# Patient Record
Sex: FEMALE | Race: WHITE | NOT HISPANIC OR LATINO | Employment: OTHER | URBAN - METROPOLITAN AREA
[De-identification: names, ages, dates, MRNs, and addresses within clinical notes are randomized per-mention and may not be internally consistent; named-entity substitution may affect disease eponyms.]

---

## 2017-01-03 ENCOUNTER — APPOINTMENT (OUTPATIENT)
Dept: RADIOLOGY | Facility: HOSPITAL | Age: 82
End: 2017-01-03
Attending: UROLOGY
Payer: MEDICARE

## 2017-01-03 ENCOUNTER — ANESTHESIA (OUTPATIENT)
Dept: PERIOP | Facility: HOSPITAL | Age: 82
End: 2017-01-03
Payer: MEDICARE

## 2017-01-03 ENCOUNTER — ANESTHESIA EVENT (OUTPATIENT)
Dept: PERIOP | Facility: HOSPITAL | Age: 82
End: 2017-01-03
Payer: MEDICARE

## 2017-01-03 ENCOUNTER — HOSPITAL ENCOUNTER (OUTPATIENT)
Facility: HOSPITAL | Age: 82
Setting detail: OUTPATIENT SURGERY
Discharge: HOME | End: 2017-01-03
Attending: UROLOGY | Admitting: UROLOGY
Payer: MEDICARE

## 2017-01-03 VITALS
WEIGHT: 170 LBS | RESPIRATION RATE: 18 BRPM | HEART RATE: 89 BPM | HEIGHT: 60 IN | BODY MASS INDEX: 33.38 KG/M2 | OXYGEN SATURATION: 100 % | DIASTOLIC BLOOD PRESSURE: 67 MMHG | SYSTOLIC BLOOD PRESSURE: 151 MMHG | TEMPERATURE: 97.8 F

## 2017-01-03 PROCEDURE — 74000 HB X-RAY EXAM OF ABDOMEN (SINGLE ANTEROPOSTERIOR VIEW): CPT

## 2017-01-03 RX ORDER — LIDOCAINE HYDROCHLORIDE 10 MG/ML
INJECTION, SOLUTION INFILTRATION; PERINEURAL AS NEEDED
Status: DISCONTINUED | OUTPATIENT
Start: 2017-01-03 | End: 2017-01-03 | Stop reason: SURG

## 2017-01-03 RX ORDER — SODIUM CHLORIDE, SODIUM LACTATE, POTASSIUM CHLORIDE, CALCIUM CHLORIDE 600; 310; 30; 20 MG/100ML; MG/100ML; MG/100ML; MG/100ML
100 INJECTION, SOLUTION INTRAVENOUS CONTINUOUS
Status: DISCONTINUED | OUTPATIENT
Start: 2017-01-03 | End: 2017-01-03

## 2017-01-03 RX ORDER — OXYCODONE HYDROCHLORIDE AND ACETAMINOPHEN 5; 325 MG/1; MG/1
1 TABLET ORAL EVERY 4 HOURS PRN
Qty: 12 TABLET | Refills: 0 | Status: SHIPPED | OUTPATIENT
Start: 2017-01-03 | End: 2017-01-06

## 2017-01-03 RX ORDER — EPHEDRINE SULFATE 50 MG/ML
INJECTION, SOLUTION INTRAVENOUS AS NEEDED
Status: DISCONTINUED | OUTPATIENT
Start: 2017-01-03 | End: 2017-01-03 | Stop reason: SURG

## 2017-01-03 RX ORDER — FENTANYL CITRATE/PF 50 MCG/ML
25 SYRINGE (ML) INJECTION
Status: DISCONTINUED | OUTPATIENT
Start: 2017-01-03 | End: 2017-01-03 | Stop reason: HOSPADM

## 2017-01-03 RX ORDER — SODIUM CHLORIDE, SODIUM LACTATE, POTASSIUM CHLORIDE, CALCIUM CHLORIDE 600; 310; 30; 20 MG/100ML; MG/100ML; MG/100ML; MG/100ML
75 INJECTION, SOLUTION INTRAVENOUS CONTINUOUS
Status: DISCONTINUED | OUTPATIENT
Start: 2017-01-03 | End: 2017-01-03 | Stop reason: HOSPADM

## 2017-01-03 RX ORDER — CEFUROXIME AXETIL 500 MG/1
500 TABLET ORAL EVERY 12 HOURS SCHEDULED
Qty: 6 TABLET | Refills: 0 | Status: SHIPPED | OUTPATIENT
Start: 2017-01-03 | End: 2017-01-06

## 2017-01-03 RX ORDER — OXYCODONE HYDROCHLORIDE AND ACETAMINOPHEN 5; 325 MG/1; MG/1
1 TABLET ORAL EVERY 4 HOURS PRN
Status: DISCONTINUED | OUTPATIENT
Start: 2017-01-03 | End: 2017-01-03 | Stop reason: HOSPADM

## 2017-01-03 RX ORDER — PROPOFOL 10 MG/ML
INJECTION, EMULSION INTRAVENOUS AS NEEDED
Status: DISCONTINUED | OUTPATIENT
Start: 2017-01-03 | End: 2017-01-03 | Stop reason: SURG

## 2017-01-03 RX ORDER — PROPOFOL 10 MG/ML
INJECTION, EMULSION INTRAVENOUS AS NEEDED
Status: DISCONTINUED | OUTPATIENT
Start: 2017-01-03 | End: 2017-01-03

## 2017-01-03 RX ORDER — SODIUM CHLORIDE, SODIUM LACTATE, POTASSIUM CHLORIDE, CALCIUM CHLORIDE 600; 310; 30; 20 MG/100ML; MG/100ML; MG/100ML; MG/100ML
200 INJECTION, SOLUTION INTRAVENOUS CONTINUOUS
Status: DISCONTINUED | OUTPATIENT
Start: 2017-01-03 | End: 2017-01-03 | Stop reason: HOSPADM

## 2017-01-03 RX ORDER — FUROSEMIDE 10 MG/ML
INJECTION INTRAMUSCULAR; INTRAVENOUS AS NEEDED
Status: DISCONTINUED | OUTPATIENT
Start: 2017-01-03 | End: 2017-01-03 | Stop reason: SURG

## 2017-01-03 RX ORDER — ONDANSETRON 2 MG/ML
INJECTION INTRAMUSCULAR; INTRAVENOUS AS NEEDED
Status: DISCONTINUED | OUTPATIENT
Start: 2017-01-03 | End: 2017-01-03 | Stop reason: SURG

## 2017-01-03 RX ORDER — ONDANSETRON 2 MG/ML
4 INJECTION INTRAMUSCULAR; INTRAVENOUS ONCE AS NEEDED
Status: DISCONTINUED | OUTPATIENT
Start: 2017-01-03 | End: 2017-01-03 | Stop reason: HOSPADM

## 2017-01-03 RX ADMIN — PROPOFOL 130 MG: 10 INJECTION, EMULSION INTRAVENOUS at 09:18

## 2017-01-03 RX ADMIN — EPHEDRINE SULFATE 5 MG: 50 INJECTION, SOLUTION INTRAMUSCULAR; INTRAVENOUS; SUBCUTANEOUS at 09:19

## 2017-01-03 RX ADMIN — SODIUM CHLORIDE, SODIUM LACTATE, POTASSIUM CHLORIDE, AND CALCIUM CHLORIDE 100 ML/HR: .6; .31; .03; .02 INJECTION, SOLUTION INTRAVENOUS at 08:10

## 2017-01-03 RX ADMIN — CEFAZOLIN SODIUM 2000 MG: 2 SOLUTION INTRAVENOUS at 09:06

## 2017-01-03 RX ADMIN — ONDANSETRON 4 MG: 2 INJECTION INTRAMUSCULAR; INTRAVENOUS at 09:32

## 2017-01-03 RX ADMIN — DEXAMETHASONE SODIUM PHOSPHATE 5 MG: 10 INJECTION INTRAMUSCULAR; INTRAVENOUS at 09:32

## 2017-01-03 RX ADMIN — FUROSEMIDE 20 MG: 10 INJECTION, SOLUTION INTRAMUSCULAR; INTRAVENOUS at 09:54

## 2017-01-03 RX ADMIN — LIDOCAINE HYDROCHLORIDE 100 MG: 10 INJECTION, SOLUTION INFILTRATION; PERINEURAL at 09:18

## 2017-01-27 ENCOUNTER — HOSPITAL ENCOUNTER (OUTPATIENT)
Dept: RADIOLOGY | Age: 82
Discharge: HOME | End: 2017-01-27
Payer: MEDICARE

## 2017-01-27 ENCOUNTER — TRANSCRIBE ORDERS (OUTPATIENT)
Dept: ADMINISTRATIVE | Age: 82
End: 2017-01-27

## 2017-01-27 DIAGNOSIS — N20.0 URIC ACID NEPHROLITHIASIS: ICD-10-CM

## 2017-01-27 DIAGNOSIS — N20.0 URIC ACID NEPHROLITHIASIS: Primary | ICD-10-CM

## 2017-01-27 PROCEDURE — 74000 HB X-RAY EXAM OF ABDOMEN (SINGLE ANTEROPOSTERIOR VIEW): CPT

## 2017-02-28 ENCOUNTER — GENERIC CONVERSION - ENCOUNTER (OUTPATIENT)
Dept: OTHER | Facility: OTHER | Age: 82
End: 2017-02-28

## 2017-06-06 ENCOUNTER — ALLSCRIPTS OFFICE VISIT (OUTPATIENT)
Dept: OTHER | Facility: OTHER | Age: 82
End: 2017-06-06

## 2017-07-12 ENCOUNTER — TRANSCRIBE ORDERS (OUTPATIENT)
Dept: ADMINISTRATIVE | Age: 82
End: 2017-07-12

## 2017-07-12 ENCOUNTER — APPOINTMENT (OUTPATIENT)
Dept: RADIOLOGY | Age: 82
End: 2017-07-12
Payer: MEDICARE

## 2017-07-12 DIAGNOSIS — N20.0 URIC ACID NEPHROLITHIASIS: Primary | ICD-10-CM

## 2017-07-12 DIAGNOSIS — N20.0 URIC ACID NEPHROLITHIASIS: ICD-10-CM

## 2017-07-12 PROCEDURE — 74000 HB X-RAY EXAM OF ABDOMEN (SINGLE ANTEROPOSTERIOR VIEW): CPT

## 2017-09-25 ENCOUNTER — GENERIC CONVERSION - ENCOUNTER (OUTPATIENT)
Dept: OTHER | Facility: OTHER | Age: 82
End: 2017-09-25

## 2017-12-06 ENCOUNTER — ALLSCRIPTS OFFICE VISIT (OUTPATIENT)
Dept: OTHER | Facility: OTHER | Age: 82
End: 2017-12-06

## 2017-12-06 ENCOUNTER — APPOINTMENT (OUTPATIENT)
Dept: LAB | Facility: CLINIC | Age: 82
End: 2017-12-06
Payer: MEDICARE

## 2017-12-06 ENCOUNTER — GENERIC CONVERSION - ENCOUNTER (OUTPATIENT)
Dept: OTHER | Facility: OTHER | Age: 82
End: 2017-12-06

## 2017-12-06 DIAGNOSIS — I10 ESSENTIAL (PRIMARY) HYPERTENSION: ICD-10-CM

## 2017-12-06 DIAGNOSIS — N20.0 CALCULUS OF KIDNEY: ICD-10-CM

## 2017-12-06 LAB
ALBUMIN SERPL BCP-MCNC: 3.4 G/DL (ref 3.5–5)
ALP SERPL-CCNC: 97 U/L (ref 46–116)
ALT SERPL W P-5'-P-CCNC: 18 U/L (ref 12–78)
ANION GAP SERPL CALCULATED.3IONS-SCNC: 6 MMOL/L (ref 4–13)
AST SERPL W P-5'-P-CCNC: 16 U/L (ref 5–45)
BASOPHILS # BLD AUTO: 0.02 THOUSANDS/ΜL (ref 0–0.1)
BASOPHILS NFR BLD AUTO: 0 % (ref 0–1)
BILIRUB SERPL-MCNC: 0.83 MG/DL (ref 0.2–1)
BUN SERPL-MCNC: 20 MG/DL (ref 5–25)
CALCIUM SERPL-MCNC: 9.5 MG/DL (ref 8.3–10.1)
CHLORIDE SERPL-SCNC: 106 MMOL/L (ref 100–108)
CHOLEST SERPL-MCNC: 158 MG/DL (ref 50–200)
CO2 SERPL-SCNC: 29 MMOL/L (ref 21–32)
CREAT SERPL-MCNC: 1.02 MG/DL (ref 0.6–1.3)
EOSINOPHIL # BLD AUTO: 0.14 THOUSAND/ΜL (ref 0–0.61)
EOSINOPHIL NFR BLD AUTO: 3 % (ref 0–6)
ERYTHROCYTE [DISTWIDTH] IN BLOOD BY AUTOMATED COUNT: 14.1 % (ref 11.6–15.1)
GFR SERPL CREATININE-BSD FRML MDRD: 50 ML/MIN/1.73SQ M
GLUCOSE P FAST SERPL-MCNC: 88 MG/DL (ref 65–99)
HCT VFR BLD AUTO: 44.9 % (ref 34.8–46.1)
HDLC SERPL-MCNC: 62 MG/DL (ref 40–60)
HGB BLD-MCNC: 14.9 G/DL (ref 11.5–15.4)
LDLC SERPL CALC-MCNC: 81 MG/DL (ref 0–100)
LYMPHOCYTES # BLD AUTO: 1.24 THOUSANDS/ΜL (ref 0.6–4.47)
LYMPHOCYTES NFR BLD AUTO: 26 % (ref 14–44)
MCH RBC QN AUTO: 31.2 PG (ref 26.8–34.3)
MCHC RBC AUTO-ENTMCNC: 33.2 G/DL (ref 31.4–37.4)
MCV RBC AUTO: 94 FL (ref 82–98)
MONOCYTES # BLD AUTO: 0.41 THOUSAND/ΜL (ref 0.17–1.22)
MONOCYTES NFR BLD AUTO: 9 % (ref 4–12)
NEUTROPHILS # BLD AUTO: 2.89 THOUSANDS/ΜL (ref 1.85–7.62)
NEUTS SEG NFR BLD AUTO: 62 % (ref 43–75)
NRBC BLD AUTO-RTO: 0 /100 WBCS
PLATELET # BLD AUTO: 180 THOUSANDS/UL (ref 149–390)
PMV BLD AUTO: 10.3 FL (ref 8.9–12.7)
POTASSIUM SERPL-SCNC: 4.8 MMOL/L (ref 3.5–5.3)
PROT SERPL-MCNC: 6.7 G/DL (ref 6.4–8.2)
RBC # BLD AUTO: 4.78 MILLION/UL (ref 3.81–5.12)
SODIUM SERPL-SCNC: 141 MMOL/L (ref 136–145)
TRIGL SERPL-MCNC: 76 MG/DL
TSH SERPL DL<=0.05 MIU/L-ACNC: 1.73 UIU/ML (ref 0.36–3.74)
WBC # BLD AUTO: 4.71 THOUSAND/UL (ref 4.31–10.16)

## 2017-12-06 PROCEDURE — 80061 LIPID PANEL: CPT

## 2017-12-06 PROCEDURE — 36415 COLL VENOUS BLD VENIPUNCTURE: CPT

## 2017-12-06 PROCEDURE — 85025 COMPLETE CBC W/AUTO DIFF WBC: CPT

## 2017-12-06 PROCEDURE — 84443 ASSAY THYROID STIM HORMONE: CPT

## 2017-12-06 PROCEDURE — 80053 COMPREHEN METABOLIC PANEL: CPT

## 2017-12-07 NOTE — PROGRESS NOTES
Assessment    1  Hyperlipidemia (272 4) (E78 5)  2  Hypertension (401 9) (I10)    Plan  Hypertension, Nephrolithiasis    · (1) LIPID PANEL, FASTING; Status:Active; Requested for:46Tfh3001;    · (1) TSH; Status:Active; Requested for:03Gts8057;   Nephrolithiasis    · (1) CBC/PLT/DIFF; Status:Active; Requested for:65Sxw4908;    · (1) COMPREHENSIVE METABOLIC PANEL; Status:Active; Requested for:82Bvn6240;     Discussion/Summary    Hypertension  Patient blood pressure is stable at this time she will continue current regimen of medications  She will obtain blood work as ordered  Patient will have lipid panel blood work and continue with current dose of statin therapy  maintenance  Patient will have annual flu vaccine today     Chief Complaint  Patient is here for a followup  Patient would like a script for blood work  Patient would like to receive the influenza vaccination  Patient c/o a tender spot on the back of her head x's 1yr which is causing her some concern  All medications were reviewed and updated with the patient  History of Present Illness  I reviewed chief complaint with the patient and her son  They deny any recent illness  Review of Systems   Constitutional: No fever, no chills, feels well, no tiredness, no recent weight gain or weight loss  Eyes: No complaints of eye pain, no red eyes, no eyesight problems, no discharge, no dry eyes, no itching of eyes  ENT: no complaints of earache, no loss of hearing, no nose bleeds, no nasal discharge, no sore throat, no hoarseness  Cardiovascular: No complaints of slow heart rate, no fast heart rate, no chest pain, no palpitations, no leg claudication, no lower extremity edema  Respiratory: No complaints of shortness of breath, no wheezing, no cough, no SOB on exertion, no orthopnea, no PND  Gastrointestinal: No complaints of abdominal pain, no constipation, no nausea or vomiting, no diarrhea, no bloody stools    Genitourinary: No complaints of dysuria, no incontinence, no pelvic pain, no dysmenorrhea, no vaginal discharge or bleeding  Musculoskeletal: The patient complains of occasional neck and right arm discomfort due to arthritis  Integumentary: as noted in HPI  Active Problems  1  Abdominal pain (789 00) (R10 9)  2  Aftercare for healing traumatic fracture (V54 19)  3  Altered mental status, unspecified (780 97) (R41 82)  4  Amaurosis fugax (362 34) (G45 3)  5  Backache (724 5) (M54 9)  6  Bilateral shoulder pain (719 41) (M25 511,M25 512)  7  Fracture of proximal humerus (812 00) (S42 209A)  8  Hearing Loss (389 9)  9  Hyperlipidemia (272 4) (E78 5)  10  Hypertension (401 9) (I10)  11  Need for prophylactic vaccination and inoculation against influenza (V04 81) (Z23)  12  Need for vaccination with 13-polyvalent pneumococcal conjugate vaccine (V03 82) (Z23)  13  Nephrolithiasis (592 0) (N20 0)  14  Subarachnoid hemorrhage (430) (I60 9)  15  Tinea pedis (110 4) (B35 3)  16  Vitamin D deficiency (268 9) (E55 9)    Past Medical History  1  History of Nephrolithiasis (V13 01)    Family History  Brother   1  Family history of malignant neoplasm of prostate (C02 39) (Z80 45)    Social History     · Never A Smoker  The social history was reviewed and updated today  Current Meds  1  Calcium-Vitamin D 500-200 MG-UNIT Oral Tablet; Take 1 tablet daily as directed; Therapy: (Recorded:27Jun2016) to Recorded  2  Clotrimazole-Betamethasone 1-0 05 % External Cream; APPLY  AND RUB  IN A THIN FILM TO AFFECTED AREAS TWICE DAILY  (AM AND PM); Therapy: 14WDH5723 to (Last ZC:78GCA7588)  Requested for: 67JET1696 Ordered  3  Furosemide 20 MG Oral Tablet; take one tablet by mouth every day; Therapy: 19Mqk1132 to (Evaluate:33Jie2756)  Requested for: 35Lzf8049; Last Rx:32Nzm4755 Ordered  4  Multi For Her 50+ Oral Capsule; take one tablet by mouth daily; Therapy: (Recorded:06Jun2017) to Recorded  5   Potassium Chloride ER 10 MEQ Oral Tablet Extended Release; take one tablet by mouth every day; Therapy: 34ALM5827 to (Evaluate:11Cib9759)  Requested for: 26Bto5045; Last Rx:77Bek2597 Ordered  6  Simvastatin 40 MG Oral Tablet; take one tablet by mouth every day; Therapy: 67MMI9787 to ([phone])  Requested for: 54Hmx3082; Last Rx:82Jch0508 Ordered    The medication list was reviewed and updated today  Allergies  1  No Known Drug Allergies    Vitals  Vital Signs    Recorded: 69Ajv6334 07:44AM Recorded: 76PVP5868 07:34AM   Temperature  95 2 F   Heart Rate  68   Respiration  16   Systolic 681 505   Diastolic 80 86   Height  4 ft 11 5 in   Weight  167 lb 4 oz   BMI Calculated  33 22   BSA Calculated  1 72       Physical Exam   Constitutional  General appearance: No acute distress, well appearing and well nourished  Ears, Nose, Mouth, and Throat  External inspection of ears and nose: Normal    Otoscopic examination: Tympanic membranes translucent with normal light reflex  Canals patent without erythema  Oropharynx: Normal with no erythema, edema, exudate or lesions  Pulmonary  Respiratory effort: No increased work of breathing or signs of respiratory distress  Auscultation of lungs: Clear to auscultation  Cardiovascular  Auscultation of heart: Normal rate and rhythm, normal S1 and S2, without murmurs  Examination of extremities for edema and/or varicosities: Abnormal  -- Trace edema bilateral lower extremity  Carotid pulses: Normal    Lymphatic  Palpation of lymph nodes in neck: No lymphadenopathy  Musculoskeletal  Gait and station: Abnormal  -- Patient ambulates with a quad cane  Skin  Skin and subcutaneous tissue: Abnormal  -- Patient has some benign now joules corresponding to her skull structure  No redness or ulcerations noted  Nodules are not tender to palpation  Health Management  Health Maintenance   Medicare Annual Wellness Visit; every 1 year; Next Due: 76MZW5080;  Active    Signatures   Electronically signed by : PEMA Olmos ; Dec  6 5937  7:55AM EST                       (Author)    Electronically signed by : PEMA Smallwood ; Dec  6 0428  5:04PM EST                       (Author)

## 2018-01-12 NOTE — RESULT NOTES
Verified Results  (1) CBC/PLT/DIFF 30EFH2148 86:46ST Staci Ruiz    Order Number: [id number]  TW Order Number: [id number]     Test Name Result Flag Reference   WBC COUNT 5 50 Thousand/uL  4 31-10 16   RBC COUNT 4 69 Million/uL  3 81-5 12   HEMOGLOBIN 14 4 g/dL  11 5-15 4   HEMATOCRIT 44 3 %  34 8-46  1   MCV 95 fL  82-98   MCH 30 7 pg  26 8-34 3   MCHC 32 5 g/dL  31 4-37 4   RDW 14 8 %  11 6-15 1   MPV 11 1 fL  8 9-12 7   PLATELET COUNT 846 Thousands/uL  149-390   nRBC AUTOMATED 0 /100 WBCs     NEUTROPHILS RELATIVE PERCENT 61 %  43-75   LYMPHOCYTES RELATIVE PERCENT 26 %  14-44   MONOCYTES RELATIVE PERCENT 8 %  4-12   EOSINOPHILS RELATIVE PERCENT 4 %  0-6   BASOPHILS RELATIVE PERCENT 1 %  0-1   NEUTROPHILS ABSOLUTE COUNT 3 38 Thousands/?L  1 85-7 62   LYMPHOCYTES ABSOLUTE COUNT 1 41 Thousands/?L  0 60-4 47   MONOCYTES ABSOLUTE COUNT 0 43 Thousand/?L  0 17-1 22   EOSINOPHILS ABSOLUTE COUNT 0 24 Thousand/?L  0 00-0 61   BASOPHILS ABSOLUTE COUNT 0 03 Thousands/?L  0 00-0 10     (1) COMPREHENSIVE METABOLIC PANEL 54NAP0776 35:05OC Staci Ruiz    Order Number: [id number]  TW Order Number: [id number] Order Number: [id number] Order Number: [id number]     Test Name Result Flag Reference   GLUCOSE,RANDM 99 mg/dL     If the patient is fasting, the ADA then defines impaired fasting glucose as > 100 mg/dL and diabetes as > or equal to 123 mg/dL     SODIUM 143 mmol/L  136-145   POTASSIUM 4 4 mmol/L  3 5-5 3   CHLORIDE 106 mmol/L  100-108   CARBON DIOXIDE 31 mmol/L  21-32   ANION GAP (CALC) 6 mmol/L  4-13   BLOOD UREA NITROGEN 23 mg/dL  5-25   CREATININE 1 11 mg/dL  0 60-1 30   Standardized to IDMS reference method   CALCIUM 8 9 mg/dL  8 3-10 1   BILI, TOTAL 0 73 mg/dL  0 20-1 00   ALK PHOSPHATAS 86 U/L     ALT (SGPT) 26 U/L  12-78   AST(SGOT) 21 U/L  5-45   ALBUMIN 3 5 g/dL  3 5-5 0   TOTAL PROTEIN 6 4 g/dL  6 4-8 2   eGFR Non- 46 7 ml/min/1 73sq m     [facility] Disease Education Program recommendations are as follows:  GFR calculation is accurate only with a steady state creatinine  Chronic Kidney disease less than 60 ml/min/1 73 sq  meters  Kidney failure less than 15 ml/min/1 73 sq  meters  (1) VITAMIN D 25-HYDROXY 30XCP9363 01:17SZ Yanet Issa Order Number: [id number]  TW Order Number: [id number]     Test Name Result Flag Reference   VIT D 25-HYDROX 24 4 ng/mL L 30 0-100 0     (1) LIPID PANEL, FASTING 27WZQ8448 57:61CP Moniquexin Issa Order Number: [id number]  TW Order Number: [id number] Order Number: [id number] Order Number: [id number]     Test Name Result Flag Reference   CHOLESTEROL 152 mg/dL     HDL,DIRECT 57 mg/dL  40-60   Specimen collection should occur prior to Metamizole administration due to the potential for falsely depressed results  LDL CHOLESTEROL CALCULATED 77 mg/dL  0-100   Triglyceride:         Normal              <150 mg/dl       Borderline High    150-199 mg/dl       High               200-499 mg/dl       Very High          >499 mg/dl  Cholesterol:         Desirable        <200 mg/dl      Borderline High  200-239 mg/dl      High             >239 mg/dl  HDL Cholesterol:        High    >59 mg/dL      Low     <41 mg/dL  LDL CALCULATED:    This screening LDL is a calculated result  It does not have the accuracy of the Direct Measured LDL in the monitoring of patients with hyperlipidemia and/or statin therapy  Direct Measure LDL (JUH065) must be ordered separately in these patients  TRIGLYCERIDES 89 mg/dL  <=150   Specimen collection should occur prior to N-Acetylcysteine or Metamizole administration due to the potential for falsely depressed results       (1) TSH 78DXJ9229 58:28HD Yanet Issa Order Number: [id number]  TW Order Number: [id number] Order Number: [id number] Order Number: [id number]     Test Name Result Flag Reference   TSH 1 130 uIU/mL  0 358-3 740   The recommended reference ranges for TSH during pregnancy are as follows:  First trimester 0 1 to 2 5 uIU/mL  Second trimester  0 2 to 3 0 uIU/mL  Third trimester 0 3 to 3 0 uIU/m       Discussion/Summary   bw shows vit D still low   Increase otc vit D by 2000 units tab daily

## 2018-01-14 VITALS
DIASTOLIC BLOOD PRESSURE: 70 MMHG | HEART RATE: 80 BPM | WEIGHT: 166 LBS | RESPIRATION RATE: 16 BRPM | SYSTOLIC BLOOD PRESSURE: 120 MMHG | TEMPERATURE: 97.4 F | BODY MASS INDEX: 32.59 KG/M2 | HEIGHT: 60 IN

## 2018-01-15 NOTE — MISCELLANEOUS
Assessment    1  Tinea pedis (110 4) (B35 3)   2  Altered mental status, unspecified (780 97) (R41 82)    Plan  Amaurosis fugax, Mental status change    · * CT HEAD WO CONTRAST; Status:Hold For - Scheduling; Requested LYNDON:54OVS5488;    Perform:[facility]; ZLZ:81IWG9040;UYJRFKG; For:Amaurosis fugax, Mental status change; Ordered By:Saad Dill;  Tinea pedis    · Clotrimazole-Betamethasone 1-0 05 % External Cream; APPLY  AND RUB  IN A  THIN FILM TO AFFECTED AREAS TWICE DAILY  (AM AND PM)   Rx By: Roxane Malhotra; Dispense: 0 Days ; #:1 X 15 GM Tube; Refill: 1; For: Tinea pedis; SHASHANK = N; Verified Transmission to [employer]; Last Updated By: [employer]; 6/27/2016 4:35:43 PM    Discussion/Summary  Discussion Summary:   Change in mental status  Patient appears to be back to her baseline  She will follow up with neurologist for consultation  She was given a prescription to recheck CAT scan without contrast of her head  Tenia pedis  Patient given a prescription to try Lotrisone cream twice daily to the affected area per feet  Patient will call symptoms persist within 2 weeks  History of hypertension  Patient's blood pressure stable at this time she will continue to hold her beta blocker  Her son will check blood pressures on home device and call if it maintains above 150/90  He will continue with other regimen of medications  Chief Complaint  Chief Complaint Free Text Note Form: JACOB: PT was admitted to [facility] from 06/17/2016 through 06/23/2016  Dx: Non traumatic Intracial Hemorrhage, nephrolithiasis, essential HTN  Spoke with pt's son Armin Adam), who reports pt is doing ok  No complaints of abdominal pain, N/V  Pt is getting around well with her her roller and tolerating her food intake well   Follow up with pcp scheduled for 06/27/2016 @ 3:30pm       History of Present Illness  TCM Communication [facility]: The patient is being contacted for follow-up after hospitalization and 06/24/2016  She was hospitalized at [facility]  The date of admission: 06/17/2016, date of discharge: 06/23/2016  Diagnosis: see note  She was discharged to home  The patient is currently asymptomatic  Counseling was provided to patient's family  SonCon  Topics counseled included importance of compliance with treatment  Communication performed and completed by Art Jenkins       HPI: I reviewed JACOB note with the patient and her son  I also reviewed discharge summary from hospital in various testing that was performed in the hospital  It appears no etiology was discovered for the patient's symptoms where she became acutely disoriented having difficulties with memory and confusion  Currently patient is asymptomatic and denies any similar symptoms at this time  Review of Systems  Complete-Female:   Constitutional: No fever, no chills, feels well, no tiredness, no recent weight gain or weight loss  Eyes: No complaints of eye pain, no red eyes, no eyesight problems, no discharge, no dry eyes, no itching of eyes  ENT: no complaints of earache, no loss of hearing, no nose bleeds, no nasal discharge, no sore throat, no hoarseness  Cardiovascular: No complaints of slow heart rate, no fast heart rate, no chest pain, no palpitations, no leg claudication, no lower extremity edema  Respiratory: No complaints of shortness of breath, no wheezing, no cough, no SOB on exertion, no orthopnea, no PND  Gastrointestinal: No complaints of abdominal pain, no constipation, no nausea or vomiting, no diarrhea, no bloody stools  Genitourinary: No complaints of dysuria, no incontinence, no pelvic pain, no dysmenorrhea, no vaginal discharge or bleeding  Musculoskeletal: No complaints of arthralgias, no myalgias, no joint swelling or stiffness, no limb pain or swelling  Integumentary: Patient reports dryness and cracking of bilateral feet     Neurological: No complaints of headache, no confusion, no convulsions, no numbness, no dizziness or fainting, no tingling, no limb weakness, no difficulty walking  Psychiatric: Not suicidal, no sleep disturbance, no anxiety or depression, no change in personality, no emotional problems  Active Problems    1  Aftercare for healing traumatic fracture (V54 19)   2  Altered mental status, unspecified (780 97) (R41 82)   3  Amaurosis fugax (362 34) (G45 3)   4  Back Pain   5  Bilateral shoulder pain (719 41) (M25 511,M25 512)   6  Fracture of proximal humerus (812 00) (S42 209A)   7  Hearing Loss (389 9)   8  Hyperlipidemia (272 4) (E78 5)   9  Hypertension (401 9) (I10)   10  Need for prophylactic vaccination and inoculation against influenza (V04 81) (Z23)   11  Vitamin D deficiency (268 9) (E55 9)    Past Medical History    1  History of Nephrolithiasis (V13 01)    Social History    · Never A Smoker    Current Meds   1  Atenolol 50 MG Oral Tablet; TAKE ONE (1) TABLET(S) DAILY; Therapy: 79UVD7394 to (Evaluate:05Jun2017)  Requested for: 86BKN9301; Last   Rx:10Jun2016 Ordered   2  Calcium-Vitamin D 500-200 MG-UNIT Oral Tablet; Take 1 tablet daily as directed; Therapy: (Recorded:27Jun2016) to Recorded   3  Furosemide 20 MG Oral Tablet; TAKE ONE (1) TABLET(S) DAILY; Therapy: 70Tfy5059 to (01 72 64 30 83)  Requested for: 56Ser2737; Last   Rx:58Iwc9769 Ordered   4  Multi Vitamin Mens Oral Tablet; Therapy: (06-70679835) to Recorded   5  Potassium Chloride ER 10 MEQ Oral Tablet Extended Release; TAKE ONE (1)   TABLET(S) DAILY; Therapy: 21KFP0846 to (Evaluate:15Jxl7974)  Requested for: 26Slp5747; Last   Rx:51Swx5424 Ordered   6  Simvastatin 40 MG Oral Tablet; TAKE ONE (1) TABLET(S) DAILY; Therapy: 73NKC9384 to (Evaluate:30Tzu7775)  Requested for: 91Kvj3951; Last   Rx:02Iww0798 Ordered  Medication List Reviewed: The medication list was reviewed and updated today  Allergies    1   No Known Drug Allergies    Vitals  Signs [Data Includes: Current Encounter]   Recorded: 70RAV2448 00:67IW   Systolic: 326  Diastolic: 80  Recorded: 29RLX4867 03:51PM   Temperature: 97 F  Heart Rate: 84  Respiration: 16  Systolic: 311  Diastolic: 82  Height: 4 ft 11 5 in  Weight: 178 lb 8 0 oz  BMI Calculated: 35 45  BSA Calculated: 1 77    Physical Exam    Constitutional   General appearance: No acute distress, well appearing and well nourished  Ears, Nose, Mouth, and Throat   External inspection of ears and nose: Normal     Otoscopic examination: Tympanic membranes translucent with normal light reflex  Canals patent without erythema  Oropharynx: Normal with no erythema, edema, exudate or lesions  Pulmonary   Respiratory effort: No increased work of breathing or signs of respiratory distress  Auscultation of lungs: Clear to auscultation  Cardiovascular   Auscultation of heart: Normal rate and rhythm, normal S1 and S2, without murmurs  Carotid pulses: Normal     Lymphatic   Palpation of lymph nodes in neck: No lymphadenopathy  Musculoskeletal   Gait and station: Normal     Skin   Skin and subcutaneous tissue: Abnormal   Bilateral feet with dry cracked reddish skin scattered bilaterally  Neurologic   Cranial nerves: Cranial nerves 2-12 intact  Health Management  Health Maintenance   Medicare Annual Wellness Visit; every 1 year; Next Due: 34GWE7630; Active    Future Appointments    Date/Time Provider Specialty Site   27/48/9296 02:84 AM PEMA Awad   Family Medicine [facility]   07/07/2016 09:00 AM Esteban Martinez N Vicky Sexton     Signatures   Electronically signed by : PEMA Daugherty ; Jun 27 4656  7:34PM EST                       (Author)

## 2018-01-16 NOTE — RESULT NOTES
Verified Results  * CT HEAD WO CONTRAST 87SIC4772 30:21SI Argenis Cuello Order Number: [id number]     Test Name Result Flag Reference   CT HEAD WO CONTRAST (Report)     CT BRAIN - WITHOUT CONTRAST     INDICATION: Visual problems/dizziness     COMPARISON: June 20, 2016     TECHNIQUE: CT examination of the brain was performed  In addition to axial images, coronal reformatted images were created and submitted for interpretation  Examination was performed utilizing techniques to minimize radiation dose, including the use    of dose reduction software  IMAGE QUALITY: Diagnostic  FINDINGS:      PARENCHYMA: No mass effect or midline shift  No acute intracranial hemorrhage  No CT signs of acute infarction  A left falcine parasagittal calcified mass seen measuring about 1 6 cm suggest meningioma, stable since the previous study   Mild periventricular white matter hypodensity seen likely due to chronic small vessel ischemic changes hypodensity seen in the left subinsular region, stable  VENTRICLES AND EXTRA-AXIAL SPACES: Mild cerebral atrophy seen   There are prominent cerebellar folia suggest cerebellar atrophy, unchanged from the previous study     VISUALIZED ORBITS AND PARANASAL SINUSES: Unremarkable  CALVARIUM AND EXTRACRANIAL SOFT TISSUES:  Normal        IMPRESSION:     No acute intracranial abnormality  STABLE CT BRAIN SINCE THE PREVIOUS STUDY OF JUNE 20, 2016       Workstation performed: [id number]     Signed by:   Zenaida Schaeffer MD   7/21/16       Discussion/Summary   please call son Chapni Dyson   CT scan of head is stable since last CT scan 6/16

## 2018-01-16 NOTE — MISCELLANEOUS
Assessment    1  Nephrolithiasis (592 0) (N20 0)    Plan  Need for prophylactic vaccination and inoculation against influenza    · Fluzone High-Dose 0 5 ML Intramuscular Suspension Prefilled Syringe   For: Need for prophylactic vaccination and inoculation against influenza; Ordered By:Saad Dill; Effective Date:22Nov2016; Administered by: Jen Hernandez: 11/22/2016 7:09:00 PM    Discussion/Summary  Discussion Summary:   Nephrolithiasis  Patient appears to be doing well after receiving treatment in hospital stay  She will follow up with her urologist as ordered  Chief Complaint  Chief Complaint Free Text Note Form: JACOB NOTE: Admitted to [facility] 11/7/16 and D/C 11/11/16 with Dx: Nephrolithiasis/moderate left hydronephrosis due to left ureteral calculus  Denies c/o pain at present  Spoke with pt and   History of Present Illness  TCM Communication [facility]: The patient is being contacted for 11/11/16  Hospital records were reviewed  She was hospitalized [facility]  The date of admission: 11/7/16, date of discharge: 11/11/16  Diagnosis: Nephrolithiasis/moderate left hydronephrosis due to left ureteral calculus  She was discharged to home  Medications were not reviewed today  She scheduled a follow up appointment  Follow-up appointments with other specialists: Pt to schedule appt with Dr Darryle Ishihara  The patient is currently asymptomatic  Counseling was provided to the patient and patient's family    Topics counseled included instructions for management and importance of compliance with treatment  Communication performed and completed by Annalee Martinez LPN       HPI: I reviewed the patient's JACOB note  Patient apparently had bilateral ureteral stents placed as well as lithotripsy of right sided kidney stone  Patient sees her urologist Ant Avila in the next few weeks after he returns from vacation  She will require further lithotripsy treatments   She is passing large amount of urine without any discomfort  Review of Systems  Complete-Female:   Constitutional: No fever, no chills, feels well, no tiredness, no recent weight gain or weight loss  ENT: no complaints of earache, no loss of hearing, no nose bleeds, no nasal discharge, no sore throat, no hoarseness  Cardiovascular: No complaints of slow heart rate, no fast heart rate, no chest pain, no palpitations, no leg claudication, no lower extremity edema  Respiratory: No complaints of shortness of breath, no wheezing, no cough, no SOB on exertion, no orthopnea, no PND  Gastrointestinal: No complaints of abdominal pain, no constipation, no nausea or vomiting, no diarrhea, no bloody stools  Genitourinary: as noted in HPI  Musculoskeletal: No complaints of arthralgias, no myalgias, no joint swelling or stiffness, no limb pain or swelling  Integumentary: No complaints of skin rash or lesions, no itching, no skin wounds, no breast pain or lump  Active Problems    1  Abdominal pain (789 00) (R10 9)   2  Aftercare for healing traumatic fracture (V54 19)   3  Altered mental status, unspecified (780 97) (R41 82)   4  Amaurosis fugax (362 34) (G45 3)   5  Back Pain   6  Bilateral shoulder pain (719 41) (M25 511,M25 512)   7  Fracture of proximal humerus (812 00) (S42 209A)   8  Hearing Loss (389 9)   9  Hyperlipidemia (272 4) (E78 5)   10  Hypertension (401 9) (I10)   11  Need for prophylactic vaccination and inoculation against influenza (V04 81) (Z23)   12  Subarachnoid hemorrhage (430) (I60 9)   13  Tinea pedis (110 4) (B35 3)   14  Vitamin D deficiency (268 9) (E55 9)    Past Medical History    1  History of Nephrolithiasis (V13 01)    Family History  Brother    1  Family history of malignant neoplasm of prostate (Y70 71) (Z80 45)    Social History    · Never A Smoker  Social History Reviewed: The social history was reviewed and updated today  Current Meds   1  Calcium-Vitamin D 500-200 MG-UNIT Oral Tablet;  Take 1 tablet daily as directed; Therapy: (Recorded:00Tyl3457) to Recorded   2  Clotrimazole-Betamethasone 1-0 05 % External Cream; APPLY  AND RUB  IN A THIN   FILM TO AFFECTED AREAS TWICE DAILY  (AM AND PM); Therapy: 34EMA5503 to (Last LQ:04DEY6304)  Requested for: 29CHJ0663 Ordered   3  Furosemide 20 MG Oral Tablet; TAKE ONE (1) TABLET(S) DAILY; Therapy: 83Gnn8875 to ([phone])  Requested for: 27Apr2016; Last   Rx:[id number] Ordered   4  Multi Vitamin Mens Oral Tablet; Therapy: (06-98193660) to Recorded   5  Potassium Chloride ER 10 MEQ Oral Tablet Extended Release; TAKE ONE (1)   TABLET(S) DAILY; Therapy: 23WTS8088 to (Evaluate:24Oct2016)  Requested for: 27Apr2016; Last   Rx:[id number] Ordered   6  Simvastatin 40 MG Oral Tablet; TAKE ONE (1) TABLET(S) DAILY; Therapy: 23DLW9277 to (Evaluate:43Xpm6409)  Requested for: 86Eji7293; Last   Rx:[id number] Ordered  Medication List Reviewed: The medication list was reviewed and updated today  Allergies    1  No Known Drug Allergies    Vitals  Signs   Recorded: 22Nov2016 06:22PM   Temperature: 97 2 F  Heart Rate: 80  Respiration: 14  Systolic: 162  Diastolic: 72  Height: 4 ft 11 5 in  Weight: 174 lb   BMI Calculated: 34 56  BSA Calculated: 1 75    Physical Exam    Constitutional   General appearance: No acute distress, well appearing and well nourished  Pulmonary   Respiratory effort: No increased work of breathing or signs of respiratory distress  Auscultation of lungs: Clear to auscultation  Cardiovascular   Auscultation of heart: Normal rate and rhythm, normal S1 and S2, without murmurs  Examination of extremities for edema and/or varicosities: Normal     Abdomen   Abdomen: Non-tender, no masses  Liver and spleen: No hepatomegaly or splenomegaly  Musculoskeletal   Gait and station: Abnormal   Patient uses a quad cane for ambulation  Health Management  Health Maintenance   Medicare Annual Wellness Visit; every 1 year;  Next Due: 08WNZ2633; Overdue    Future Appointments    Date/Time Provider Specialty Site   53/72/3423 73:64 PM [facility] [place] [facility]   11/29/2016 08:00 AM Juanita Gallagher [facility]     Signatures   Electronically signed by : PEMA Rosenbaum ; Nov 23 4157  7:03AM EST                       (Author)

## 2018-01-23 VITALS
DIASTOLIC BLOOD PRESSURE: 80 MMHG | BODY MASS INDEX: 32.83 KG/M2 | HEART RATE: 68 BPM | WEIGHT: 167.25 LBS | HEIGHT: 60 IN | SYSTOLIC BLOOD PRESSURE: 138 MMHG | TEMPERATURE: 95.2 F | RESPIRATION RATE: 16 BRPM

## 2018-01-23 NOTE — RESULT NOTES
Discussion/Summary   all bw stable for pt  Verified Results  (1) CBC/PLT/DIFF [id number] 81:35BI Simeon Gutierrez Order Number: [id number]     Test Name Result Flag Reference   WBC COUNT 4 71 Thousand/uL  4 31-10 16   RBC COUNT 4 78 Million/uL  3 81-5 12   HEMOGLOBIN 14 9 g/dL  11 5-15 4   HEMATOCRIT 44 9 %  34 8-46  1   MCV 94 fL  82-98   MCH 31 2 pg  26 8-34 3   MCHC 33 2 g/dL  31 4-37 4   RDW 14 1 %  11 6-15 1   MPV 10 3 fL  8 9-12 7   PLATELET COUNT 495 Thousands/uL  149-390   nRBC AUTOMATED 0 /100 WBCs     NEUTROPHILS RELATIVE PERCENT 62 %  43-75   LYMPHOCYTES RELATIVE PERCENT 26 %  14-44   MONOCYTES RELATIVE PERCENT 9 %  4-12   EOSINOPHILS RELATIVE PERCENT 3 %  0-6   BASOPHILS RELATIVE PERCENT 0 %  0-1   NEUTROPHILS ABSOLUTE COUNT 2 89 Thousands/? ??L  1 85-7 62   LYMPHOCYTES ABSOLUTE COUNT 1 24 Thousands/? ??L  0 60-4 47   MONOCYTES ABSOLUTE COUNT 0 41 Thousand/? ??L  0 17-1 22   EOSINOPHILS ABSOLUTE COUNT 0 14 Thousand/? ??L  0 00-0 61   BASOPHILS ABSOLUTE COUNT 0 02 Thousands/? ??L  0 00-0 10     (1) COMPREHENSIVE METABOLIC PANEL [id number] 44:79VD Simeon Gutierrez Order Number: [id number]     Test Name Result Flag Reference   SODIUM 141 mmol/L  136-145   POTASSIUM 4 8 mmol/L  3 5-5 3   CHLORIDE 106 mmol/L  100-108   CARBON DIOXIDE 29 mmol/L  21-32   ANION GAP (CALC) 6 mmol/L  4-13   BLOOD UREA NITROGEN 20 mg/dL  5-25   CREATININE 1 02 mg/dL  0 60-1 30   Standardized to IDMS reference method   CALCIUM 9 5 mg/dL  8 3-10 1   BILI, TOTAL 0 83 mg/dL  0 20-1 00   ALK PHOSPHATAS 97 U/L     ALT (SGPT) 18 U/L  12-78   Specimen collection should occur prior to Sulfasalazine and/or Sulfapyridine administration due to the potential for falsely depressed results  AST(SGOT) 16 U/L  5-45   Specimen collection should occur prior to Sulfasalazine administration due to the potential for falsely depressed results     ALBUMIN 3 4 g/dL L 3 5-5 0   TOTAL PROTEIN 6 7 g/dL  6 4-8 2   eGFR 50 ml/min/1 73sq [facility] Disease Education Program recommendations are as follows:  GFR calculation is accurate only with a steady state creatinine  Chronic Kidney disease less than 60 ml/min/1 73 sq  meters  Kidney failure less than 15 ml/min/1 73 sq  meters  GLUCOSE FASTING 88 mg/dL  65-99   Specimen collection should occur prior to Sulfasalazine administration due to the potential for falsely depressed results  Specimen collection should occur prior to Sulfapyridine administration due to the potential for falsely elevated results  (1) LIPID PANEL, FASTING [id number] 39:49EV Eloise Monroyxon Order Number: [id number]     Test Name Result Flag Reference   CHOLESTEROL 158 mg/dL     HDL,DIRECT 62 mg/dL H 40-60   Specimen collection should occur prior to Metamizole administration due to the potential for falsley depressed results  LDL CHOLESTEROL CALCULATED 81 mg/dL  0-100   Triglyceride:        Normal <150 mg/dl   Borderline High 150-199 mg/dl   High 200-499 mg/dl   Very High >499 mg/dl      Cholesterol:       Desirable <200 mg/dl    Borderline High 200-239 mg/dl    High >239 mg/dl      HDL Cholesterol:       High>59 mg/dL    Low <41 mg/dL      This screening LDL is a calculated result  It does not have the accuracy of the Direct Measured LDL in the monitoring of patients with hyperlipidemia and/or statin therapy  Direct Measure LDL (TDF591) must be ordered separately in these patients  TRIGLYCERIDES 76 mg/dL  <=150   Specimen collection should occur prior to N-Acetylcysteine or Metamizole administration due to the potential for falsely depressed results  (1) TSH [id number] 60:76EG Eloise Monroyxon Order Number: [id number]     Test Name Result Flag Reference   TSH 1 730 uIU/mL  0 358-3 740   Patients undergoing fluorescein dye angiography may retain small amounts of fluorescein in the body for 48-72 hours post procedure   Samples containing fluorescein can produce falsely depressed TSH values  If the patient had this procedure,a specimen should be resubmitted post fluorescein clearance            The recommended reference ranges for TSH during pregnancy are as follows:  First trimester 0 1 to 2 5 uIU/mL  Second trimester  0 2 to 3 0 uIU/mL  Third trimester 0 3 to 3 0 uIU/m

## 2018-02-09 ENCOUNTER — HOSPITAL ENCOUNTER (EMERGENCY)
Facility: HOSPITAL | Age: 83
End: 2018-02-09
Attending: EMERGENCY MEDICINE | Admitting: EMERGENCY MEDICINE
Payer: MEDICARE

## 2018-02-09 ENCOUNTER — APPOINTMENT (EMERGENCY)
Dept: CT IMAGING | Facility: HOSPITAL | Age: 83
End: 2018-02-09
Payer: MEDICARE

## 2018-02-09 ENCOUNTER — HOSPITAL ENCOUNTER (INPATIENT)
Facility: HOSPITAL | Age: 83
LOS: 3 days | Discharge: NURSING, REHAB OR LONG-TERM CARE | DRG: 087 | End: 2018-02-12
Attending: SURGERY | Admitting: SURGERY
Payer: MEDICARE

## 2018-02-09 ENCOUNTER — APPOINTMENT (EMERGENCY)
Dept: RADIOLOGY | Facility: HOSPITAL | Age: 83
DRG: 087 | End: 2018-02-09
Payer: MEDICARE

## 2018-02-09 ENCOUNTER — APPOINTMENT (EMERGENCY)
Dept: RADIOLOGY | Facility: HOSPITAL | Age: 83
End: 2018-02-09
Payer: MEDICARE

## 2018-02-09 VITALS
RESPIRATION RATE: 18 BRPM | HEART RATE: 87 BPM | DIASTOLIC BLOOD PRESSURE: 67 MMHG | SYSTOLIC BLOOD PRESSURE: 156 MMHG | OXYGEN SATURATION: 99 % | TEMPERATURE: 97.3 F

## 2018-02-09 DIAGNOSIS — I62.00 SUBDURAL HEMORRHAGE (HCC): Primary | ICD-10-CM

## 2018-02-09 DIAGNOSIS — S06.2X0A CONTUSION OF BRAIN WITHOUT LOSS OF CONSCIOUSNESS, INITIAL ENCOUNTER (HCC): ICD-10-CM

## 2018-02-09 DIAGNOSIS — I60.9 SUBARACHNOID HEMORRHAGE (HCC): ICD-10-CM
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
## 2018-02-09 DIAGNOSIS — S06.5X9A SUBDURAL HEMATOMA (HCC): Primary | ICD-10-CM

## 2018-02-09 LAB
ALBUMIN SERPL BCP-MCNC: 3.7 G/DL (ref 3.5–5)
ALP SERPL-CCNC: 110 U/L (ref 46–116)
ALT SERPL W P-5'-P-CCNC: 26 U/L (ref 12–78)
ANION GAP SERPL CALCULATED.3IONS-SCNC: 11 MMOL/L (ref 4–13)
APTT PPP: 27 SECONDS (ref 23–35)
AST SERPL W P-5'-P-CCNC: 22 U/L (ref 5–45)
BASOPHILS # BLD AUTO: 0.01 THOUSANDS/ΜL (ref 0–0.1)
BASOPHILS NFR BLD AUTO: 0 % (ref 0–1)
BILIRUB SERPL-MCNC: 0.7 MG/DL (ref 0.2–1)
BUN SERPL-MCNC: 25 MG/DL (ref 5–25)
CALCIUM SERPL-MCNC: 9.5 MG/DL (ref 8.3–10.1)
CHLORIDE SERPL-SCNC: 103 MMOL/L (ref 100–108)
CLARITY, POC: CLEAR
CO2 SERPL-SCNC: 26 MMOL/L (ref 21–32)
COLOR, POC: YELLOW
CREAT SERPL-MCNC: 1.02 MG/DL (ref 0.6–1.3)
EOSINOPHIL # BLD AUTO: 0.04 THOUSAND/ΜL (ref 0–0.61)
EOSINOPHIL NFR BLD AUTO: 1 % (ref 0–6)
ERYTHROCYTE [DISTWIDTH] IN BLOOD BY AUTOMATED COUNT: 14.3 % (ref 11.6–15.1)
EXT BILIRUBIN, UA: NORMAL
EXT BLOOD URINE: NORMAL
EXT GLUCOSE, UA: NORMAL
EXT KETONES: 40
EXT NITRITE, UA: NORMAL
EXT PH, UA: 8.5
EXT PROTEIN, UA: NORMAL
EXT SPECIFIC GRAVITY, UA: 1.01
EXT UROBILINOGEN: 0.2
GFR SERPL CREATININE-BSD FRML MDRD: 50 ML/MIN/1.73SQ M
GLUCOSE SERPL-MCNC: 164 MG/DL (ref 65–140)
HCT VFR BLD AUTO: 44.1 % (ref 34.8–46.1)
HGB BLD-MCNC: 14.5 G/DL (ref 11.5–15.4)
INR PPP: 0.91 (ref 0.86–1.16)
LYMPHOCYTES # BLD AUTO: 0.86 THOUSANDS/ΜL (ref 0.6–4.47)
LYMPHOCYTES NFR BLD AUTO: 10 % (ref 14–44)
MCH RBC QN AUTO: 29.4 PG (ref 26.8–34.3)
MCHC RBC AUTO-ENTMCNC: 32.9 G/DL (ref 31.4–37.4)
MCV RBC AUTO: 89 FL (ref 82–98)
MONOCYTES # BLD AUTO: 0.53 THOUSAND/ΜL (ref 0.17–1.22)
MONOCYTES NFR BLD AUTO: 6 % (ref 4–12)
NEUTROPHILS # BLD AUTO: 7.44 THOUSANDS/ΜL (ref 1.85–7.62)
NEUTS SEG NFR BLD AUTO: 83 % (ref 43–75)
PLATELET # BLD AUTO: 184 THOUSANDS/UL (ref 149–390)
PMV BLD AUTO: 10 FL (ref 8.9–12.7)
POTASSIUM SERPL-SCNC: 3.6 MMOL/L (ref 3.5–5.3)
PROT SERPL-MCNC: 6.9 G/DL (ref 6.4–8.2)
PROTHROMBIN TIME: 12.5 SECONDS (ref 12.1–14.4)
RBC # BLD AUTO: 4.94 MILLION/UL (ref 3.81–5.12)
SODIUM SERPL-SCNC: 140 MMOL/L (ref 136–145)
TROPONIN I SERPL-MCNC: 0.04 NG/ML
WBC # BLD AUTO: 8.88 THOUSAND/UL (ref 4.31–10.16)
WBC # BLD EST: NORMAL 10*3/UL

## 2018-02-09 PROCEDURE — 71046 X-RAY EXAM CHEST 2 VIEWS: CPT

## 2018-02-09 PROCEDURE — 73521 X-RAY EXAM HIPS BI 2 VIEWS: CPT

## 2018-02-09 PROCEDURE — 85025 COMPLETE CBC W/AUTO DIFF WBC: CPT | Performed by: EMERGENCY MEDICINE

## 2018-02-09 PROCEDURE — 72100 X-RAY EXAM L-S SPINE 2/3 VWS: CPT

## 2018-02-09 PROCEDURE — 72125 CT NECK SPINE W/O DYE: CPT

## 2018-02-09 PROCEDURE — 81002 URINALYSIS NONAUTO W/O SCOPE: CPT | Performed by: EMERGENCY MEDICINE

## 2018-02-09 PROCEDURE — 70450 CT HEAD/BRAIN W/O DYE: CPT

## 2018-02-09 PROCEDURE — 85730 THROMBOPLASTIN TIME PARTIAL: CPT | Performed by: EMERGENCY MEDICINE

## 2018-02-09 PROCEDURE — 96374 THER/PROPH/DIAG INJ IV PUSH: CPT

## 2018-02-09 PROCEDURE — 36415 COLL VENOUS BLD VENIPUNCTURE: CPT | Performed by: EMERGENCY MEDICINE

## 2018-02-09 PROCEDURE — 73564 X-RAY EXAM KNEE 4 OR MORE: CPT

## 2018-02-09 PROCEDURE — 96361 HYDRATE IV INFUSION ADD-ON: CPT

## 2018-02-09 PROCEDURE — 93005 ELECTROCARDIOGRAM TRACING: CPT

## 2018-02-09 PROCEDURE — 99291 CRITICAL CARE FIRST HOUR: CPT | Performed by: SURGERY

## 2018-02-09 PROCEDURE — 85610 PROTHROMBIN TIME: CPT | Performed by: EMERGENCY MEDICINE

## 2018-02-09 PROCEDURE — 99285 EMERGENCY DEPT VISIT HI MDM: CPT

## 2018-02-09 PROCEDURE — 80053 COMPREHEN METABOLIC PANEL: CPT | Performed by: EMERGENCY MEDICINE

## 2018-02-09 PROCEDURE — 84484 ASSAY OF TROPONIN QUANT: CPT | Performed by: EMERGENCY MEDICINE

## 2018-02-09 PROCEDURE — 93010 ELECTROCARDIOGRAM REPORT: CPT | Performed by: INTERNAL MEDICINE

## 2018-02-09 RX ORDER — ONDANSETRON 2 MG/ML
4 INJECTION INTRAMUSCULAR; INTRAVENOUS EVERY 6 HOURS PRN
Status: DISCONTINUED | OUTPATIENT
Start: 2018-02-09 | End: 2018-02-12 | Stop reason: HOSPADM

## 2018-02-09 RX ORDER — PRAVASTATIN SODIUM 40 MG
40 TABLET ORAL
Status: DISCONTINUED | OUTPATIENT
Start: 2018-02-10 | End: 2018-02-12 | Stop reason: HOSPADM

## 2018-02-09 RX ORDER — POTASSIUM CHLORIDE 750 MG/1
10 TABLET, EXTENDED RELEASE ORAL DAILY
Status: DISCONTINUED | OUTPATIENT
Start: 2018-02-10 | End: 2018-02-12 | Stop reason: HOSPADM

## 2018-02-09 RX ORDER — ACETAMINOPHEN 325 MG/1
650 TABLET ORAL EVERY 6 HOURS PRN
Status: DISCONTINUED | OUTPATIENT
Start: 2018-02-09 | End: 2018-02-12

## 2018-02-09 RX ORDER — SENNOSIDES 8.6 MG
1 TABLET ORAL 2 TIMES DAILY
Status: DISCONTINUED | OUTPATIENT
Start: 2018-02-09 | End: 2018-02-12 | Stop reason: HOSPADM

## 2018-02-09 RX ORDER — B-COMPLEX WITH VITAMIN C
1 TABLET ORAL DAILY
Status: DISCONTINUED | OUTPATIENT
Start: 2018-02-10 | End: 2018-02-12 | Stop reason: HOSPADM

## 2018-02-09 RX ORDER — ONDANSETRON 2 MG/ML
4 INJECTION INTRAMUSCULAR; INTRAVENOUS ONCE
Status: COMPLETED | OUTPATIENT
Start: 2018-02-09 | End: 2018-02-09

## 2018-02-09 RX ORDER — ACETAMINOPHEN 325 MG/1
975 TABLET ORAL ONCE
Status: COMPLETED | OUTPATIENT
Start: 2018-02-09 | End: 2018-02-09

## 2018-02-09 RX ORDER — FUROSEMIDE 20 MG/1
20 TABLET ORAL DAILY
Status: DISCONTINUED | OUTPATIENT
Start: 2018-02-10 | End: 2018-02-12 | Stop reason: HOSPADM

## 2018-02-09 RX ADMIN — ACETAMINOPHEN 975 MG: 325 TABLET, FILM COATED ORAL at 18:37

## 2018-02-09 RX ADMIN — SODIUM CHLORIDE 1000 ML: 0.9 INJECTION, SOLUTION INTRAVENOUS at 18:49

## 2018-02-09 RX ADMIN — ONDANSETRON 4 MG: 2 INJECTION INTRAMUSCULAR; INTRAVENOUS at 19:20

## 2018-02-09 NOTE — ED PROVIDER NOTES
History  Chief Complaint   Patient presents with    Fall     Pt arrvied via EMS after falling while shoveling snow  Denies head injury  Pt reports feeling "lightheaded, but not"  59-year-old female presents to the emergency department for evaluation following fall  She relates that she was not supposed to be outside or shoveling snow but that she had been  earlier today  Son estimates this was around 3 something  Patient describes knowing that she wanted to step from 1 place to another  She relates the next thing she knew she had fallen onto her bottom  She denies having lost consciousness or having hit her head  She denies having any headache though feels dizzy with slight head movement  Son shares that her nephew found her outside after she had been in the snow for several minutes  He and a neighbor were unable to assist patient up from the ground  They note that she was not moving the right leg well at all  She does have significant stiffness and ambulatory difficulty at baseline  Patient denies having any chest pain or shortness of breath  No palpitations  Intermittent nausea with the lightheadedness  She reports having felt well prior to shoveling snow today  History of hypertension, bradycardia and subarachnoid hemorrhage a few years ago  Etiology of the hemorrhage was unknown to patient and son  There was no known trauma around that time  Patient shares that she does have a history of compression fractures in the lower back  This was a few years ago as well  Pt  Is not on any antiplatelet or anticoagulant medications  Prior to Admission Medications   Prescriptions Last Dose Informant Patient Reported? Taking? Multiple Vitamin (MULTIVITAMIN) tablet   Yes Yes   Sig: Take 1 tablet by mouth daily     acetaminophen (TYLENOL) 325 mg tablet   No No   Sig: Take 2 tablets by mouth every 6 (six) hours as needed for mild pain   calcium-vitamin D (OSCAL 500 + D) 500 mg-200 units per tablet   Yes Yes   Sig: Take 1 tablet by mouth daily  1000 iu   furosemide (LASIX) 20 mg tablet   Yes Yes   Sig: Take 20 mg by mouth daily  polyethylene glycol (MIRALAX) 17 g packet   No Yes   Sig: Take 17 g by mouth daily for 7 days   potassium chloride (K-DUR) 10 mEq tablet   Yes Yes   Sig: Take 10 mEq by mouth daily  senna (SENOKOT) 8 6 mg   No No   Sig: Take 1 tablet by mouth 2 (two) times a day for 7 days   Patient taking differently: Take 1 tablet by mouth daily     simvastatin (ZOCOR) 40 mg tablet   Yes Yes   Sig: Take 40 mg by mouth daily at bedtime  Facility-Administered Medications: None       Past Medical History:   Diagnosis Date    Amaurosis fugax 11/18/2016    Arthritis     History of subarachnoid hemorrhage     Hyperlipidemia     Hypertension     Nephrolithiasis        Past Surgical History:   Procedure Laterality Date    LITHOTRIPSY      MA FRAGMENT KIDNEY STONE/ ESWL Left 1/3/2017    Procedure: LITHROTRIPSY EXTRACORPORAL SHOCKWAVE (ESWL); Surgeon: Vanessa Rizvi MD;  [facility];  Service: Urology    MA FRAGMENT KIDNEY STONE/ ESWL Right 11/18/2016    Procedure: Cathereronald Jeffries SHOCKWAVE (ESWL); Surgeon: Vanessa Rizvi MD;  [facility];  Service: Urology    URETERAL STENT PLACEMENT Bilateral 11/9/2016    Procedure: Susa Ovens; STENT INSERTION ;  Surgeon: Vanessa Rizvi MD;  [facility];  Service:        Family History   Problem Relation Age of Onset    Pneumonia Mother     Cancer Sister      I have reviewed and agree with the history as documented  Social History   Substance Use Topics    Smoking status: Never Smoker    Smokeless tobacco: Never Used    Alcohol use No        Review of Systems   Constitutional: Negative for activity change and fever  All other systems reviewed and are negative        Physical Exam  ED Triage Vitals [02/09/18 1650]   Temperature Pulse Respirations Blood Pressure SpO2   (!) 97 3 °F (36 3 °C) 77 22 (!) 213/84 99 %      Temp Source Heart Rate Source Patient Position - Orthostatic VS BP Location FiO2 (%)   Axillary Monitor Lying Right arm --      Pain Score       --           Orthostatic Vital Signs  Vitals:    02/09/18 1650 02/09/18 1854 02/09/18 2012   BP: (!) 213/84 (!) 192/79 156/67   Pulse: 77 82 87   Patient Position - Orthostatic VS: Lying Sitting Lying       Physical Exam   Constitutional: She is oriented to person, place, and time  She appears well-developed and well-nourished  Appears comfortable at rest though is uncomfortable appearing upon sitting forward  Requires much assistance to do so  HENT:   Head: Normocephalic and atraumatic  Eyes: Conjunctivae and EOM are normal    Neck: Normal range of motion  Neck supple  Cardiovascular: Normal rate and regular rhythm  Pulmonary/Chest: Effort normal and breath sounds normal    Abdominal: Soft  Musculoskeletal:   No cervical or thoracic midline tenderness  Mild low lumbar / sacral tenderness  Tenderness additionally over the right upper thoracic region  Patient with tenderness over the bilateral hips and right knee  She is able to fully range the upper extremities  Limited flexion of the bilateral hips  Good strength with dorsi plantar flexion bilaterally  Neurological: She is alert and oriented to person, place, and time  Clear speech  No facial asymmetry/ deficit appreciated  EOMI  No nystagmus  Strong eye closure & symmetric brow raise  Ability to insufflate cheeks, protrude tongue midline & range this laterally  Symmetric/ intact sensation in the upper, mid & lower portions of the face  5/5 strength on head turn, shoulder shrug, bicep &tricep movement against resistance b/l   5/5 strength on b/l hand , pincer grasp, finger abduction, dorsi & volar flexion  4/5 strength on bilateral hip flexion 5/5 strength b/l dorsi & plantar flexion  Symmetric grossly intact sensation in the UE & LE     Skin: Skin is warm and dry  Psychiatric: She has a normal mood and affect  Her behavior is normal    Nursing note and vitals reviewed  ED Medications  Medications   acetaminophen (TYLENOL) tablet 975 mg (975 mg Oral Given 2/9/18 1837)   sodium chloride 0 9 % bolus 1,000 mL (0 mL Intravenous Stopped 2/9/18 2012)   ondansetron (ZOFRAN) injection 4 mg (4 mg Intravenous Given 2/9/18 1920)       Diagnostic Studies  Results Reviewed     Procedure Component Value Units Date/Time    POCT urinalysis dipstick [[id number]]  (Normal) Resulted:  02/09/18 2039    Lab Status:  Final result Specimen:  Urine Updated:  02/09/18 2041     Color, UA yellow     Clarity, UA clear     EXT Glucose, UA neg     EXT Bilirubin, UA (Ref: Negative) neg     EXT Ketones, UA (Ref: Negative) 40     EXT Spec Grav, UA 1 015     EXT Blood, UA (Ref: Negative) trace     EXT pH, UA 8 5     EXT Protein, UA (Ref: Negative) neg     EXT Urobilinogen, UA (Ref: 0 2- 1 0) 0 2     EXT Leukocytes, UA (Ref: Negative) neg     EXT Nitrite, UA (Ref: Negative) neg    Comprehensive metabolic panel [[id number]]  (Abnormal) Collected:  02/09/18 1848    Lab Status:  Final result Specimen:  Blood from Arm, Right Updated:  02/09/18 1931     Sodium 140 mmol/L      Potassium 3 6 mmol/L      Chloride 103 mmol/L      CO2 26 mmol/L      Anion Gap 11 mmol/L      BUN 25 mg/dL      Creatinine 1 02 mg/dL      Glucose 164 (H) mg/dL      Calcium 9 5 mg/dL      AST 22 U/L      ALT 26 U/L      Alkaline Phosphatase 110 U/L      Total Protein 6 9 g/dL      Albumin 3 7 g/dL      Total Bilirubin 0 70 mg/dL      eGFR 50 ml/min/1 73sq m     Narrative:         National Kidney Disease Education Program recommendations are as follows:  GFR calculation is accurate only with a steady state creatinine  Chronic Kidney disease less than 60 ml/min/1 73 sq  meters  Kidney failure less than 15 ml/min/1 73 sq  meters      Troponin I [[id number]]  (Normal) Collected:  02/09/18 1848    Lab Status:  Final result Specimen:  Blood from Arm, Right Updated:  02/09/18 1926     Troponin I 0 04 ng/mL     Narrative:         [employer] Chemistry analyzer 99% cutoff is > 0 04 ng/mL in network labs    o cTnI 99% cutoff is useful only when applied to patients in the clinical setting of myocardial ischemia  o cTnI 99% cutoff should be interpreted in the context of clinical history, ECG findings and possibly cardiac imaging to establish correct diagnosis  o cTnI 99% cutoff may be suggestive but clearly not indicative of a coronary event without the clinical setting of myocardial ischemia  CBC and differential [[id number]]  (Abnormal) Collected:  02/09/18 1848    Lab Status:  Final result Specimen:  Blood from Arm, Right Updated:  02/09/18 1924     WBC 8 88 Thousand/uL      RBC 4 94 Million/uL      Hemoglobin 14 5 g/dL      Hematocrit 44 1 %      MCV 89 fL      MCH 29 4 pg      MCHC 32 9 g/dL      RDW 14 3 %      MPV 10 0 fL      Platelets 537 Thousands/uL      Neutrophils Relative 83 (H) %      Lymphocytes Relative 10 (L) %      Monocytes Relative 6 %      Eosinophils Relative 1 %      Basophils Relative 0 %      Neutrophils Absolute 7 44 Thousands/µL      Lymphocytes Absolute 0 86 Thousands/µL      Monocytes Absolute 0 53 Thousand/µL      Eosinophils Absolute 0 04 Thousand/µL      Basophils Absolute 0 01 Thousands/µL     Protime-INR [[id number]]  (Normal) Collected:  02/09/18 1848    Lab Status:  Final result Specimen:  Blood from Arm, Right Updated:  02/09/18 1921     Protime 12 5 seconds      INR 0 91    APTT [[id number]]  (Normal) Collected:  02/09/18 1848    Lab Status:  Final result Specimen:  Blood from Arm, Right Updated:  02/09/18 1921     PTT 27 seconds     Narrative: Therapeutic Heparin Range = 60-90 seconds                 XR chest 2 views   ED Interpretation by Valentín Mcgregor MD (02/09 1840)   Unremarkable cardiac silhouette  Clear lungs        XR lumbar spine 2 or 3 views   ED Interpretation by Valentín Mcgregor MD (02/09 1839)   Extensive DJD  No fracture appreciated  XR knee 4+ views Right injury   ED Interpretation by Elgin Vicente MD (02/09 1838)   No fracture      XR hips bilateral with ap pelvis 2 vw   ED Interpretation by Elgin Vicente MD (02/09 1837)   No fx  CT head without contrast   Final Result by Heather Olivas MD (02/09 1827)      Extra-axial hemorrhage along the left side of the falx cerebri in keeping with a combination of subdural and subarachnoid hemorrhage  The largest focus measures 2 5 x 1 4 x 3 1 cm  There is also left frontal subdural hemorrhage measuring 4 mm in thickness and 3 3 cm in length  I personally discussed this study with Dr Gin Womack on 2/9/2018 at 6:26 PM                Workstation performed: [id number]                    Procedures  ECG 12 Lead Documentation  Date/Time: 2/9/2018 7:01 PM  Performed by: Kyle Aguilera  Authorized by: Kyle Aguilera     ECG reviewed by me, the ED Provider: yes    Patient location:  ED and bedside  Rate:     ECG rate:  80    ECG rate assessment: normal    Rhythm:     Rhythm: sinus rhythm    Ectopy:     Ectopy: none    QRS:     QRS axis:  Left  Conduction:     Conduction: normal    ST segments:     ST segments:  Normal  T waves:     T waves: normal             Phone Contacts  ED Phone Contact    ED Course  ED Course as of Feb 10 1240   Fri Feb 09, 2018   1902  Patient and son were updated on study results  Transfer Center was contacted a few minutes ago regarding desire to transfer patient to [facility]  Patient denies having any head pain while at rest though feels dizzy and nauseous upon any head movement  Ondansetron as ordered  1946   Nausea improved at this time  Blood pressure has lowered to 164/70                                  [facility]  CritCare Time    Disposition  Final diagnoses:   Subdural hemorrhage ([address] )   Subarachnoid hemorrhage ([facility] [address] )   Fall, initial encounter Time reflects when diagnosis was documented in both MDM as applicable and the Disposition within this note     Time User Action Codes Description Comment    2/9/2018  7:50 PM Arlyce Simmonds A Add [I62 00] Subdural hemorrhage ([facility] [address] )     2/9/2018  7:50 PM Arlyce Simmonds A Add [I60 9] Subarachnoid hemorrhage ([facility] [address] )     2/9/2018  7:50 PM Arlyce Simmonds A Add [L22  XXXA] Fall, initial encounter       ED Disposition     ED Disposition Condition Comment    Transfer to [facility] Inches should be transferred out to B  MD Documentation    Blanca Gage Most Recent Value   Patient Condition  The patient has been stabilized such that within reasonable medical probability, no material deterioration of the patient condition or the condition of the unborn child(mann) is likely to result from the transfer   Reason for Transfer  Level of Care needed not available at this facility   Benefits of Transfer  Specialized equipment and/or services available at the receiving facility (Include comment)________________________ Kayleeparminder Maurice   Accepting Physician  Dr Juana Rae Name, [address] [place] [address]   Sending MD Dr Lake Tucker   Provider Certification  General risk, such as traffic hazards, adverse weather conditions, rough terrain or turbulence, possible failure of equipment (including vehicle or aircraft), or consequences of actions of persons outside the control of the transport personnel      RN Documentation    72 Kayy Rob Name, [facility]      Follow-up Information    None       Discharge Medication List as of 2/9/2018  8:44 PM      CONTINUE these medications which have NOT CHANGED    Details   calcium-vitamin D (OSCAL 500 + D) 500 mg-200 units per tablet Take 1 tablet by mouth daily   1000 iu, Until Discontinued, Historical Med      furosemide (LASIX) 20 mg tablet Take 20 mg by mouth daily  , Until Discontinued, Historical Med      Multiple Vitamin (MULTIVITAMIN) tablet Take 1 tablet by mouth daily  , Until Discontinued, Historical Med      polyethylene glycol (MIRALAX) 17 g packet Take 17 g by mouth daily for 7 days, Starting Fri 11/11/2016, Until Fri 2/9/2018, Print      potassium chloride (K-DUR) 10 mEq tablet Take 10 mEq by mouth daily  , Until Discontinued, Historical Med      simvastatin (ZOCOR) 40 mg tablet Take 40 mg by mouth daily at bedtime  , Until Discontinued, Historical Med      acetaminophen (TYLENOL) 325 mg tablet Take 2 tablets by mouth every 6 (six) hours as needed for mild pain, Starting 11/11/2016, Until Discontinued, Print      senna (SENOKOT) 8 6 mg Take 1 tablet by mouth 2 (two) times a day for 7 days, Starting 11/11/2016, Until Fri 11/18/16, Print           No discharge procedures on file      ED Provider  Electronically Signed by           Zully Contreras MD  02/10/18 7252

## 2018-02-10 ENCOUNTER — APPOINTMENT (INPATIENT)
Dept: RADIOLOGY | Facility: HOSPITAL | Age: 83
DRG: 087 | End: 2018-02-10
Payer: MEDICARE

## 2018-02-10 PROBLEM — M51.9 LUMBAR DISC DISEASE: Status: ACTIVE | Noted: 2018-02-10
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
## 2018-02-10 LAB — TROPONIN I SERPL-MCNC: 0.02 NG/ML

## 2018-02-10 PROCEDURE — 97162 PT EVAL MOD COMPLEX 30 MIN: CPT

## 2018-02-10 PROCEDURE — 99285 EMERGENCY DEPT VISIT HI MDM: CPT

## 2018-02-10 PROCEDURE — 84484 ASSAY OF TROPONIN QUANT: CPT | Performed by: NURSE PRACTITIONER

## 2018-02-10 PROCEDURE — G8978 MOBILITY CURRENT STATUS: HCPCS

## 2018-02-10 PROCEDURE — G8979 MOBILITY GOAL STATUS: HCPCS

## 2018-02-10 PROCEDURE — 99223 1ST HOSP IP/OBS HIGH 75: CPT | Performed by: NEUROLOGICAL SURGERY

## 2018-02-10 PROCEDURE — 99232 SBSQ HOSP IP/OBS MODERATE 35: CPT | Performed by: SURGERY

## 2018-02-10 PROCEDURE — 70450 CT HEAD/BRAIN W/O DYE: CPT

## 2018-02-10 RX ADMIN — CALCIUM CARBONATE 500 MG (1,250 MG)-VITAMIN D3 200 UNIT TABLET 1 TABLET: at 08:57

## 2018-02-10 RX ADMIN — FUROSEMIDE 20 MG: 20 TABLET ORAL at 08:57

## 2018-02-10 RX ADMIN — PRAVASTATIN SODIUM 40 MG: 40 TABLET ORAL at 16:59

## 2018-02-10 RX ADMIN — POTASSIUM CHLORIDE 10 MEQ: 750 TABLET, EXTENDED RELEASE ORAL at 08:57

## 2018-02-10 NOTE — TERTIARY TRAUMA SURVEY
Progress Note - Tertiary Trauma Survery   Jakc Bueno 80 y o  female MRN: [id number]  Unit/Bed#: [facility] 910-01 Encounter: [id number]    Summary of Diagnosed Injuries:   SDH  Bruise left lumbar/flank  Fall - possible presyncope/syncope    Clinical Plan:     Fall   Assessment & Plan    - pt describes sudden weakness in her legs and presyncope  She describes lightheadedness intermittently at baseline but reports this felt different  She denies any associated symptoms such as CP, SOB, seeing stars/spots/or a black curtain  She is unable to describe the feeling further and reports she has never had this sensation previously  - check ECG, trop x 1  - PT/OT today         Lumbar disc disease   Assessment & Plan    - history of 2 "completely ruptured discs" in the low back/lumbar region according to patient  - no paresthesias or numbness but does use a cane related to this and her arthritis  She has chronic weakness in the right leg compared to the left which she believes is related to her disc disease/arthritis and has been ongoing for years   - bruise over left lumbar region with some tenderness (midline and paraspinal) on exam  XRay completed, but not read  Chronic changes appreciated  Will follow-up read for any acute process        * Subdural hematoma (HCC)   Assessment & Plan    - HOT protocol, neuro checks  - NSG consult - follow-up recs   - repeat head CT this AM stable  - GCS 15  - denies blood thinners            Mechanism of Injury: Fall    Transfer from: [facility]  Outside Films Received: yes  Tertiary Exam Due on: today    Vitals: Blood pressure 127/60, pulse 79, temperature 98 1 °F (36 7 °C), temperature source Oral, resp  rate 18, height 5' 2" (1 575 m), weight 81 2 kg (179 lb), SpO2 98 %  ,Body mass index is 32 74 kg/m²      CT / RADIOGRAPHS: ALL RESULTS MUST BE CONFIRMED BY FACULTY OR PRINTED REPORT    CT HEAD: Extra-axial hemorrhage along the left side of the falx cerebri in keeping with a combination of subdural and subarachnoid hemorrhage  The largest focus measures 2 5 x 1 4 x 3 1 cm      There is also left frontal subdural hemorrhage measuring 4 mm in thickness and 3 3 cm in length  Repeat:    No significant interval change of left parafalcine frontal intraparenchymal, left parafalcine in vertex subdural hemorrhage and parafalcine left greater than right subarachnoid hemorrhage      Left posterior parietal-occipital subdural hemorrhage attributed to dependent redistribution from prior subdural hemorrhage  CT CHEST:    CT FACE:  CT ABDOMEN / PELVIS:   CT CERVICAL SPINE: No cervical spine fracture or traumatic malalignment      2 3 cm left thyroid nodule  Incidental discovery of one or more thyroid nodule(s) measuring more than 1 5 cm and without suspicious features is noted in this patient who is above 28years old; according to guidelines published in the February 2015 white   paper on incidental thyroid nodules in the Journal of the [employer] of Radiology Marichuy Clark), further characterization with thyroid ultrasound is recommended   XR PELVIS: pending - no apparent fracture   CT THORACIC / LUMBAR SPINE:  CXR CHEST: no acute abnormality    OTHER: lumbar XRay pending  OTHER: XRay knee pending    OTHER:  OTHER:    OTHER: OTHER:    OTHER:  OTHER:    OTHER:  OTHER:      Consultants - List Service/ Faculty and Date:   Neurosurgery  PT/OT  CM    Active medications:           Current Facility-Administered Medications:     acetaminophen (TYLENOL) tablet 650 mg, 650 mg, Oral, Q6H PRN    calcium carbonate-vitamin D (OSCAL-D) 500 mg-200 units per tablet 1 tablet, 1 tablet, Oral, Daily, 1 tablet at 02/10/18 0857    furosemide (LASIX) tablet 20 mg, 20 mg, Oral, Daily, 20 mg at 02/10/18 0857    ondansetron (ZOFRAN) injection 4 mg, 4 mg, Intravenous, Q6H PRN    potassium chloride (K-DUR,KLOR-CON) CR tablet 10 mEq, 10 mEq, Oral, Daily, 10 mEq at 02/10/18 0857    pravastatin (PRAVACHOL) tablet 40 mg, 40 mg, Oral, Daily With Dinner    senna (SENOKOT) tablet 8 6 mg, 1 tablet, Oral, BID      Intake/Output Summary (Last 24 hours) at 02/10/18 1043  Last data filed at 02/10/18 0900   Gross per 24 hour   Intake              380 ml   Output              725 ml   Net             -345 ml       Invasive Devices     Peripheral Intravenous Line            Peripheral IV 02/09/18 Left Hand less than 1 day    Peripheral IV 02/09/18 Right Antecubital less than 1 day                CAGE-AID Questionnaire:    Was the patient able to participate in the CAGE-AID screening questions on admission? No:   1  Does the patient consume alcohol? a  NO-Patient does not consume alcohol     2  Does the patient use street drugs or abuse prescription drugs? a  NO-Patient does not use street drugs or abuse prescription drugs    Did the patient answer YES in one of the questions above? No      ROS: Pt admits to chronic lumbar back pain, denies anything that seems new to her  Describes the fall os almost presyncopal, denies brook blackout or syncope though  States she has never had a sensation like that before and denies any associated symptoms such as SOB or CP prior to event  She denies headache, change in vision, nausea, vomiting or constipation  Denies any numbness, paresthesias or new weakness, admits to chronic RLE weakness  She denies any areas of bruising, swelling, pain or limited ROM       Is the patient 65 years or older: YES:    1  Before the illness or injury that brought you to the Emergency, did you need someone to help you on a regular basis? 1=Yes   2  Since the illness or injury that brought you to the Emergency, have you needed more help than usual to take care of yourself? 0=No   3  Have you been hospitalized for one or more nights during the past 6 months (excluding a stay in the Emergency Department)? 0=No   4  In general, do you see well? 0=Yes   5  In general, do you have serious problems with your memory? 0=No   6   Do you take more than three different medications everyday? 1=Yes   TOTAL   2     Did you order a geriatric consult if the score was 2 or greater?: yes    Constitution: patient lying in bed, appears comfortable  HEENT: normocephalic, atraumatic, 3 mm MICHAEL, clear speech  CV: regular rate and rhythm, L > R BLE edema +1, +1 DP pulses  Pulm: CTA, no wheezes, rhonchi or crackles, unlabored, equal bilaterally  Abd: soft, nontender, mildly distended, active bowel sounds  Musc: moves all extremities, RLE weaker than left, BUE equal, no numbness or loss of sensation  Neuro: A&O, no focal deficits  Skin: no rash or breakdown, warm      Do NOT use the following abbreviations: DTO, gr, Jordin, MS, MSO4, MgSO4, Nitro, QD, QID, QOD, u, , ?, ?g or trailing zeros   Always use a zero before a decimal     Labs:   CBC:   Lab Results   Component Value Date    WBC 8 88 02/09/2018    HGB 14 5 02/09/2018    HCT 44 1 02/09/2018    MCV 89 02/09/2018     02/09/2018    MCH 29 4 02/09/2018    MCHC 32 9 02/09/2018    RDW 14 3 02/09/2018    MPV 10 0 02/09/2018     CMP:   Lab Results   Component Value Date     02/09/2018     02/09/2018    CO2 26 02/09/2018    ANIONGAP 11 02/09/2018    BUN 25 02/09/2018    CREATININE 1 02 02/09/2018    GLUCOSE 164 (H) 02/09/2018    CALCIUM 9 5 02/09/2018    AST 22 02/09/2018    ALT 26 02/09/2018    ALKPHOS 110 02/09/2018    PROT 6 9 02/09/2018    BILITOT 0 70 02/09/2018    EGFR 50 02/09/2018     Phosphorus: No results found for: PHOS  Magnesium: No results found for: MG  Urinalysis:   Lab Results   Component Value Date    COLORU yellow 02/09/2018    CLARITYU clear 02/09/2018

## 2018-02-10 NOTE — EMTALA/ACUTE CARE TRANSFER
[address] [address] [address]  Dept: [phone]      EMTALA TRANSFER CONSENT    NAME Jack Bueno                                         1930                              MRN [id number]    I have been informed of my rights regarding examination, treatment, and transfer   by Dr Teresa Gaffney: Specialized equipment and/or services available at the receiving facility (Include comment)________________________ (Trauma service)    Risks:        Transfer Request   I acknowledge that my medical condition has been evaluated and explained to me by the emergency department physician or other qualified medical person and/or my attending physician who has recommended and offered to me further medical examination and treatment  I understand the Hospital's obligation with respect to the treatment and stabilization of my emergency medical condition  I nevertheless request to be transferred  I release the Hospital, the doctor, and any other persons caring for me from all responsibility or liability for any injury or ill effects that may result from my transfer and agree to accept all responsibility for the consequences of my choice to transfer, rather than receive stabilizing treatment at the Hospital  I understand that because the transfer is my request, my insurance may not provide reimbursement for the services  The Hospital will assist and direct me and my family in how to make arrangements for transfer, but the hospital is not liable for any fees charged by the transport service  In spite of this understanding, I refuse to consent to further medical examination and treatment which has been offered to me, and request transfer to  Delicia Rothman Name, [address] : [facility]   I authorize the performance of emergency medical procedures and treatments upon me in both transit and upon arrival at the receiving facility  Additionally, I authorize the release of any and all medical records to the receiving facility and request they be transported with me, if possible  I authorize the performance of emergency medical procedures and treatments upon me in both transit and upon arrival at the receiving facility  Additionally, I authorize the release of any and all medical records to the receiving facility and request they be transported with me, if possible  I understand that the safest mode of transportation during a medical emergency is an ambulance and that the Hospital advocates the use of this mode of transport  Risks of traveling to the receiving facility by car, including absence of medical control, life sustaining equipment, such as oxygen, and medical personnel has been explained to me and I fully understand them  (GETACHEW CORRECT BOX BELOW)  [  ]  I consent to the stated transfer and to be transported by ambulance/helicopter  [  ]  I consent to the stated transfer, but refuse transportation by ambulance and accept full responsibility for my transportation by car  I understand the risks of non-ambulance transfers and I exonerate the Hospital and its staff from any deterioration in my condition that results from this refusal     X___________________________________________    DATE  18  TIME________  Signature of patient or legally responsible individual signing on patient behalf           RELATIONSHIP TO PATIENT_________________________          Provider Certification    NAME John Emerson                                        [facility] 1930                              MRN [id number]    A medical screening exam was performed on the above named patient  Based on the examination:    Condition Necessitating Transfer The primary encounter diagnosis was Subdural hemorrhage ([address] )  Diagnoses of Subarachnoid hemorrhage ([address] ) and Fall, initial encounter were also pertinent to this visit      Patient Condition: The patient has been stabilized such that within reasonable medical probability, no material deterioration of the patient condition or the condition of the unborn child(mann) is likely to result from the transfer    Reason for Transfer: Level of Care needed not available at this facility    Transfer Requirements: [address]   · Space available and qualified personnel available for treatment as acknowledged by    · Agreed to accept transfer and to provide appropriate medical treatment as acknowledged by       Dr Rachael Smith  · Appropriate medical records of the examination and treatment of the patient are provided at the time of transfer   [facility], [address] _______  · Transfer will be performed by qualified personnel from    and appropriate transfer equipment as required, including the use of necessary and appropriate life support measures  Provider Certification: I have examined the patient and explained the following risks and benefits of being transferred/refusing transfer to the patient/family:  General risk, such as traffic hazards, adverse weather conditions, rough terrain or turbulence, possible failure of equipment (including vehicle or aircraft), or consequences of actions of persons outside the control of the transport personnel      Based on these reasonable risks and benefits to the patient and/or the unborn child(mann), and based upon the information available at the time of the patients examination, I certify that the medical benefits reasonably to be expected from the provision of appropriate medical treatments at another medical facility outweigh the increasing risks, if any, to the individuals medical condition, and in the case of labor to the unborn child, from effecting the transfer      X____________________________________________ DATE 02/09/18        TIME_______      ORIGINAL - SEND TO MEDICAL RECORDS   COPY - SEND WITH PATIENT DURING TRANSFER

## 2018-02-10 NOTE — ASSESSMENT & PLAN NOTE
- ECG without ischemic changes  - trop 0 02  - no arrhythmias on telemetry  - will do orthostatic BP's today    - pt admits to chronic lightheadedness/dizziness which is intermittent and has been on going for at least a year  - PT/OT - recommended for rehab, pt working with CM to choose a facility

## 2018-02-10 NOTE — ED NOTES
Pt transferred to B via EMS  No s/s of acute distress off unit   Report given to Nicole MAO at [facility]       Cyndi Rondon RN  02/09/18 2045

## 2018-02-10 NOTE — PHYSICAL THERAPY NOTE
Physical Therapy Cancellation Note    PT order received  Pt currently has active order for strict bed rest, placed 2/9/18 at 22:17  Pt will need updated activity orders when cleared for mobility  PT will f/u as able and as appropriate    Candy Garcia, PT

## 2018-02-10 NOTE — ASSESSMENT & PLAN NOTE
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

## 2018-02-10 NOTE — CONSULTS
Consultation - Neurosurgery   Elli Partida 80 y o  female MRN: [id number]  Unit/Bed#: [facility] 910-01 Encounter: [id number]      Assessment/Plan     Assessment:  1  Mechanical fall  2  Left parafalcine frontal intraparenchymal and left parafalcine SDH  3  History of prior SDH    Plan:  - seen with Dr Susan Ramos  - recommend repeat CT in the morning to ensure stability  - outpatient follow up with neurosurgery in 2 weeks with cat scan prior to visit    History of Present Illness   Consults    HPI: Elli Partida is a 80y o  year old female who presents to the trauma service following a slip and fall  She was shoveling snow when slipped and fell hitting her head,  She was unable to get up and her grandson summoned the ambulance  Upon arrival to the emergency room a cat scan showed a left frontal parafalcine SDH  Repeat scan appeared slightly worse  She does not take ASA or other blood thinners  She has chronic low back pain and chronic left lower extremity weakness not worse since the fall  Neurologically stable without focal findings  Consults    Review of Systems   Musculoskeletal: Positive for back pain (low back pain which is chronic)  Neurological: Positive for weakness (right lower extremity which is chronic)  All other systems reviewed and are negative  Historical Information   Past Medical History:   Diagnosis Date    Amaurosis fugax 11/18/2016    Arthritis     History of subarachnoid hemorrhage     Hyperlipidemia     Hypertension     Nephrolithiasis      Past Surgical History:   Procedure Laterality Date    LITHOTRIPSY      LA FRAGMENT KIDNEY STONE/ ESWL Left 1/3/2017    Procedure: LITHROTRIPSY EXTRACORPORAL SHOCKWAVE (ESWL); Surgeon: Kavita Lopez MD;  [facility];  Service: Urology    LA FRAGMENT KIDNEY STONE/ ESWL Right 11/18/2016    Procedure: Jacob Protestant SHOCKWAVE (ESWL);   Surgeon: Kavita Lopez MD;  [facility];  Service: Urology    URETERAL STENT PLACEMENT Bilateral 11/9/2016    Procedure: Theresa Estes; STENT INSERTION ;  Surgeon: Radha Ron MD;  [facility];  Service:      History   Alcohol Use No     History   Drug Use No     History   Smoking Status    Never Smoker   Smokeless Tobacco    Never Used     Family History   Problem Relation Age of Onset    Pneumonia Mother    Amalia Wells Cancer Sister        Meds/Allergies   all current active meds have been reviewed  No Known Allergies    Objective     Intake/Output Summary (Last 24 hours) at 02/10/18 1158  Last data filed at 02/10/18 0900   Gross per 24 hour   Intake              380 ml   Output              725 ml   Net             -345 ml       Physical Exam   Constitutional: She is oriented to person, place, and time  Elderly female   HENT:   Head: Normocephalic and atraumatic  Eyes: EOM are normal  Pupils are equal, round, and reactive to light  Neck: Normal range of motion  Neck supple  Cardiovascular: Normal rate  Pulmonary/Chest: Effort normal    Abdominal: Soft  Neurological: She is alert and oriented to person, place, and time  Skin: Skin is warm and dry  Psychiatric: She has a normal mood and affect  Her speech is normal      Neurologic Exam     Mental Status   Oriented to person, place, and time  Attention: normal    Speech: speech is normal   Level of consciousness: alert  Knowledge: good  Cranial Nerves   Cranial nerves II through XII intact  CN III, IV, VI   Pupils are equal, round, and reactive to light  Extraocular motions are normal      Motor Exam   Muscle bulk: normal  Overall muscle tone: normal    Strength   Strength 5/5 except as noted  Chronic weakness 4/5 in the right lower extremitiy, not worse since fall     Sensory Exam   Light touch normal      Gait, Coordination, and Reflexes     Gait  Gait: (has not ambulated yet)       Vitals:Blood pressure 127/60, pulse 79, temperature 98 1 °F (36 7 °C), temperature source Oral, resp   rate 18, height 5' 2" (1 575 m), weight 81 2 kg (179 lb), SpO2 98 %  ,Body mass index is 32 74 kg/m²  Lab Results: I have personally reviewed pertinent results  Imaging Studies: I have personally reviewed pertinent films in PACS and with Dr Graeme Jeans:     No significant interval change of left parafalcine frontal intraparenchymal, left parafalcine in vertex subdural hemorrhage and parafalcine left greater than right subarachnoid hemorrhage  Left posterior parietal-occipital subdural hemorrhage attributed to dependent redistribution from prior subdural hemorrhage         VTE Prophylaxis: Sequential compression device (Venodyne)

## 2018-02-10 NOTE — SOCIAL WORK
CM met with Pt and was also in contact with Pt's son/POA Jayson Hall per Pt's request with an introduction and explanation of role  Pt reported reside with her grandson Curry De La Rosa in a two story home with a cane, roller walker, shower chair and 2 steps to enter  Pt reported being independent with ADLs with no hx of VNA, SNF, mental health or drug/alcohol placement  Pt reported having a [facility] [address] [facility]  Pt uses [facility] in [place]  CM discussed rehab which both the Pt and son reported being agreeable to if recommended, and requested referral to first choice [facility], [facility] and then [facility]  CM made the requested referrals and will continue to follow  Jayson Hall requested his spouse Gabrielle Nicole be contacted while he is [facility] on business [phone]  CM reviewed d/c planning process including the following: identifying help at home, patient preference for d/c planning needs, Discharge Lounge, [facility] Meds to Bed program, availability of treatment team to discuss questions or concerns patient and/or family may have regarding understanding medications and recognizing signs and symptoms once discharged  CM also encouraged patient to follow up with all recommended appointments after discharge  Patient advised of importance for patient and family to participate in managing patients medical well being

## 2018-02-10 NOTE — CASE MANAGEMENT
Initial Clinical Review    Admission: Date/Time/Statement: 2/9/18 @ 2216     Orders Placed This Encounter   Procedures    Inpatient Admission     Standing Status:   Standing     Number of Occurrences:   1     Order Specific Question:   Admitting Physician     Answer:   Kyle Castle [[id number]]     Order Specific Question:   Level of Care     Answer:   Level 2 Stepdown / HOT [14]     Order Specific Question:   Estimated length of stay     Answer:   More than 2 Midnights     Order Specific Question:   Certification     Answer:   I certify that inpatient services are medically necessary for this patient for a duration of greater than two midnights  See H&P and MD Progress Notes for additional information about the patient's course of treatment  ED: Date/Time/Mode of Arrival:   ED Arrival Information     Expected Arrival Acuity Means of Arrival Escorted By Service Admission Type    - 2/9/2018 20:58 [facility] Tra[facility] Emergency    Arrival Complaint    fall          Chief Complaint:  Tx from [address] [place] [address]  Chief Complaint   Patient presents with    Head Injury     pt is trauma transfer from [facility]       History of Illness:  80 y o  female who presents s/p fall  Patient was shoveling snow when she slipped and fell  Pt denies hitting her head, but does not know how she landed  No LOC  No blood thinners  Unable to get up on her own  Lives at home with nephew who helped her get up  Pt was seen at [facility] ED and found to have SDH/SAH       ED Vital Signs:   ED Triage Vitals   Temperature Pulse Respirations Blood Pressure SpO2   02/09/18 2103 02/09/18 2103 02/09/18 2103 02/09/18 2103 02/09/18 2103   (!) 97 2 °F (36 2 °C) 83 18 (!) 171/75 92 %      Temp Source Heart Rate Source Patient Position - Orthostatic VS BP Location FiO2 (%)   02/09/18 2103 02/10/18 0500 02/10/18 0000 02/10/18 0000 --   Oral Monitor Lying Left arm       Pain Score       02/10/18 0200       No Pain        Wt Readings from Last 1 Encounters:   02/10/18 81 2 kg (179 lb)       Vital Signs:  02/09 0701  02/10 0700 02/10 0701  02/10 1857  Most Recent     Temperature (°F) 97 2-97 4 97 5-98 1  97 5 (36 4)    Pulse 77-96 77-88  77    Respirations 14-22 18  18    Blood Pressure 115//84 124//62  144/62    SpO2 (%) 92-99 98  98        Abnormal Labs/Diagnostic Test Results: Glucose 164    Imaging/EKG Studies:   CTH: Extra-axial hemorrhage along the left side of the falx cerebri in keeping with a combination of subdural and subarachnoid hemorrhage   The largest focus measures 2 5 x 1 4 x 3 1 cm    There is also left frontal subdural hemorrhage measuring 4 mm in thickness and 3 3 cm in length      CT-Cspine: P  CXR: P  L-spine x-ray: P  B/l hip x-ray: P  R knee x-ray: P    Past Medical/Surgical History: Active Ambulatory Problems     Diagnosis Date Noted    Renal calculus, left 06/17/2016    Hypertension 06/17/2016    Hyperbilirubinemia 06/21/2016    Hyperlipidemia     Amaurosis fugax 11/18/2016    Hearing loss 11/18/2016    Vitamin D deficiency 11/18/2016     Resolved Ambulatory Problems     Diagnosis Date Noted    Vomiting 06/17/2016    Non-traumatic intracranial hemorrhage (HCC) 06/17/2016    Bradycardia with 31-40 beats per minute 06/20/2016    Renal insufficiency 11/07/2016    Fracture of proximal end of humerus 11/18/2016    Hemorrhage into subarachnoid space of neuraxis ([address] ) 11/18/2016    Tinea pedis 11/18/2016     Past Medical History:   Diagnosis Date    Amaurosis fugax 11/18/2016    Arthritis     History of subarachnoid hemorrhage     Hyperlipidemia     Hypertension     Nephrolithiasis        Admitting Diagnosis: Subdural hematoma ([address] ) [I62 00]  Unspecified multiple injuries, initial encounter [T07  XXXA]    Age/Sex: 80 y o  female    Assessment/Plan:   Trauma Active Problems:   Subdural hematoma  Subarachnoid hematoma  Fall from standing     Trauma Plan:   Admit to trauma service under HOT protocol  Frequent neuro checks  Neurosurgery consult  PT/OT/CM    Admission Orders:  Admit to M/S/Tele unit  Tele  Neuro checks q 1h  Up with assistance  SCD's  Repeat CT head  Clear liquid diet  Consult gerontology, neurosurgery  PT/OT eval & tx      Scheduled Meds:   Current Facility-Administered Medications:  acetaminophen 650 mg Oral Q6H PRN Radha Mac, DO   calcium carbonate-vitamin D 1 tablet Oral Daily Masha Bautista, DO   furosemide 20 mg Oral Daily Masha Bautista, DO   ondansetron 4 mg Intravenous Q6H PRN Masha Bautista, DO   potassium chloride 10 mEq Oral Daily Masha Bautista, DO   pravastatin 40 mg Oral Daily With Harinder Bautista, DO   senna 1 tablet Oral BID Masha Bautista, DO     Continuous Infusions:    PRN Meds:   acetaminophen    ondansetron

## 2018-02-10 NOTE — PLAN OF CARE
Problem: PHYSICAL THERAPY ADULT  Goal: Performs mobility at highest level of function for planned discharge setting  See evaluation for individualized goals  See flowsheet documentation for full assessment, interventions and recommendations  Mia Gan, PT    Prognosis: Good  Problem List: Decreased strength, Decreased endurance, Impaired balance, Decreased mobility, Decreased cognition, Impaired judgement, Decreased safety awareness, Pain  Assessment: Pt is 80year old female admitted on 2/9/18 s/p fall while shoveling, sustained L SDH  Pt has Pmhx including but not limited to chronic LBP, chronic LE weakness, SAH, arthritis  Pt lives in a 2 Story house with first floor set-up, [address], with her grandson who works nights and sleeps during the day  Pt reports independence using her quad cane at baseline, mostly independent with ADL's and iADL's except daughter in law assists with sponge baths  Pt does admit to "about 3" falls in the past 6 months, but reports it is ok because her grandson can pick her up  Pt currently presenting well below baseline with decreased strength, decreased balance, decreased cardiopulmonary stamina, increased pain, and decreased safety awareness/insight affecting her mobility  Pt required max A for supine to sit transfer, max A x 2 for partial sit to stand from EOB, and max A x 2 to return to supine position during this session  Pt will benefit from continued skilled PT services while in the hospital to address the above impairments and maximize safety and independence with functional mobility    Recommend STR at d/c   Barriers to Discharge: Inaccessible home environment, Decreased caregiver support (grandson sleeps during the day)  Barriers to Discharge Comments: 2 [address], pt managing independently during the day while grandson slept prior to admission  Recommendation: Post acute IP rehab     PT - OK to Discharge: No (not safe for d/c to home, OK to d/c to rehab)    See flowsheet documentation for full assessment       Lilliana Vigil, PT

## 2018-02-10 NOTE — H&P
H&P Exam - Trauma   Timur George 80 y o  female MRN: [id number]  Unit/Bed#: ED 08 Encounter: [id number]    Assessment/Plan   Trauma Alert: Evaluation  Model of Arrival: Transfer [address] [place] [address]  Trauma Team: Attending Greg Carmona and Residents Nico Tierney  Consultants: NSG    Trauma Active Problems:   Subdural hematoma  Subarachnoid hematoma  Fall from standing    Trauma Plan:   Admit to trauma service under HOT protocol  Frequent neuro checks  Neurosurgery consult  PT/OT/CM    Chief Complaint: N/A    History of Present Illness   HPI:  Timur George is a 80 y o  female who presents s/p fall  Patient was shoveling snow when she slipped and fell  Pt denies hitting her head, but does not know how she landed  No LOC  No blood thinners  Unable to get up on her own  Lives at home with nephew who helped her get up  Pt was seen at [address] [place] [address] ED and found to have SDH/SAH  On arrival to [facility] ED, pt has no complaints  She denies any other recent falls  Ambulates with a cane at baseline  Mechanism:Fall    Review of Systems   Constitutional: Negative for diaphoresis and fatigue  HENT: Negative for rhinorrhea and sore throat  Eyes: Negative for pain and visual disturbance  Respiratory: Negative for cough and shortness of breath  Cardiovascular: Negative for chest pain and leg swelling  Gastrointestinal: Negative for abdominal pain, nausea and vomiting  Genitourinary: Negative for dysuria and urgency  Musculoskeletal: Negative for back pain and neck pain  Skin: Negative for color change and rash  Neurological: Positive for dizziness and light-headedness  Negative for tremors, syncope, facial asymmetry, speech difficulty, weakness, numbness and headaches  Hematological: Negative for adenopathy  Does not bruise/bleed easily  Psychiatric/Behavioral: Negative for agitation and confusion  All other systems reviewed and are negative        Historical Information     Past Medical History: Diagnosis Date    Amaurosis fugax 11/18/2016    Arthritis     History of subarachnoid hemorrhage     Hyperlipidemia     Hypertension     Nephrolithiasis      Past Surgical History:   Procedure Laterality Date    LITHOTRIPSY      MN FRAGMENT KIDNEY STONE/ ESWL Left 1/3/2017    Procedure: LITHROTRIPSY EXTRACORPORAL SHOCKWAVE (ESWL); Surgeon: Mariaa Ivan MD;  Location: [facility];  Service: Urology    MN FRAGMENT KIDNEY STONE/ ESWL Right 11/18/2016    Procedure: Monse Musty SHOCKWAVE (ESWL);   Surgeon: Mariaa Ivan MD;  [facility];  Service: Urology    URETERAL STENT PLACEMENT Bilateral 11/9/2016    Procedure: Juluis Door; STENT INSERTION ;  Surgeon: Mariaa Ivan MD;  Location[facility];  Service:      Social History   History   Alcohol Use No     History   Drug Use No     History   Smoking Status    Never Smoker   Smokeless Tobacco    Never Used     Immunization History   Administered Date(s) Administered    Influenza Split High Dose Preservative Free IM 10/27/2014, 10/30/2015, 11/22/2016, 12/06/2017    Influenza TIV (IM) 10/21/2011, 10/21/2013    Pneumococcal Conjugate 13-Valent 06/06/2017     Last Tetanus: N/A  Family History: Non-contributory    Meds/Allergies   all current active meds have been reviewed    No Known Allergies      PHYSICAL EXAM    Objective   Vitals:   First set: Temperature: (!) 97 2 °F (36 2 °C) (02/09/18 2103)  Pulse: 83 (02/09/18 2103)  Respirations: 18 (02/09/18 2103)  Blood Pressure: (!) 171/75 (02/09/18 2103)    Primary Survey:   (A) Airway: Intact  (B) Breathing: B/l breath sounds  (C) Circulation: Pulses:   carotid  2/4, pedal  1/4, radial  2/4 and femoral  2/4  (D) Disabliity:  GCS Total:  15, Eye Opening:   Spontaneous = 4, Motor Response: Obeys commands = 6 and Verbal Response:  Oriented = 5  (E) Expose:  Completed    Secondary Survey: (Click on Physical Exam tab above)  Physical Exam   Constitutional: She is oriented to person, place, and time  She appears well-developed and well-nourished  No distress  HENT:   Head: Normocephalic and atraumatic  Right Ear: External ear normal    Left Ear: External ear normal    Mouth/Throat: Oropharynx is clear and moist    Eyes: Conjunctivae and EOM are normal  Pupils are equal, round, and reactive to light  Neck: Normal range of motion  Neck supple  Mild posterior neck ttp  No midline T/L spine ttp   Cardiovascular: Normal rate, regular rhythm, normal heart sounds and intact distal pulses  Pulmonary/Chest: Effort normal and breath sounds normal  No respiratory distress  Abdominal: She exhibits no distension  There is no tenderness  Pelvis stable   Musculoskeletal: Normal range of motion  She exhibits no tenderness or deformity  Neurological: She is alert and oriented to person, place, and time  No cranial nerve deficit  She exhibits normal muscle tone  Coordination normal    Strength 5/5 throughout  Sensation grossly intact  Skin: Skin is warm and dry  She is not diaphoretic  Psychiatric: She has a normal mood and affect   Her behavior is normal        Invasive Devices     Peripheral Intravenous Line            Peripheral IV 02/09/18 Left Hand less than 1 day    Peripheral IV 02/09/18 Right Antecubital less than 1 day                Lab Results:     Results from last 7 days  Lab Units 02/09/18  1848   WBC Thousand/uL 8 88   HEMOGLOBIN g/dL 14 5   HEMATOCRIT % 44 1   PLATELETS Thousands/uL 184   NEUTROS PCT % 83*   MONOS PCT % 6       Results from last 7 days  Lab Units 02/09/18  1848   SODIUM mmol/L 140   POTASSIUM mmol/L 3 6   CHLORIDE mmol/L 103   CO2 mmol/L 26   BUN mg/dL 25   CREATININE mg/dL 1 02   CALCIUM mg/dL 9 5   TOTAL PROTEIN g/dL 6 9   BILIRUBIN TOTAL mg/dL 0 70   ALK PHOS U/L 110   ALT U/L 26   AST U/L 22   GLUCOSE RANDOM mg/dL 164*         Lab Results   Component Value Date    PHOS 3 7 06/23/2016    PHOS 4 0 06/21/2016    PHOS 1 9 (L) 06/17/2016        Results from last 7 days  Lab Units 02/09/18  1848   INR  0 91   PTT seconds 27     Imaging/EKG Studies:   CTH: Extra-axial hemorrhage along the left side of the falx cerebri in keeping with a combination of subdural and subarachnoid hemorrhage  The largest focus measures 2 5 x 1 4 x 3 1 cm      There is also left frontal subdural hemorrhage measuring 4 mm in thickness and 3 3 cm in length      CT-Cspine: P  CXR: P  L-spine x-ray: P  B/l hip x-ray: P  R knee x-ray: P    Code Status: Prior  Advance Directive and Living Will:      Power of :    POLST:

## 2018-02-10 NOTE — PHYSICAL THERAPY NOTE
Physical Therapy Evaluation     02/10/18 4124   Note Type   Note type Eval only   Pain Assessment   Pain Assessment 0-10   Pain Score (0/10 at rest, 10/10 with movement)   Pain Type Chronic pain   Pain Location Back   Pain Orientation Lower   Hospital Pain Intervention(s) (repositioned for comfort)   Home Living   Type of Mikayla Jones to live on main level with bedroom/bathroom;Stairs to enter with rails  (1/2 bath on first floor, 2 CHARITO )   Bathroom Shower/Tub (only sponge bathes on 1st floor)   Bathroom Toilet Standard   Home Equipment Walker;Quad cane  (rollator)   Prior Function   Level of [place] Independent with ADLs and functional mobility; Other (Comment)  (independent except for sponge bathing)   Lives With Family  (grandson, works nights, sleeps during the day)   Receives Help From Family   ADL Assistance Needs assistance  (daughter in law assists with sponge bath)   IADLs Independent   Falls in the last 6 months 1 to 4  ("about 3" "my grandson can pick me up")   Comments pt reports she amb with QC at baseline, independent with iADL's and dressing, DIL assists with sponge bathing, does not drive   Restrictions/Precautions   Weight Bearing Precautions Per Order No   Braces or Orthoses (none)   Other Precautions Cognitive; Fall Risk;Pain   General   Family/Caregiver Present No   Cognition   Overall Cognitive Status Impaired   Orientation Level Oriented X4  (but intermittently confused during session)   Memory Decreased short term memory   Following Commands Follows one step commands with increased time or repetition   Comments pt oriented but intermittently confused during session   RUE Assessment   RUE Assessment X  (chronic AROM deficit ~1/2 range at shoulder)   LUE Assessment   LUE Assessment X  (chronic AROM deficit ~1/2 range at shoulder)   RLE Assessment   RLE Assessment X   Strength RLE   RLE Overall Strength 3+/5   LLE Assessment   LLE Assessment X   Strength LLE   LLE Overall Strength 3+/5   Coordination   Movements are Fluid and Coordinated 0   Coordination and Movement Description pt guarded, rigid at times, flinching at times    Bed Mobility   Rolling R 2  Maximal assistance   Additional items Assist x 1;Bedrails   Supine to Sit 2  Maximal assistance   Additional items Assist x 1;Verbal cues; Bedrails   Sit to Supine 2  Maximal assistance   Additional items Assist x 2;Verbal cues   Transfers   Sit to Stand 2  Maximal assistance  (attempted from EOB x 2, able to clear buttocks ~1 inch)   Additional items Assist x 2;Verbal cues   Stand to Sit 2  Maximal assistance   Additional items Assist x 2;Verbal cues   Additional Comments pt able to complete partial sit to stand <10% of complete transfer, cleared buttocks off bed ~1 inch with max A x 2, reports lightheadedness and appears to have increased anxiety during mobility   Ambulation/Elevation   Gait Assistance Not tested   Balance   Static Sitting Fair   Dynamic Sitting Fair -   Static Standing (unable to assess this session)   Endurance Deficit   Endurance Deficit Yes   Endurance Deficit Description pt fatigued with minimal activity   Activity Tolerance   Activity Tolerance Patient limited by fatigue;Patient limited by pain   Nurse Made Aware DAYLIN hoffman, cleared pt for PT session and updated on pt status at end of session   Assessment   Prognosis Good   Problem List Decreased strength;Decreased endurance; Impaired balance;Decreased mobility; Decreased cognition; Impaired judgement;Decreased safety awareness;Pain   Assessment Pt is 80year old female admitted on 2/9/18 s/p fall while shoveling, sustained L SDH  Pt has Pmhx including but not limited to chronic LBP, chronic LE weakness, SAH, arthritis  Pt lives in a 2 Story house with first floor set-up, 2 CHARITO, with her grandson who works nights and sleeps during the day    Pt reports independence using her quad cane at baseline, mostly independent with ADL's and iADL's except daughter in  assists with sponge baths  Pt does admit to "about 3" falls in the past 6 months, but reports it is ok because her grandson can pick her up  Pt currently presenting well below baseline with decreased strength, decreased balance, decreased cardiopulmonary stamina, increased pain, and decreased safety awareness/insight affecting her mobility  Pt required max A for supine to sit transfer, max A x 2 for partial sit to stand from EOB, and max A x 2 to return to supine position during this session  Pt will benefit from continued skilled PT services while in the hospital to address the above impairments and maximize safety and independence with functional mobility  Recommend STR at d/c    Barriers to Discharge Inaccessible home environment;Decreased caregiver support  (brad sleeps during the day)   Barriers to Discharge Comments 2 CHARITO, pt managing independently during the day while brad slept prior to admission   Goals   Patient Goals none stated   STG Expiration Date 02/20/18   Short Term Goal #1 1  Pt will perform all bed mobility activities with min A 2  Pt will improve B LE strength by 1/2 grade 3    Pt will complete all functional transfers with min A using RW    Treatment Day 0   Plan   PT Frequency 5x/wk   Recommendation   Recommendation Post acute IP rehab   PT - OK to Discharge No  (not safe for d/c to home, OK to d/c to rehab)   Barthel Index   Feeding 10   Bathing 0   Grooming Score 0   Dressing Score 5   Bladder Score 0   Bowels Score 0   Toilet Use Score 0   Transfers (Bed/Chair) Score 5   Mobility (Level Surface) Score 0   Stairs Score 0   Barthel Index Score 20     Mia Gan, PT

## 2018-02-10 NOTE — INCIDENTAL FINDINGS
The following findings require follow up:  Radiographic finding   Findin 3 cm left thyroid nodule     Follow up required: with your PCP within 2-4 weeks   Follow up should be done within 2-4 week(s)    Please notify the following clinician to assist with the follow up:   Dr Yvrose Valles

## 2018-02-10 NOTE — ASSESSMENT & PLAN NOTE
- NSG consulted: repeat head CT this AM  - GCS 15  - denies blood thinners   - recommended for inpatient rehab

## 2018-02-11 ENCOUNTER — APPOINTMENT (INPATIENT)
Dept: RADIOLOGY | Facility: HOSPITAL | Age: 83
DRG: 087 | End: 2018-02-11
Payer: MEDICARE

## 2018-02-11 PROCEDURE — 70450 CT HEAD/BRAIN W/O DYE: CPT

## 2018-02-11 PROCEDURE — 97167 OT EVAL HIGH COMPLEX 60 MIN: CPT

## 2018-02-11 PROCEDURE — 99232 SBSQ HOSP IP/OBS MODERATE 35: CPT | Performed by: NURSE PRACTITIONER

## 2018-02-11 PROCEDURE — G8988 SELF CARE GOAL STATUS: HCPCS

## 2018-02-11 PROCEDURE — G8987 SELF CARE CURRENT STATUS: HCPCS

## 2018-02-11 RX ORDER — POLYETHYLENE GLYCOL 3350 17 G/17G
17 POWDER, FOR SOLUTION ORAL DAILY PRN
Status: DISCONTINUED | OUTPATIENT
Start: 2018-02-11 | End: 2018-02-12 | Stop reason: HOSPADM

## 2018-02-11 RX ORDER — LIDOCAINE 50 MG/G
1 PATCH TOPICAL DAILY
Status: DISCONTINUED | OUTPATIENT
Start: 2018-02-11 | End: 2018-02-12 | Stop reason: HOSPADM

## 2018-02-11 RX ORDER — DOCUSATE SODIUM 100 MG/1
100 CAPSULE, LIQUID FILLED ORAL 2 TIMES DAILY
Status: DISCONTINUED | OUTPATIENT
Start: 2018-02-11 | End: 2018-02-12 | Stop reason: HOSPADM

## 2018-02-11 RX ADMIN — PRAVASTATIN SODIUM 40 MG: 40 TABLET ORAL at 16:30

## 2018-02-11 RX ADMIN — FUROSEMIDE 20 MG: 20 TABLET ORAL at 09:19

## 2018-02-11 RX ADMIN — LIDOCAINE 1 PATCH: 50 PATCH TOPICAL at 11:42

## 2018-02-11 RX ADMIN — CALCIUM CARBONATE 500 MG (1,250 MG)-VITAMIN D3 200 UNIT TABLET 1 TABLET: at 09:19

## 2018-02-11 RX ADMIN — DOCUSATE SODIUM 100 MG: 100 CAPSULE, LIQUID FILLED ORAL at 16:30

## 2018-02-11 RX ADMIN — DOCUSATE SODIUM 100 MG: 100 CAPSULE, LIQUID FILLED ORAL at 11:42

## 2018-02-11 RX ADMIN — POTASSIUM CHLORIDE 10 MEQ: 750 TABLET, EXTENDED RELEASE ORAL at 09:19

## 2018-02-11 RX ADMIN — ACETAMINOPHEN 650 MG: 325 TABLET, FILM COATED ORAL at 09:19

## 2018-02-11 RX ADMIN — SENNOSIDES 8.6 MG: 8.6 TABLET, FILM COATED ORAL at 16:30

## 2018-02-11 NOTE — PLAN OF CARE
Problem: OCCUPATIONAL THERAPY ADULT  Goal: Performs self-care activities at highest level of function for planned discharge setting  See evaluation for individualized goals  Treatment Interventions: ADL retraining, Functional transfer training, UE strengthening/ROM, Endurance training, Cognitive reorientation, Patient/family training, Energy conservation, Activityengagement, Continued evaluation, Equipment evaluation/education  Equipment Recommended: Bedside commode       See flowsheet documentation for full assessment, interventions and recommendations  Limitation: Decreased ADL status, Decreased UE ROM, Decreased UE strength, Decreased cognition, Decreased Safe judgement during ADL, Decreased endurance, Decreased self-care trans, Decreased high-level ADLs  Prognosis: Good  Assessment: Pt is a 80 y o  female who was admitted [facility] on 2/9/18 s/p a fall  Pt reports she was shoveling snow when she slipped and fell  And was on the ground for ~30 min  Pt's dx include: subdural hematoma, lumbar disc disease, left parafalcine frontal intraparenchymal and left parafalcine SDH  Pt PMH includes: chronic LE weakness, amaurosis fugax, arthritis, hx of subarachnoid hemorrhage, HLD, HTN, nephrolithiasis, and B/L ureteral stent placement on 11/16  At baseline pt was completing toileting, dressing, and bed mobility independently, performed functional mobility with quad cane, and required Min A for sponge bathing  Pt reports she is independent in IADLs but is no longer driving  Pt lives with grandson in 2 story home with 2 CHARITO, 1st floor set up  Pt reports brad, Jese Hodge, sleeps during the day and works at night  Pt reports her daughter in law occasionally assists with sponge bathing  Pt currently presents with decreased ADL status, fall risk, c/o of pain, and HOT protocol  Currently pt performs supine to sit with Max A , rolling L/R and returning to supine required Max Ax2, and ADLs within room with Max A  Pt currently demonstrates impairments in the following areas: decreased activity tolerance/endurance, decreased strength, impaired sitting balance, decreased safety awareness/judgement, limited AROM of B/L UE (R>L), impaired ROM of RLE, decreased ADL status  These impairments, as well as pt's pain,fall risk, and limited home support limit pt's ability to safely engage in all baseline areas of occupation, including grooming, bathing, dressing, toileting, functional mobility/transfers,  Caring for others, community mobility, driving, house maintenance, medication management,  Rest/sleep,  leisure, social participation  Anticipate pt will continue to progress with continued participation in ADLs, maintaining safety, managing pain, and as she medically progresses  Recommend short term rehab, when medically stable, to maximize independence, performance and safety in ADLs in order to return to previous living environment  From OT perspective, pt will continue to benefit from continued OT services to address the following goals 3-5x/wk to  w/in 7-10 days        OT Discharge Recommendation: Short Term Rehab  OT - OK to Discharge: Yes (To short term rehab when medically stable )   Dyan Pablo MS , OTR/L

## 2018-02-11 NOTE — OCCUPATIONAL THERAPY NOTE
[address]  Evaluation     Patient Name: Jessica Tay  [id number] Date: 2/11/2018  Problem List  Patient Active Problem List   Diagnosis    Renal calculus, left    Hypertension    Hyperbilirubinemia    Hyperlipidemia    Amaurosis fugax    Hearing loss    Vitamin D deficiency    Subdural hematoma (HCC)    Lumbar disc disease    Fall     Past Medical History  Past Medical History:   Diagnosis Date    Amaurosis fugax 11/18/2016    Arthritis     History of subarachnoid hemorrhage     Hyperlipidemia     Hypertension     Nephrolithiasis      Past Surgical History  Past Surgical History:   Procedure Laterality Date    LITHOTRIPSY      RI FRAGMENT KIDNEY STONE/ ESWL Left 1/3/2017    Procedure: LITHROTRIPSY EXTRACORPORAL SHOCKWAVE (ESWL); Surgeon: Francis Jordan MD;  [facility];  Service: Urology    RI FRAGMENT KIDNEY STONE/ ESWL Right 11/18/2016    Procedure: Doris Pilling SHOCKWAVE (ESWL); Surgeon: Francis Jordan MD;  [facility];  Service: Urology    URETERAL STENT PLACEMENT Bilateral 11/9/2016    Procedure: [place]; STENT INSERTION ;  Surgeon: Francis Jordan MD;  [facility];  Service:          02/11/18 1540   Note Type   Note type Eval only   Restrictions/Precautions   Weight Bearing Precautions Per Order No   Other Precautions Cognitive; Bed Alarm; Fall Risk;Pain   Pain Assessment   Pain Assessment 0-10   Pain Score 5   Pain Type Chronic pain   Pain Location Back   Pain Orientation Lower   Pain Frequency Constant/continuous   Pain Onset Gradual   Patient's Stated Pain Goal No pain   Hospital Pain Intervention(s) Ambulation/increased activity;Repositioned;Relaxation technique; Rest;Emotional support   Home Living   Type of [address] [place] [address] Two level; Able to live on main level with bedroom/bathroom;Stairs to enter with rails;1/2 bath on main level  (2 CHARITO )   Bathroom Shower/Tub Tub/shower unit  (Pt reports she does not use tub, sponge bathes on 1st floor )   [address];Quad cane   Additional Comments Pt lives in a 2 story home with grandson 2 [address], pt reports 1st floor setup  Prior Function   Level of [place] Independent with ADLs and functional mobility  (Pt reports she requires assistance for sponge bathing )   Lives With Family  (Grandson whom works nights and sleeps during the day )   Receives Help From Family   ADL Assistance Needs assistance  (Pt reports daughter assists with sponge bathing only )   IADLs Independent   Falls in the last 6 months 1 to 4  (~3, pt requires assistance from grandson to stand from floor)   Vocational Retired   Comments Pt reports she independent in all areas occupations except for sponge bathing  Pt reports she is not longer driving  Lifestyle   Autonomy At baseline, pt independent in dressing, toileting, bed mobility and grooming and required assistance for sponge bathing  Pt reports she was independent in IADLs and is no longer driving  Reciprocal Relationships Pt lives with grandson and receives assistance from daughter in law    Intrinsic Gratification Pt enjoys completing puzzels    Psychosocial   Patient Behaviors/Mood Crying; Cooperative   ADL   Where Assessed Edge of bed   Eating Assistance 5  Supervision/Setup   Grooming Assistance 3  Moderate Assistance   UB Bathing Assistance 2  Maximal Assistance   LB Bathing Assistance 2  Maximal Assistance   UB Dressing Assistance 2  Maximal Assistance   LB Dressing Assistance 2  Maximal Assistance   Toileting Assistance  1  Total Assistance   Functional Assistance 2  Maximal Assistance   Additional Comments Pt requires Max A for ADLs due to decreased activity tolerance/endruance, impaired sitting balance, decreased  strength, limited B/L UE ROM, and safety  Bed Mobility   Rolling R 2  Maximal assistance  (x2)   Additional items Assist x 1; Increased time required;Verbal cues; Bedrails   Rolling L 2  Maximal assistance   Additional items Assist x 1; Increased time required;Verbal cues   Supine to Sit 2  Maximal assistance   Additional items Assist x 1;Bedrails; Increased time required;Verbal cues;LE management   Sit to Supine 2  Maximal assistance   Additional items Assist x 1; Increased time required; Bedrails;Verbal cues;LE management   Additional Comments Pt requires Max A for bed mobility for LE management, decreased trunk control/postural stability, impaired sitting balance, decreased activity tolerance/endurance, and deconditioning  Transfers   Sit to Stand Unable to assess   Additional Comments Unable to assess due to need for 2nd assist and decreased LE strength, impaired trunk control, and decreased activity tolerance/endurance  OTR to assess functional transfers when able  Functional Mobility   Additional Comments Unable to assess functional mobility at this time, will continue to assess    Balance   Static Sitting Fair -   Dynamic Sitting Poor +   Static Standing (Unable to assess )   Dynamic Standing (Unable to assess )   Activity Tolerance   Activity Tolerance Patient limited by fatigue;Patient limited by pain   Nurse Made Aware Pt appropriate to see per RN Danial Dominguez, DAYLIN Roldan updated on pt status at end of session    RUE Assessment   RUE Assessment X  (Impaired AROM )   LUE Assessment   LUE Assessment X  (Impaired AROM )   Hand Function   Gross Motor Coordination Functional   Fine Motor Coordination Functional   Vision-Basic Assessment   Current Vision Wears glasses all the time   Vision - Complex Assessment   Acuity Able to read employee name badge without difficulty   Cognition   Overall Cognitive Status Impaired   Arousal/Participation Cooperative;Arousable;Lethargic   Attention Attends with cues to redirect   Orientation Level Oriented X4   Memory Decreased short term memory   Following Commands Follows one step commands with increased time or repetition   Comments Pt s/p fall on ice with + LOC   Pt lethargic but cooperative and Ox4  Pt able to follow one step commands with increased time/repetition  Pt presented with intermitent confusion during therapy  Pt able to recall events leading to hospital     Assessment   Limitation Decreased ADL status; Decreased UE ROM; Decreased UE strength;Decreased cognition;Decreased Safe judgement during ADL;Decreased endurance;Decreased self-care trans;Decreased high-level ADLs   Prognosis Good   Assessment Pt is a 80 y o  female who was [facility] on [facility] s/p a fall  Pt reports she was shoveling snow when she slipped and fell  And was on the ground for ~30 min  Pt's dx include: subdural hematoma, lumbar disc disease, left parafalcine frontal intraparenchymal and left parafalcine SDH  Pt PMH includes: chronic LE weakness, amaurosis fugax, arthritis, hx of subarachnoid hemorrhage, HLD, HTN, nephrolithiasis, and B/L ureteral stent placement on 11/16  At baseline pt was completing toileting, dressing, and bed mobility independently, performed functional mobility with quad cane, and required Min A for sponge bathing  Pt reports she is independent in IADLs but is no longer driving  Pt lives with brad in 2 story home with 2 CHARITO, 1st floor set up  Pt reports brad, Miriam Rod, sleeps during the day and works at night  Pt reports her daughter in law occasionally assists with sponge bathing  Pt currently presents with decreased ADL status, fall risk, c/o of pain, and HOT protocol  Currently pt performs supine to sit with Max A , rolling L/R and returning to supine required Max Ax2, and ADLs within room with Max A  Pt currently demonstrates impairments in the following areas: decreased activity tolerance/endurance, decreased strength, impaired sitting balance, decreased safety awareness/judgement, limited AROM of B/L UE (R>L), impaired ROM of RLE, decreased ADL status   These impairments, as well as pt's pain,fall risk, and limited home support limit pt's ability to safely engage in all baseline areas of occupation, including grooming, bathing, dressing, toileting, functional mobility/transfers,  Caring for others, community mobility, driving, house maintenance, medication management,  Rest/sleep,  leisure, social participation  Anticipate pt will continue to progress with continued participation in ADLs, maintaining safety, managing pain, and as she medically progresses  Recommend short term rehab, when medically stable, to maximize independence, performance and safety in ADLs in order to return to previous living environment  From OT perspective, pt will continue to benefit from continued OT services to address the following goals 3-5x/wk to  w/in 7-10 days  Goals   Patient Goals To feel better    Plan   Treatment Interventions ADL retraining;Functional transfer training;UE strengthening/ROM; Endurance training;Cognitive reorientation;Patient/family training;Energy conservation; Activityengagement;Continued evaluation;Equipment evaluation/education   Goal Expiration Date 18   OT Frequency 3-5x/wk   Recommendation   OT Discharge Recommendation Short Term Rehab   Equipment Recommended Bedside commode   OT - OK to Discharge Yes  (To short term rehab when medically stable )   Barthel Index   Feeding 10   Bathing 0   Grooming Score 0   Dressing Score 0   Bladder Score 0   Bowels Score 0   Toilet Use Score 0   Transfers (Bed/Chair) Score 5   Mobility (Level Surface) Score 0   Stairs Score 0   Barthel Index Score 15   Modified McLean Scale   Modified Evelin Scale 4     Goals: 1  Pt will complete UB/LB ADLS with Mod I  2  Pt will complete toileting, including thorough hygiene,with Mod I and use of DME PRN  3  Pt will complete bed mobility and transfer to EOB with Mod I to increase bed mobility in preparation for EOB activities  4  Pt will complete functional transfers on/off all surfaces, including toilet/commode, with Mod I to increase participation in ADLS/IADLS  5  Pt will complete light grooming task while standing at sink for 3-5 minutes with Mod I and good balance, DME PRN  6  Pt will increase activity tolerance to 30 minutes during OT tx session to increase endurance during all functional tasks  7  Pt will increase standing balance to good for 15 min while engaging in ADLS  8  Pt will complete simulated IADLs with supervision and less than 2 cues for safety  9  Pt will complete ADLs/IADLs while following 100% of 2-3 step commands  10   Pt will be attentive 100% of the time during depression screening/leisure checklist to promote psychosocial wellbeing      Tawnya Grover MS , OTR/L

## 2018-02-11 NOTE — PROGRESS NOTES
Progress Note - Neurosurgery   Ana Cristina Parham 80 y o  female MRN: [id number]  Unit/Bed#: [facility] 910-01 Encounter: [id number]    Assessment:  1  Mechanical fall  2  Left parafalcine frontal intraparenchymal and left parafalcine SDH  3  History of prior SDH      Plan:  - follow up CT stable form prior  - outpatient neurosurgery follow up in 2 weeks with Ct prior  - No AC/AP for 2 weeks minimum    Subjective/Objective     Complains of not being able to walk and being dizzy when she gets up ( was present prior to fall)      Intake/Output       02/11/18 0701 - 02/12/18 0700      [id number] Total       Intake    P O   360  -- 360    Total Intake 360 -- 360       Output    Urine  580  -- 580    Urine 150 -- 150    Weighted Urine Output (mL) 430 -- 430    Total Output 580 -- 580       Net I/O     -220 -- -220          Invasive Devices     Peripheral Intravenous Line            Peripheral IV 02/09/18 Left Hand 1 day    Peripheral IV 02/09/18 Right Antecubital 1 day                Physical Exam:     Vitals: Blood pressure 129/77, pulse 86, temperature 97 7 °F (36 5 °C), temperature source Oral, resp  rate 18, height 5' 2" (1 575 m), weight 81 2 kg (179 lb), SpO2 96 %  ,Body mass index is 32 74 kg/m²  Awake and alert  Moves extremities  Chronically weak RLE  Facial features symmetrical  Tongue midline  PERRLA  Lungs clear   Heart regular  Abdomen soft      Lab Results: I have personally reviewed pertinent results  Imaging Studies: I have personally reviewed pertinent films in PACS and with Dr Alexandru Camp:     Evolving hematoma and small volume of subarachnoid hemorrhage, not significantly changed in size from prior examination  Slight decrease in size of subdural hematoma  No new areas of intracranial hemorrhage  No hydrocephalus           VTE Pharmacologic Prophylaxis: Reason for no pharmacologic prophylaxis SAH    VTE Mechanical Prophylaxis: sequential compression device

## 2018-02-12 VITALS
TEMPERATURE: 97.6 F | BODY MASS INDEX: 32.94 KG/M2 | RESPIRATION RATE: 18 BRPM | DIASTOLIC BLOOD PRESSURE: 65 MMHG | WEIGHT: 179 LBS | OXYGEN SATURATION: 96 % | HEART RATE: 74 BPM | SYSTOLIC BLOOD PRESSURE: 132 MMHG | HEIGHT: 62 IN

## 2018-02-12 PROBLEM — S06.5X9A SUBDURAL HEMATOMA (HCC): Status: RESOLVED | Noted: 2018-02-09 | Resolved: 2018-02-12

## 2018-02-12 LAB
ATRIAL RATE: 84 BPM
P AXIS: 54 DEGREES
PR INTERVAL: 166 MS
QRS AXIS: -34 DEGREES
QRSD INTERVAL: 72 MS
QT INTERVAL: 366 MS
QTC INTERVAL: 423 MS
T WAVE AXIS: 34 DEGREES
VENTRICULAR RATE: 80 BPM

## 2018-02-12 PROCEDURE — 99238 HOSP IP/OBS DSCHRG MGMT 30/<: CPT | Performed by: SURGERY

## 2018-02-12 RX ORDER — ACETAMINOPHEN 325 MG/1
650 TABLET ORAL EVERY 8 HOURS SCHEDULED
Status: DISCONTINUED | OUTPATIENT
Start: 2018-02-12 | End: 2018-02-12 | Stop reason: HOSPADM

## 2018-02-12 RX ADMIN — LIDOCAINE 1 PATCH: 50 PATCH TOPICAL at 10:55

## 2018-02-12 RX ADMIN — POTASSIUM CHLORIDE 10 MEQ: 750 TABLET, EXTENDED RELEASE ORAL at 10:58

## 2018-02-12 RX ADMIN — CALCIUM CARBONATE 500 MG (1,250 MG)-VITAMIN D3 200 UNIT TABLET 1 TABLET: at 10:58

## 2018-02-12 RX ADMIN — SENNOSIDES 8.6 MG: 8.6 TABLET, FILM COATED ORAL at 10:58

## 2018-02-12 RX ADMIN — FUROSEMIDE 20 MG: 20 TABLET ORAL at 10:57

## 2018-02-12 RX ADMIN — DOCUSATE SODIUM 100 MG: 100 CAPSULE, LIQUID FILLED ORAL at 10:58

## 2018-02-12 NOTE — DISCHARGE INSTRUCTIONS
Subdural Hematoma   WHAT YOU NEED TO KNOW:   A subdural hematoma is a condition that develops when blood collects under the dura (protective covering of the brain)  As the blood collects between the dura and the brain, the brain compresses  The compression can lead to serious medical problems including seizure, coma, and death  WHILE YOU ARE HERE:   Informed consent  is a legal document that explains the tests, treatments, or procedures that you may need  Informed consent means you understand what will be done and can make decisions about what you want  You give your permission when you sign the consent form  You can have someone sign this form for you if you are not able to sign it  You have the right to understand your medical care in words you know  Before you sign the consent form, understand the risks and benefits of what will be done  Make sure all your questions are answered  Activity:  You may need to walk around the same day of surgery, or the day after  Movement will help prevent blood clots  You may also be given exercises to do in bed  Do not get out of bed on your own until your caregiver says you can  Talk to caregivers before you get up the first time  They may need to help you stand up safely  When you are able to get up on your own, sit or lie down right away if you feel weak or dizzy  Then press the call light button to let caregivers know you need help  Monitoring:   · ICP monitor  keeps an ongoing measurement of the pressure inside your skull (the bones of your head)  ICP stands for intracranial pressure  An ICP monitor is a small tube that is put through the skull and into the head  The tubing is connected to a TV-type screen  · Neurological exams  show healthcare providers how well your brain works after an injury or illness  This is also called neuro signs, neuro checks, or neuro status   Healthcare providers will check how your pupils (black dots in the center of each eye) react to light  They may check your memory and how easily you wake up  Your hand grasp and balance may also be tested  You may be given a neurological exam every hour  Medicines:   · Diuretics  are given to help decrease swelling in your brain  This may help your brain get better blood flow  · Anticonvulsants  may be given to prevent and control seizures  Tests:  CT or MRI pictures of your brain may show blood in the dura  Do not enter the MRI room with anything metal  Metal can cause serious injury  Tell the healthcare provider if you have any metal in or on your body  Surgery  is the only treatment that can remove the blood from under the dura  RISKS:   You may have seizures if your brain continues to swell  Even with treatment, a subdural hematoma may return  If left untreated, a subdural hematoma may cause brain damage, numbness, permanent body weakness, coma, and even death  CARE AGREEMENT:   You have the right to help plan your care  Learn about your health condition and how it may be treated  Discuss treatment options with your caregivers to decide what care you want to receive  You always have the right to refuse treatment  © 2017 [facility] Information is for End User's use only and may not be sold, redistributed or otherwise used for commercial purposes  All illustrations and images included in CareNotes® are the copyrighted property of A D A M [employer] Inc  or Jose Varma  The above information is an  only  It is not intended as medical advice for individual conditions or treatments  Talk to your doctor, nurse or pharmacist before following any medical regimen to see if it is safe and effective for you  Neurosurgery Discharge Recommendations  Do not take any blood thinning medications (ie  No Advil  No motrin  No ibuprofen  No Aleve  No Aspirin  No Plavix  No fishoil  No heparin   No antiplatelet / no anticoagulation medication such as Plavix, Xarelto, Eliquis, etc)  Refrain from activity that increases chance of trauma to head or falls  Recommend you take fall precaution  No strenuous activity or sports  Return to hospital Emergency Room if you experience worsening / new headache, nausea/vomiting, speech/vision change, seizure, confusion / mental status change, weakness, or other neurological changes

## 2018-02-12 NOTE — PLAN OF CARE

## 2018-02-12 NOTE — ASSESSMENT & PLAN NOTE
- history of 2 "ruptured discs" in lumbar spine per patient, chronic  Denies new pain  XR without new traumatic injury   Lidoderm and tylenol for mild pain  - PT/OT - recommended for short term rehab

## 2018-02-12 NOTE — ASSESSMENT & PLAN NOTE
Mechanical fall  - ECG without ischemic changes  - trop 0 02  - no arrhythmias on telemetry  - pt admits to chronic lightheadedness/dizziness which is intermittent and has been on going for at least a year, check orthostatics  - PT/OT - recommended for rehab, pt working with CM to choose a facility

## 2018-02-12 NOTE — PROGRESS NOTES
Progress Note - Nany Ibarra 6/5/1930, 80 y o  female MRN: [id number]    Unit/Bed#: [facility] 910-01 Encounter: [id number]    Primary Care Provider: Lisa iDnh MD   Date and time admitted to hospital: 2/9/2018  8:58 PM        Fall   Assessment & Plan    Mechanical fall  - ECG without ischemic changes  - trop 0 02  - no arrhythmias on telemetry  - pt admits to chronic lightheadedness/dizziness which is intermittent and has been on going for at least a year, check orthostatics  - PT/OT - recommended for rehab, pt working with CM to choose a facility         Lumbar disc disease   Assessment & Plan    - history of 2 "ruptured discs" in lumbar spine per patient, chronic  Denies new pain  XR without new traumatic injury  Lidoderm and tylenol for mild pain  - PT/OT - recommended for short term rehab         * Subdural hematoma ([facility] [address] )   Assessment & Plan    - NSG consulted  - GCS 15  - denies blood thinners   - recommended for inpatient rehab               Subjective/Objective   Chief Complaint: Low back pain    Subjective: Pt c/o chronic low back pain, unchanged from fall  Denies any HA, weakness, numbness  Tolerating diet  PT/OT recommend rehab and pt is agreeable to this    Objective:    Meds/Allergies   Prescriptions Prior to Admission   Medication Sig Dispense Refill Last Dose    acetaminophen (TYLENOL) 325 mg tablet Take 2 tablets by mouth every 6 (six) hours as needed for mild pain 30 tablet 0 Past Month at Unknown time    calcium-vitamin D (OSCAL 500 + D) 500 mg-200 units per tablet Take 1 tablet by mouth daily  1000 iu   Past Week at Unknown time    furosemide (LASIX) 20 mg tablet Take 20 mg by mouth daily  1/2/2017 at Unknown time    Multiple Vitamin (MULTIVITAMIN) tablet Take 1 tablet by mouth daily  Past Week at Unknown time    polyethylene glycol (MIRALAX) 17 g packet Take 17 g by mouth daily for 7 days 119 g 0 11/11/2016    potassium chloride (K-DUR) 10 mEq tablet Take 10 mEq by mouth daily  Past Week at Unknown time    senna (SENOKOT) 8 6 mg Take 1 tablet by mouth 2 (two) times a day for 7 days (Patient taking differently: Take 1 tablet by mouth daily  ) 14 tablet 0 11/11/2016    simvastatin (ZOCOR) 40 mg tablet Take 40 mg by mouth daily at bedtime  1/2/2017 at Unknown time       Vitals: Blood pressure 119/64, pulse 74, temperature 98 4 °F (36 9 °C), temperature source Oral, resp  rate 16, height 5' 2" (1 575 m), weight 81 2 kg (179 lb), SpO2 95 %  Body mass index is 32 74 kg/m²  Intake/Output Summary (Last 24 hours) at 02/12/18 0741  Last data filed at 02/12/18 7410   Gross per 24 hour   Intake              960 ml   Output             1155 ml   Net             -195 ml       Invasive Devices     Peripheral Intravenous Line            Peripheral IV 02/09/18 Left Hand 2 days    Peripheral IV 02/09/18 Right Antecubital 2 days                Nutrition/GI Proph/Bowel Reg: Regular diet    Physical Exam:   GEN: Vitals stable, no acute distress, appears comfortable  HEENT: Head is normocephalic/atraumatic, EOMI, PERRL  CV: heart regular rate and rhythm, no murmurs/rubs/gallops, no chest wall ttp  PULM: Lungs CTA b/l, no wheezes/rales/rhonchi, no cough present  ABD: soft, nontender, nondistended  NEURO: GCS 15, no focal neuro deficits, strength 5/5 all extremities, sensation grossly intact  EXT: skin warm/dry, no peripheral edema  PSYCH: normal affect/personal interaction    Lab Results: No new labs    Imaging/EKG Studies:   2/11 CTH: Evolving hematoma and small volume of subarachnoid hemorrhage, not significantly changed in size from prior examination  Slight decrease in size of subdural hematoma  No new areas of intracranial hemorrhage  No hydrocephalus      VTE Prophylaxis: SCDs

## 2018-02-12 NOTE — DISCHARGE SUMMARY
Discharge Summary - Daryl Gutierrez 80 y o  female MRN: [id number]    Unit/Bed#: [facility] 910-01 Encounter: [id number]    Admission Date: 2/9/2018     Admitting Diagnosis: Subdural hematoma ([address] ) [I62 00]  Unspecified multiple injuries, initial encounter [T07  XXXA]    HPI: Daryl Gutierrez is a 80 y o  female who presents s/p fall  Patient was shoveling snow when she slipped and fell  Pt denies hitting her head, but does not know how she landed  No LOC  No blood thinners  Unable to get up on her own  Lives at home with nephew who helped her get up  Pt was seen at [address] [place] [address] ED and found to have SDH/SAH  On arrival to [facility] ED, pt has no complaints  She denies any other recent falls  Ambulates with a cane at baseline       Mechanism:Fall     Procedures Performed: No orders of the defined types were placed in this encounter  Hospital Course:  Patient is an 80-year-old female presents status post fall  She was initially evaluated by trauma team who noted a subdural hematoma  Patient was consulted Neurosurgery  Recommended repeat head CT which was stable  Patient did well on the trauma floor  She was unable to pass PT and OT was recommended for rehab facility  On discharge she was given follow-up with the Neurosurgery team in 2 weeks  She was also recommended follow-up with her PCP in 2 weeks  Significant Findings, Care, Treatment and Services Provided:   CXr Chest 2 Views    Result Date: 2/10/2018  Impression: No acute cardiopulmonary disease, stable  Findings are consistent with emergency room physician's preliminary reading   Workstation performed: [id number]     Xr Lumbar Spine 2 Or 3 Views    Result Date: 2/10/2018  Impression: No acute injury confirmed Persistent mild levoscoliosis Progressive multilevel degenerative spondylosis Findings are consistent with emergency room physician's preliminary reading  Workstation performed: [id number]     Xr Hips Bilateral With Ap Pelvis 2 Vw    Result Date: 2/10/2018  Impression: Development of mild degenerative arthritis both hips No acute osseous injury confirmed Findings are consistent with emergency room physician's preliminary reading Workstation performed: [id number]     Xr Knee 4+ Views Right Injury    Result Date: 2/10/2018  Impression: Degenerative arthritis, osteopenia No acute osseous abnormality  Findings are consistent with emergency room physician's preliminary reading Workstation performed: [id number]     Ct Head Wo Contrast    Result Date: 2/11/2018  Impression: Evolving hematoma and small volume of subarachnoid hemorrhage, not significantly changed in size from prior examination  Slight decrease in size of subdural hematoma  No new areas of intracranial hemorrhage  No hydrocephalus  Workstation performed: [id number]     Ct Head Without Contrast    Result Date: 2/10/2018  Impression: No significant interval change of left parafalcine frontal intraparenchymal, left parafalcine in vertex subdural hemorrhage and parafalcine left greater than right subarachnoid hemorrhage  Left posterior parietal-occipital subdural hemorrhage attributed to dependent redistribution from prior subdural hemorrhage  Workstation performed: [id number]     Ct Head Without Contrast    Result Date: 2/9/2018  Impression: Extra-axial hemorrhage along the left side of the falx cerebri in keeping with a combination of subdural and subarachnoid hemorrhage  The largest focus measures 2 5 x 1 4 x 3 1 cm  There is also left frontal subdural hemorrhage measuring 4 mm in thickness and 3 3 cm in length  I personally discussed this study with Dr Carmen Chester on 2/9/2018 at 6:26 PM  Workstation performed: [id number]     Ct Spine Cervical Wo Contrast    Result Date: 2/9/2018  Impression: No cervical spine fracture or traumatic malalignment  2 3 cm left thyroid nodule   Incidental discovery of one or more thyroid nodule(s) measuring more than 1 5 cm and without suspicious features is noted in this patient who is above 28years old; according to guidelines published in the February 2015 white paper on incidental thyroid nodules in the Journal of the [employer] [place]), further characterization with thyroid ultrasound is recommended  The study was marked in EPIC for significant notification  Workstation performed: [id number]       Complications: no complications    Discharge Diagnosis:   Patient Active Problem List   Diagnosis    Renal calculus, left    Hypertension    Hyperbilirubinemia    Hyperlipidemia    Amaurosis fugax    Hearing loss    Vitamin D deficiency    Subdural hematoma (HCC)    Lumbar disc disease    Fall       Condition at Discharge: good     Discharge instructions/Information to patient and family:   See after visit summary for information provided to patient and family  Provisions for Follow-Up Care:  See after visit summary for information related to follow-up care and any pertinent home health orders  Disposition: [facility]    Planned Readmission: No    Discharge Statement   I spent 29 minutes discharging the patient  This time was spent on the day of discharge  I had direct contact with the patient on the day of discharge  Additional documentation is required if more than 30 minutes were spent on discharge  Discharge Medications:  See after visit summary for reconciled discharge medications provided to patient and family

## 2018-02-12 NOTE — MEDICAL STUDENT
Progress Note - Trauma   Frankey Maul 80 y o  female MRN: [id number]  Unit/Bed#: [facility] 910-01 Encounter: [id number]    Assessment: Patient is an 80 yoF s/p fall resulting in a SAH/SDH  She was on no thinnners  She stated that she was experiencing dizziness and lightheadedness before falling  Plan:   1  Subdural Hematoma   -neurosurgery following- non-operative management   -repeat CT head done 2/11-showing decreased size of hematoma and no new areas of intracranial hemorrhage   -f/u with neurosurgery in 2 weeks to repeat CT head    2  Fall   -continue with PT/OT, will be discharged to [facility] today where she will continue    -attempted to obtain orthostatic blood pressures yesterday and today, patient continues to refuse   -f/u with PCP of lightheadedness and dizziness persists      3  Chronic lower back pain   -Hx of lower back fracture per patient, continues to deny new pain   -Lidoderm patches    -continue with PT/OT    4  Constipation   -scheduled colace and senna as well as prn miralax   -Encouraged patient to get OOB as movement will assist with bowel movement as well   -Encourage adequate PO fluid intake    Chief Complaint: "Everyone is moving slow around here"      Subjective: Patient is sitting in bed at this time, she denies any new pain but states that she continues to have pain in her lower back, which is chronic  She continues to report dizziness and feelings of lightheadedness when she moves her head  She continues to refuse to get OOB stating that it hurts too much  Objective:     Meds/Allergies   Prescriptions Prior to Admission   Medication Sig Dispense Refill Last Dose    acetaminophen (TYLENOL) 325 mg tablet Take 2 tablets by mouth every 6 (six) hours as needed for mild pain 30 tablet 0 Past Month at Unknown time    calcium-vitamin D (OSCAL 500 + D) 500 mg-200 units per tablet Take 1 tablet by mouth daily   1000 iu   Past Week at Unknown time    furosemide (LASIX) 20 mg tablet Take 20 mg by mouth daily  1/2/2017 at Unknown time    Multiple Vitamin (MULTIVITAMIN) tablet Take 1 tablet by mouth daily  Past Week at Unknown time    polyethylene glycol (MIRALAX) 17 g packet Take 17 g by mouth daily for 7 days 119 g 0 11/11/2016    potassium chloride (K-DUR) 10 mEq tablet Take 10 mEq by mouth daily  Past Week at Unknown time    senna (SENOKOT) 8 6 mg Take 1 tablet by mouth 2 (two) times a day for 7 days (Patient taking differently: Take 1 tablet by mouth daily  ) 14 tablet 0 11/11/2016    simvastatin (ZOCOR) 40 mg tablet Take 40 mg by mouth daily at bedtime  1/2/2017 at Unknown time       Vitals: Blood pressure 119/64, pulse 74, temperature 98 4 °F (36 9 °C), temperature source Oral, resp  rate 16, height 5' 2" (1 575 m), weight 81 2 kg (179 lb), SpO2 95 %  Body mass index is 32 74 kg/m²  SpO2: SpO2: 95 %    ABG: No results found for: PHART, IWC0GDT, PO2ART, RIS4KJV, S9XBMAHB, BEART, SOURCE      Intake/Output Summary (Last 24 hours) at 02/12/18 0956  Last data filed at 02/12/18 0900   Gross per 24 hour   Intake              960 ml   Output             1208 ml   Net             -248 ml       Invasive Devices     Peripheral Intravenous Line            Peripheral IV 02/09/18 Left Hand 2 days    Peripheral IV 02/09/18 Right Antecubital 2 days                Nutrition/GI Proph/Bowel Reg: Regular diet, colace and senna as well as prn miralax    Physical Exam:   GENERAL APPEARANCE: Well-groomed and well nourished, AAOx3, pleasant and cooperative with assessment  HEENT: Normocephalic and atraumatic, PERRLA, +EOM, trachea midline and no lymphadenopathy noted  CV: S1S2, RRR, no extra heart sounds  Central pulses 2+, radial and brachial pulses 2+ and dorsalis pedis 1+  LUNGS: Clear to ausculation in the upper lobes bilaterally, diminished in the bases   ABD: soft, non-tender, and non-distended   Normoactive bowel sounds x4  EXT: Strengths 4/5 bilateral upper extremities, 3/5 bilateral lower extremities  NEURO: CN 2-12 grossly intact, no focal deficits noted  SKIN: warm and dry, no open areas noted on exam      Lab Results: BMP/CMP: No results found for: NA, K, CL, CO2, ANIONGAP, BUN, CREATININE, GLUCOSE, CALCIUM, AST, ALT, ALKPHOS, PROT, ALBUMIN, BILITOT, EGFR, CBC: No results found for: WBC, HGB, HCT, MCV, PLT, ADJUSTEDWBC, MCH, MCHC, RDW, MPV, NRBC and Coagulation: No results found for: PT, INR, APTT  Imaging/EKG Studies:   Other Studies:   VTE Prophylaxis: on hold at this time secondary to SDH

## 2018-02-13 ENCOUNTER — TRANSITIONAL CARE MANAGEMENT (OUTPATIENT)
Dept: FAMILY MEDICINE CLINIC | Facility: CLINIC | Age: 83
End: 2018-02-13

## 2018-02-22 ENCOUNTER — HOSPITAL ENCOUNTER (OUTPATIENT)
Dept: CT IMAGING | Facility: HOSPITAL | Age: 83
Discharge: HOME | End: 2018-02-22
Payer: MEDICARE

## 2018-02-22 DIAGNOSIS — S06.5X9A SUBDURAL HEMATOMA (HCC): ICD-10-CM

## 2018-02-22 DIAGNOSIS — S06.2X0A CONTUSION OF BRAIN WITHOUT LOSS OF CONSCIOUSNESS, INITIAL ENCOUNTER (HCC): ICD-10-CM

## 2018-02-22 PROCEDURE — 70450 CT HEAD/BRAIN W/O DYE: CPT

## 2018-02-26 ENCOUNTER — OFFICE VISIT (OUTPATIENT)
Dept: NEUROSURGERY | Facility: CLINIC | Age: 83
End: 2018-02-26
Payer: MEDICARE

## 2018-02-26 VITALS
TEMPERATURE: 97.8 F | HEIGHT: 62 IN | RESPIRATION RATE: 16 BRPM | DIASTOLIC BLOOD PRESSURE: 80 MMHG | SYSTOLIC BLOOD PRESSURE: 118 MMHG

## 2018-02-26 DIAGNOSIS — D32.9 MENINGIOMA (HCC): ICD-10-CM

## 2018-02-26 DIAGNOSIS — S09.90XD CLOSED HEAD INJURY, SUBSEQUENT ENCOUNTER: ICD-10-CM

## 2018-02-26 DIAGNOSIS — S06.5X9A SUBDURAL HEMATOMA (HCC): Primary | ICD-10-CM

## 2018-02-26 DIAGNOSIS — S06.6X0D SUBARACHNOID HEMATOMA, WITHOUT LOSS OF CONSCIOUSNESS, SUBSEQUENT ENCOUNTER: ICD-10-CM

## 2018-02-26 PROCEDURE — 99213 OFFICE O/P EST LOW 20 MIN: CPT | Performed by: PHYSICIAN ASSISTANT

## 2018-02-26 RX ORDER — NAPROXEN 375 MG/1
375 TABLET ORAL 2 TIMES DAILY WITH MEALS
COMMUNITY
End: 2018-04-13 | Stop reason: ALTCHOICE

## 2018-02-26 RX ORDER — BISACODYL 10 MG
10 SUPPOSITORY, RECTAL RECTAL DAILY
COMMUNITY
End: 2018-04-13 | Stop reason: ALTCHOICE

## 2018-02-26 RX ORDER — TAMSULOSIN HYDROCHLORIDE 0.4 MG/1
0.4 CAPSULE ORAL DAILY
COMMUNITY
End: 2018-04-13 | Stop reason: ALTCHOICE

## 2018-02-26 NOTE — LETTER
February 26, 2018     MD Renny Espitia  [address] [address]    Patient: Iris Gibbs   YOB: 1930   Date of Visit: 2/26/2018       Dear Dr Gabriel Linda: Thank you for referring Darren Johnston to me for evaluation  Below are my notes for this consultation  If you have questions, please do not hesitate to call me  I look forward to following your patient along with you  Sincerely,        Kaylee Norwood PA-C        CC: No Recipients  Kaylee Norwood PA-C  2/26/2018 11:46 AM  Sign at close encounter  Assessment/Plan:    Very pleasant 27-year-old female, arrives by stretcher, accompanied by ambulance personnel  Presents to review CT head 2/22/18  The study was carefully reviewed in detail by Dr Liang Cortez and compared with prior studies 2/11/18, and 2/10/18 the prior identified subdural hematoma and subarachnoid hemorrhage is significantly improved there is still a very small amount in the left ventricle  No evidence of hydrocephalus is appreciated at this time  She also has na approximately 1 5 cm left anterior parafalcine calcified meningioma, which appears unchanged when compared with CT head 7/21/16 and 6/20/16  Clinically the patient is doing very well, she denies headache, she does report some dizziness occasionally  There are no focal neurologic deficits on examination  Further follow-up is planned in approximately 3 weeks to ensure complete resolution, with CT head and clinical visit  She may gradually increase her activity levels, she is to continue to avoid aspirin, or other anti-platelet agents, she does continue to take Naprosyn for chronic arthritic pain this may be continued  Continue physical therapy for gait and balance and overall strengthening is also advised      These findings, impressions and recommendations are reviewed in great detail with the patient, she expressed understanding and agreement, her questions were answered completely and to her satisfaction  Follow up has been scheduled  Diagnoses and all orders for this visit:    Subdural hematoma ([address] )  -     CT head without contrast; Future    Closed head injury, subsequent encounter    Meningioma (HCC)    Subarachnoid hematoma, without loss of consciousness, subsequent encounter              Subjective:      Patient ID: Mendel Carmona is a 80 y o  female  Very pleasant 77-year-old female, arrives by stretcher from her current extended care facility [address]  She has had her updated CT head as requested 2/22/18  She reports overall she is doing a lot better she does have some dizziness issues but denies headache, difficulty with memory or mentation, difficulty with executive function  She is undergoing physical therapy for rehab secondary to her fall she had multiple contusions  She was evaluated in the emergency department at [facility] following a fall, was subsequently transferred to [facility] for neurosurgical consultation  She reports she was shoveling snow, 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 and slipped and fell  This event occurred 2/9/18 she had a brief inpatient hospital stay through 2/11/17 and was transferred to [address] for further rehabilitation  No other interval changes in medical or surgical history reported  The following portions of the patient's history were reviewed and updated as appropriate: allergies, current medications, past family history, past medical history, past social history and past surgical history  Review of Systems   Constitutional: Negative  HENT: Negative  Eyes: Negative  Respiratory: Negative  Cardiovascular: Negative  Gastrointestinal: Negative  Endocrine: Negative  Genitourinary: Positive for urgency   Negative for decreased urine volume, difficulty urinating, dyspareunia, dysuria, enuresis, flank pain, frequency, genital sores, hematuria, menstrual problem, pelvic pain, vaginal bleeding, vaginal discharge and vaginal pain  Musculoskeletal: Positive for arthralgias (right leg pain) and back pain (low back pain)  Negative for gait problem, joint swelling, myalgias, neck pain and neck stiffness  Skin: Negative  Allergic/Immunologic: Negative  Neurological: Positive for dizziness  Negative for tremors, seizures, syncope, facial asymmetry, speech difficulty, weakness, light-headedness, numbness and headaches  Hematological: Negative  Psychiatric/Behavioral: Negative  Objective:    Physical Exam   Constitutional: She is oriented to person, place, and time  She appears well-developed  HENT:   Head: Normocephalic  Eyes: Pupils are equal, round, and reactive to light  Cardiovascular: Normal rate and regular rhythm  Pulmonary/Chest: Effort normal and breath sounds normal    Neurological: She is alert and oriented to person, place, and time  She has a normal Finger-Nose-Finger Test    Skin: Skin is warm and dry  Neurologic Exam     Mental Status   Oriented to person, place, and time  Level of consciousness: alert    Cranial Nerves     CN III, IV, VI   Pupils are equal, round, and reactive to light      Motor Exam   Right arm pronator drift: absent  Left arm pronator drift: absent    Strength   Right neck flexion: 4/5  Left neck flexion: 4/5  Right neck extension: 4/5  Left neck extension: 4/5  Right deltoid: 4/5  Left deltoid: 4/5  Right biceps: 4/5  Left biceps: 4/5  Right triceps: 4/5  Left triceps: 4/5  Right wrist flexion: 4/5  Left wrist flexion: 4/5  Right wrist extension: 4/5  Left wrist extension: 4/5  Right interossei: 4/5  Left interossei: 4/5  Right abdominals: 4/5  Left abdominals: 4/5  Right iliopsoas: 4/5  Left iliopsoas: 4/5  Right quadriceps: 4/5  Left quadriceps: 4/5  Right hamstrin/5  Left hamstrin/5  Right glutei: 4/5  Left glutei: 4/5  Right anterior tibial: 4/5  Left anterior tibial: 4/5  Right posterior tibial: 4/5  Left posterior tibial: 4/5  Right peroneal: 4/5  Left peroneal: 4/5  Right gastroc: 4/5  Left gastroc: 4/5    Sensory Exam   Light touch normal      Gait, Coordination, and Reflexes     Gait  Gait: (Stretcher at time of examination )    Coordination   Finger to nose coordination: normal

## 2018-02-26 NOTE — PROGRESS NOTES
Assessment/Plan:    Very pleasant 49-year-old female, arrives by stretcher, accompanied by ambulance personnel  Presents to review CT head 2/22/18  The study was carefully reviewed in detail by Dr Jane Macedo and compared with prior studies 2/11/18, and 2/10/18 the prior identified subdural hematoma and subarachnoid hemorrhage is significantly improved there is still a very small amount in the left ventricle  No evidence of hydrocephalus is appreciated at this time  She also has na approximately 1 5 cm left anterior parafalcine calcified meningioma, which appears unchanged when compared with CT head 7/21/16 and 6/20/16  Clinically the patient is doing very well, she denies headache, she does report some dizziness occasionally  There are no focal neurologic deficits on examination  Further follow-up is planned in approximately 3 weeks to ensure complete resolution, with CT head and clinical visit  She may gradually increase her activity levels, she is to continue to avoid aspirin, or other anti-platelet agents, she does continue to take Naprosyn for chronic arthritic pain this may be continued  Continue physical therapy for gait and balance and overall strengthening is also advised  These findings, impressions and recommendations are reviewed in great detail with the patient, she expressed understanding and agreement, her questions were answered completely and to her satisfaction  Follow up has been scheduled  Diagnoses and all orders for this visit:    Subdural hematoma ([address] )  -     CT head without contrast; Future    Closed head injury, subsequent encounter    Meningioma (HCC)    Subarachnoid hematoma, without loss of consciousness, subsequent encounter              Subjective:      Patient ID: So Adamson is a 80 y o  female  Very pleasant 49-year-old female, arrives by stretcher from her current extended care facility [address]      She has had her updated CT head as requested 2/22/18  She reports overall she is doing a lot better she does have some dizziness issues but denies headache, difficulty with memory or mentation, difficulty with executive function  She is undergoing physical therapy for rehab secondary to her fall she had multiple contusions  She was evaluated in the emergency department at [facility] following a fall, was subsequently transferred to [facility] for neurosurgical consultation  She reports she was shoveling snow, 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 and slipped and fell  This event occurred 2/9/18 she had a brief inpatient hospital stay through 2/11/17 and was transferred to [address] for further rehabilitation  No other interval changes in medical or surgical history reported  The following portions of the patient's history were reviewed and updated as appropriate: allergies, current medications, past family history, past medical history, past social history and past surgical history  Review of Systems   Constitutional: Negative  HENT: Negative  Eyes: Negative  Respiratory: Negative  Cardiovascular: Negative  Gastrointestinal: Negative  Endocrine: Negative  Genitourinary: Positive for urgency  Negative for decreased urine volume, difficulty urinating, dyspareunia, dysuria, enuresis, flank pain, frequency, genital sores, hematuria, menstrual problem, pelvic pain, vaginal bleeding, vaginal discharge and vaginal pain  Musculoskeletal: Positive for arthralgias (right leg pain) and back pain (low back pain)  Negative for gait problem, joint swelling, myalgias, neck pain and neck stiffness  Skin: Negative  Allergic/Immunologic: Negative  Neurological: Positive for dizziness  Negative for tremors, seizures, syncope, facial asymmetry, speech difficulty, weakness, light-headedness, numbness and headaches  Hematological: Negative  Psychiatric/Behavioral: Negative  Objective:    Physical Exam   Constitutional: She is oriented to person, place, and time  She appears well-developed  HENT:   Head: Normocephalic  Eyes: Pupils are equal, round, and reactive to light  Cardiovascular: Normal rate and regular rhythm  Pulmonary/Chest: Effort normal and breath sounds normal    Neurological: She is alert and oriented to person, place, and time  She has a normal Finger-Nose-Finger Test    Skin: Skin is warm and dry  Neurologic Exam     Mental Status   Oriented to person, place, and time  Level of consciousness: alert    Cranial Nerves     CN III, IV, VI   Pupils are equal, round, and reactive to light      Motor Exam   Right arm pronator drift: absent  Left arm pronator drift: absent    Strength   Right neck flexion: 4/5  Left neck flexion: 4/5  Right neck extension: 4/5  Left neck extension: 4/5  Right deltoid: 4/5  Left deltoid: 4/5  Right biceps: 4/5  Left biceps: 4/5  Right triceps: 4/5  Left triceps: 4/5  Right wrist flexion: 4/5  Left wrist flexion: 4/5  Right wrist extension: 4/5  Left wrist extension: 4/5  Right interossei: 4/5  Left interossei: 4/5  Right abdominals: 4/5  Left abdominals: 4/5  Right iliopsoas: 4/5  Left iliopsoas: 4/5  Right quadriceps: 4/5  Left quadriceps: 4/5  Right hamstrin/5  Left hamstrin/5  Right glutei: 4/5  Left glutei: 4/5  Right anterior tibial: 4/5  Left anterior tibial: 4/5  Right posterior tibial: 4/5  Left posterior tibial: 4/5  Right peroneal: 4/5  Left peroneal: 4/5  Right gastroc: 4/5  Left gastroc: 4/5    Sensory Exam   Light touch normal      Gait, Coordination, and Reflexes     Gait  Gait: (Stretcher at time of examination )    Coordination   Finger to nose coordination: normal

## 2018-03-19 ENCOUNTER — HOSPITAL ENCOUNTER (OUTPATIENT)
Dept: CT IMAGING | Facility: HOSPITAL | Age: 83
Discharge: HOME | End: 2018-03-19
Payer: MEDICARE

## 2018-03-19 DIAGNOSIS — S06.5X9A SUBDURAL HEMATOMA (HCC): ICD-10-CM

## 2018-03-19 PROCEDURE — 70450 CT HEAD/BRAIN W/O DYE: CPT

## 2018-03-26 ENCOUNTER — TRANSITIONAL CARE MANAGEMENT (OUTPATIENT)
Dept: FAMILY MEDICINE CLINIC | Facility: CLINIC | Age: 83
End: 2018-03-26

## 2018-04-13 ENCOUNTER — OFFICE VISIT (OUTPATIENT)
Dept: FAMILY MEDICINE CLINIC | Facility: CLINIC | Age: 83
End: 2018-04-13
Payer: MEDICARE

## 2018-04-13 VITALS
TEMPERATURE: 96.5 F | DIASTOLIC BLOOD PRESSURE: 80 MMHG | HEART RATE: 80 BPM | SYSTOLIC BLOOD PRESSURE: 118 MMHG | WEIGHT: 161.8 LBS | RESPIRATION RATE: 16 BRPM | BODY MASS INDEX: 29.77 KG/M2 | HEIGHT: 62 IN

## 2018-04-13 DIAGNOSIS — S06.5X9A SUBDURAL HEMATOMA (HCC): ICD-10-CM

## 2018-04-13 DIAGNOSIS — R60.9 PERIPHERAL EDEMA: ICD-10-CM

## 2018-04-13 DIAGNOSIS — E78.5 HYPERLIPIDEMIA, UNSPECIFIED HYPERLIPIDEMIA TYPE: Primary | ICD-10-CM

## 2018-04-13 DIAGNOSIS — G89.29 CHRONIC PAIN OF RIGHT KNEE: ICD-10-CM

## 2018-04-13 DIAGNOSIS — I10 ESSENTIAL HYPERTENSION: Chronic | ICD-10-CM

## 2018-04-13 DIAGNOSIS — M25.561 CHRONIC PAIN OF RIGHT KNEE: ICD-10-CM

## 2018-04-13 PROCEDURE — 99214 OFFICE O/P EST MOD 30 MIN: CPT | Performed by: FAMILY MEDICINE

## 2018-04-13 RX ORDER — SIMVASTATIN 40 MG
40 TABLET ORAL
Qty: 90 TABLET | Refills: 3 | Status: SHIPPED | OUTPATIENT
Start: 2018-04-13 | End: 2018-07-29 | Stop reason: HOSPADM

## 2018-04-15 PROBLEM — R60.9 PERIPHERAL EDEMA: Status: ACTIVE | Noted: 2018-04-15

## 2018-04-15 NOTE — ASSESSMENT & PLAN NOTE
Peripheral edema  Pt will start using her compression stockings and keep her legs elevated  If persists after 2 weeks we will consider incease in her diuretic therapy

## 2018-04-15 NOTE — PROGRESS NOTES
FAMILY PRACTICE OFFICE VISIT       NAME: Mukesh Cotton  AGE: 80 y o  SEX: female       : 1930        MRN: [id number]    DATE: 4/15/2018  TIME: 9:53 AM    Assessment and Plan     Problem List Items Addressed This Visit     Hypertension (Chronic)     HTN  Pt bp stable and she will continue with current regimen of meds  Hyperlipidemia - Primary    Relevant Medications    simvastatin (ZOCOR) 40 mg tablet    Subdural hematoma (HCC)     Subdural hemorrhage  Pt stable with no new sx's  Latest CT scan stable  Peripheral edema     Peripheral edema  Pt will start using her compression stockings and keep her legs elevated  If persists after 2 weeks we will consider incease in her diuretic therapy  Other Visit Diagnoses     Chronic pain of right knee        Relevant Medications    diclofenac sodium (VOLTAREN) 1 %            There are no Patient Instructions on file for this visit  Chief Complaint     Chief Complaint   Patient presents with    Follow-up     Pt is here for follow up from d/c from [facility] rehab        History of Present Illness     I reviewed d/c summary from latest hospital visit  She returned from [facility] for rehabilitation  She will continue to receive P T  At home  She continues to have regular R knee pain as well as chronic lower extremity swelling  She denies h/a's  Review of Systems   Review of Systems   Constitutional: Positive for fatigue  Negative for fever  HENT: Negative  Respiratory: Negative  Cardiovascular: Negative  Gastrointestinal: Negative  Musculoskeletal: Positive for myalgias  As per HPI   Neurological:        Gait disturbance  Pt uses walker with wheels         Active Problem List     Patient Active Problem List   Diagnosis    Nephrolithiasis    Hypertension    Hyperbilirubinemia    Hyperlipidemia    Amaurosis fugax    Hearing loss    Subarachnoid hemorrhage (HCC)    Tinea pedis    Vitamin D deficiency    Subdural hematoma (HCC)    Lumbar disc disease    Fall    Bilateral shoulder pain    Peripheral edema       Past Medical History:  Past Medical History:   Diagnosis Date    Amaurosis fugax 11/18/2016    Arthritis     History of subarachnoid hemorrhage     Hyperlipidemia     Hypertension     Nephrolithiasis        Past Surgical History:  Past Surgical History:   Procedure Laterality Date    LITHOTRIPSY      [place] FRAGMENT KIDNEY STONE/ ESWL Left 1/3/2017    Procedure: LITHROTRIPSY EXTRACORPORAL SHOCKWAVE (ESWL); Surgeon: Art Brown MD;  [facility];  Service: Urology    [place] FRAGMENT KIDNEY STONE/ ESWL Right 11/18/2016    Procedure: Phoebe Davilatingham SHOCKWAVE (ESWL); Surgeon: Art Brown MD;  [facility];  Service: Urology    URETERAL STENT PLACEMENT Bilateral 11/9/2016    Procedure: Elaine Vásquez; Nathan Mejias ;  Surgeon: Art Brown MD;  [facility];  Service:        Family History:  Family History   Problem Relation Age of Onset    Pneumonia Mother     Cancer Sister     Prostate cancer Brother        Social History:  Social History     Social History    Marital status:      Spouse name: N/A    Number of children: N/A    Years of education: N/A     Occupational History    Not on file  Social History Main Topics    Smoking status: Never Smoker    Smokeless tobacco: Never Used    Alcohol use No    Drug use: No    Sexual activity: Not on file     Other Topics Concern    Not on file     Social History Narrative    No narrative on file       Objective     Vitals:    04/13/18 1614   BP: 118/80   Pulse: 80   Resp: 16   Temp: (!) 96 5 °F (35 8 °C)     Wt Readings from Last 3 Encounters:   04/13/18 73 4 kg (161 lb 12 8 oz)   02/10/18 81 2 kg (179 lb)   12/06/17 75 9 kg (167 lb 4 oz)       Physical Exam   Constitutional: She is oriented to person, place, and time  No distress     Cardiovascular: Normal rate, regular rhythm and normal heart sounds  Pulmonary/Chest: Effort normal and breath sounds normal  No respiratory distress  She has no wheezes  She has no rales  Musculoskeletal:   Pt with +2 edema bilateral lower extremities -Oskar's sign  Neurological: She is alert and oriented to person, place, and time  No cranial nerve deficit  Pertinent Laboratory/Diagnostic Studies:  Lab Results   Component Value Date    GLUCOSE 164 (H) 02/09/2018    BUN 25 02/09/2018    CREATININE 1 02 02/09/2018    CALCIUM 9 5 02/09/2018     02/09/2018    K 3 6 02/09/2018    CO2 26 02/09/2018     02/09/2018     Lab Results   Component Value Date    ALT 26 02/09/2018    AST 22 02/09/2018    GGT 10 06/22/2016    ALKPHOS 110 02/09/2018    BILITOT 0 70 02/09/2018       Lab Results   Component Value Date    WBC 8 88 02/09/2018    HGB 14 5 02/09/2018    HCT 44 1 02/09/2018    MCV 89 02/09/2018     02/09/2018       No results found for: TSH    Lab Results   Component Value Date    CHOL 158 12/06/2017     Lab Results   Component Value Date    TRIG 76 12/06/2017     Lab Results   Component Value Date    HDL 62 (H) 12/06/2017     Lab Results   Component Value Date    LDLCALC 81 12/06/2017     No results found for: HGBA1C    Results for orders placed or performed during the hospital encounter of 02/09/18   Troponin I   Result Value Ref Range    Troponin I 0 02 <=0 04 ng/mL       No orders of the defined types were placed in this encounter  ALLERGIES:  No Known Allergies    Current Medications     Current Outpatient Prescriptions   Medication Sig Dispense Refill    acetaminophen (TYLENOL) 325 mg tablet Take 2 tablets by mouth every 6 (six) hours as needed for mild pain (Patient taking differently: Take 325 mg by mouth every 6 (six) hours as needed for mild pain  ) 30 tablet 0    calcium-vitamin D (OSCAL 500 + D) 500 mg-200 units per tablet Take 1 tablet by mouth daily   1000 iu      furosemide (LASIX) 20 mg tablet Take 20 mg by mouth daily       magnesium hydroxide (MILK OF MAGNESIA) 400 mg/5 mL oral suspension Take 30 mL by mouth daily as needed for constipation      Multiple Vitamin (MULTIVITAMIN) tablet Take 1 tablet by mouth daily   potassium chloride (K-DUR) 10 mEq tablet Take 10 mEq by mouth daily   simvastatin (ZOCOR) 40 mg tablet Take 1 tablet (40 mg total) by mouth daily at bedtime 90 tablet 3    diclofenac sodium (VOLTAREN) 1 % Apply 2 g topically 4 (four) times a day 100 g 5    polyethylene glycol (MIRALAX) 17 g packet Take 17 g by mouth daily for 7 days 119 g 0    senna (SENOKOT) 8 6 mg Take 1 tablet by mouth 2 (two) times a day for 7 days (Patient taking differently: Take 1 tablet by mouth daily  ) 14 tablet 0     No current facility-administered medications for this visit            Health Maintenance     Health Maintenance   Topic Date Due    SLP PLAN OF CARE  06[birth year]05/1930    Depression Screening PHQ-9  06[birth year]05/1942    DTaP,Tdap,and Td Vaccines (1 - Tdap) 06/05/1951    Fall Risk  06/05/1995    Urinary Incontinence Screening  06/05/1995    PNEUMOCOCCAL POLYSACCHARIDE VACCINE AGE 65 AND OVER  06/05/1995    GLAUCOMA SCREENING 67+ YR  06/05/1997    INFLUENZA VACCINE  Completed     Immunization History   Administered Date(s) Administered    Influenza 11/22/2016, 12/06/2017    Influenza Split High Dose Preservative Free IM 10/27/2014, 10/30/2015, 11/22/2016, 12/06/2017    Influenza TIV (IM) 10/21/2011, 10/21/2013    Pneumococcal Conjugate 13-Valent 11/80/4224       Caitlin Monroe MD

## 2018-06-06 ENCOUNTER — OFFICE VISIT (OUTPATIENT)
Dept: FAMILY MEDICINE CLINIC | Facility: CLINIC | Age: 83
End: 2018-06-06
Payer: MEDICARE

## 2018-06-06 VITALS
HEART RATE: 64 BPM | DIASTOLIC BLOOD PRESSURE: 70 MMHG | TEMPERATURE: 95.9 F | BODY MASS INDEX: 28.34 KG/M2 | SYSTOLIC BLOOD PRESSURE: 120 MMHG | RESPIRATION RATE: 16 BRPM | HEIGHT: 62 IN | WEIGHT: 154 LBS

## 2018-06-06 DIAGNOSIS — R60.9 PERIPHERAL EDEMA: ICD-10-CM

## 2018-06-06 DIAGNOSIS — I10 ESSENTIAL HYPERTENSION: Primary | ICD-10-CM

## 2018-06-06 PROCEDURE — G0439 PPPS, SUBSEQ VISIT: HCPCS | Performed by: FAMILY MEDICINE

## 2018-06-06 PROCEDURE — 99213 OFFICE O/P EST LOW 20 MIN: CPT | Performed by: FAMILY MEDICINE

## 2018-06-06 RX ORDER — POTASSIUM CHLORIDE 750 MG/1
10 TABLET, FILM COATED, EXTENDED RELEASE ORAL DAILY
Qty: 90 TABLET | Refills: 3 | Status: SHIPPED | OUTPATIENT
Start: 2018-06-06 | End: 2018-07-29 | Stop reason: HOSPADM

## 2018-06-06 RX ORDER — FUROSEMIDE 20 MG/1
20 TABLET ORAL DAILY
Qty: 90 TABLET | Refills: 3 | Status: SHIPPED | OUTPATIENT
Start: 2018-06-06 | End: 2018-07-29 | Stop reason: HOSPADM

## 2018-06-06 NOTE — PROGRESS NOTES
Assessment and Plan:  Problem List Items Addressed This Visit     Hypertension - Primary (Chronic)     Hypertension  Blood pressure is stable at this time and she will continue with current regimen of medications         Relevant Medications    furosemide (LASIX) 20 mg tablet    potassium chloride (K-DUR) 10 mEq tablet    Other Relevant Orders    Comprehensive metabolic panel    TSH, 3rd generation    CBC    Peripheral edema     Peripheral edema  Patient was advised to increase her Lasix and potassium to use 2 tablets once daily for 1 week and return to usual 1 tablet daily of each thereafter  She was given prescription to obtain blood work in 1-2 weeks after completing this regimen  She was also recommended to purchase large size support stockings from her podiatrist to assist with her peripheral edema as patient has difficulties with gait disturbance             Health Maintenance Due   Topic Date Due    Depression Screening PHQ-9  06[birth year]05/1930    SLP PLAN OF CARE  06/05/1930    DTaP,Tdap,and Td Vaccines (1 - Tdap) 06/05/1951    Fall Risk  06/05/1995    Urinary Incontinence Screening  06/05/1995    PNEUMOCOCCAL POLYSACCHARIDE VACCINE AGE 65 AND OVER  06/05/1995    GLAUCOMA SCREENING 67+ YR  06/05/1997         HPI:  Jimena Powers is a 80 y o  female here for her Subsequent Wellness Visit      Patient Active Problem List   Diagnosis    Nephrolithiasis    Hypertension    Hyperbilirubinemia    Hyperlipidemia    Amaurosis fugax    Hearing loss    Subarachnoid hemorrhage (HCC)    Tinea pedis    Vitamin D deficiency    Subdural hematoma (HCC)    Lumbar disc disease    Fall    Bilateral shoulder pain    Peripheral edema     Past Medical History:   Diagnosis Date    Amaurosis fugax 11/18/2016    Arthritis     History of subarachnoid hemorrhage     Hyperlipidemia     Hypertension     Nephrolithiasis      Past Surgical History:   Procedure Laterality Date    LITHOTRIPSY      [place] FRAGMENT KIDNEY STONE/ ESWL Left 1/3/2017    Procedure: LITHROTRIPSY EXTRACORPORAL SHOCKWAVE (ESWL); Surgeon: Sheila Dacosta MD;  [facility];  Service: Urology    [place] FRAGMENT KIDNEY STONE/ ESWL Right 11/18/2016    Procedure: Tiffany Scheuermann SHOCKWAVE (ESWL); Surgeon: Sheila Dacosta MD;  [facility];  Service: Urology    URETERAL STENT PLACEMENT Bilateral 11/9/2016    Procedure: Suleiman Rogers; Judah Barajas ;  Surgeon: Sheila Dacosta MD;  [facility];  Service:      Family History   Problem Relation Age of Onset    Pneumonia Mother     Cancer Sister     Prostate cancer Brother      History   Smoking Status    Never Smoker   Smokeless Tobacco    Never Used     History   Alcohol Use No      History   Drug Use No         Current Outpatient Prescriptions   Medication Sig Dispense Refill    acetaminophen (TYLENOL) 325 mg tablet Take 2 tablets by mouth every 6 (six) hours as needed for mild pain (Patient taking differently: Take 325 mg by mouth every 6 (six) hours as needed for mild pain  ) 30 tablet 0    calcium-vitamin D (OSCAL 500 + D) 500 mg-200 units per tablet Take 1 tablet by mouth daily  1000 iu      diclofenac sodium (VOLTAREN) 1 % Apply 2 g topically 4 (four) times a day 100 g 5    furosemide (LASIX) 20 mg tablet Take 1 tablet (20 mg total) by mouth daily 90 tablet 3    magnesium hydroxide (MILK OF MAGNESIA) 400 mg/5 mL oral suspension Take 30 mL by mouth daily as needed for constipation      Multiple Vitamin (MULTIVITAMIN) tablet Take 1 tablet by mouth daily        potassium chloride (K-DUR) 10 mEq tablet Take 1 tablet (10 mEq total) by mouth daily 90 tablet 3    simvastatin (ZOCOR) 40 mg tablet Take 1 tablet (40 mg total) by mouth daily at bedtime 90 tablet 3    polyethylene glycol (MIRALAX) 17 g packet Take 17 g by mouth daily for 7 days 119 g 0    senna (SENOKOT) 8 6 mg Take 1 tablet by mouth 2 (two) times a day for 7 days (Patient taking differently: Take 1 tablet by mouth daily  ) 14 tablet 0     No current facility-administered medications for this visit        No Known Allergies  Immunization History   Administered Date(s) Administered    Influenza 11/22/2016, 12/06/2017    Influenza Split High Dose Preservative Free IM 10/27/2014, 10/30/2015, 11/22/2016, 12/06/2017    Influenza TIV (IM) 10/21/2011, 10/21/2013    Pneumococcal Conjugate 13-Valent 06/06/2017       Patient Care Team:  Caitlin Monroe MD as PCP - General (Family Medicine)  MD Alex Ponce Caro, DPM    Medicare Screening Tests and Risk Assessments:  AWV Clinical     ISAR:   Previous hospitalizations?:  Yes   How many hospitalizations have you had in the last year?:  1-2   Additional Comments:  Fall while shoveling snow in January       Once in a Lifetime Medicare Screening:   EKG performed?:  No    AAA screening performed? (if performed, please add date to Health Maintenance):  No       Medicare Screening Tests and Risk Assessment:   AAA Risk Assessment    None Indicated:  Yes    Osteoporosis Risk Assessment     Female:  Yes   :  Yes :  No   Age over 48:  Yes Low body weight (<127lbs):  No   Tobacco use:  No Alcohol use:  Yes   Low calcium diet:  No PMHX of fractures:  Yes   FHX of fractures:  Yes    HIV Risk Assessment    None indicated:  Yes        Drug and Alcohol Use:   Tobacco use    Cigarettes:  never smoker    Tobacco use duration    Tobacco Cessation Readiness    Alcohol use    Alcohol use:  rare use    Concern about alcohol use:  No    Alcohol Treatment Readiness   Illicit Drug Use    Drug use:  never    Drug type:  no sedative use       Diet & Exercise:   Diet   What is your diet?:  Regular   How many servings a day of the following:   Fruits and Vegetables:  1-2 Meat:  1-2   Whole Grains:  1 Simple Carbs:  2   Dairy:  2 Soda:  0   Coffee:  1 Tea:  0   Exercise    Do you currently exercise?:  yes    Frequency:  daily    Minutes per day:  10   stretching        Cognitive Impairment Screening:   Depression screening preformed:  Yes     PHQ-9 Depression scale score:  0   Depression screening results:  negative for symptoms   Cognitive Impairment Screening    Do you have difficulty learning or retaining new information?:  No Do you have difficulty handling new tasks?:  No   Do you have difficulty with reasoning?:  No Do you have difficulty with spatial ability and orientation?:  No   Do you have difficulty with language?:  No Do you have difficulty with behavior?:  No       Functional Ability/Level of Safety:   Hearing    Hearing difficulties:  Yes Bilateral:  significantly decreased   Hearing aid:  No    Hearing Impairment Assessment    Hearing status:  Hard of hearing   Current Activities    Status:  limited ADL's, limited IADL's, limited social activities, no driving   Help needed with the folllowing:    Using the phone:  No Transportation:  Yes   Shopping:  Yes Preparing Meals:  Yes   Doing Housework:  Yes Doing Laundry:  Yes   Managing Medications:  No Managing Money:  Yes   ADL    Feeding:  Independant   Oral hygiene and Facial grooming:  Independant   Bathing:  Independant   Upper Body Dressing:  Independant   Lower Body Dressing:  Independant   Toileting:  Independant   Bed Mobility:  Independant   Fall Risk   Have you fallen in the last 12 months?:  Yes Are you unsteady on your feet?:  Yes   How many times?:  1 Are you taking any medications that may cause fatigue or dizziness?:  Yes    Do you rush to the bathroom potentially risking a fall?:  No   Injury History   Polypharmacy:  Yes Antidepressant Use:  No   Sedative Use:  No Antihypertensive Use:  No   Previous Fall:  Yes Alcohol Use:  Yes   Deconditioning:  Yes Visual Impairment:  No   Cogitive Impairment:  No Mmobility Impairment:  Yes   Postural Hypotension:  No Urinary Incontinence:  Yes       Home Safety:   Are there hazards in your environment?:  No   Home Safety Risk Factors   Unfamilar with surroundings:  No Uneven floors:  No   Stairs or handrail saftey risk:  No Loose rugs:  No   Household clutter:  No Poor household lighting:  No   No grab bars in bathroom:  No Further evaluation needed:  No       Advanced Directives:   Advanced Directives    Living Will:  Yes Durable POA for healthcare:   Yes   Advanced directive:  Yes    Patient's End of Life Decisions        Urinary Incontinence:   Do you have urinary incontinence?:  Yes        Glaucoma:            Provider Screening    No data filed        No exam data present    Physical Exam :    Physical Exam   MMSE=30

## 2018-06-06 NOTE — ASSESSMENT & PLAN NOTE
Hypertension    Blood pressure is stable at this time and she will continue with current regimen of medications

## 2018-06-06 NOTE — ASSESSMENT & PLAN NOTE
Peripheral edema  Patient was advised to increase her Lasix and potassium to use 2 tablets once daily for 1 week and return to usual 1 tablet daily of each thereafter  She was given prescription to obtain blood work in 1-2 weeks after completing this regimen    She was also recommended to purchase large size support stockings from her podiatrist to assist with her peripheral edema as patient has difficulties with gait disturbance

## 2018-06-06 NOTE — PROGRESS NOTES
FAMILY PRACTICE OFFICE VISIT       NAME: Lexx Mcknight  AGE: 80 y o  SEX: female       : 1930        MRN: [id number]    DATE: 2018  TIME: 8:11 AM    Assessment and Plan     Problem List Items Addressed This Visit     Hypertension - Primary (Chronic)     Hypertension  Blood pressure is stable at this time and she will continue with current regimen of medications         Relevant Medications    furosemide (LASIX) 20 mg tablet    potassium chloride (K-DUR) 10 mEq tablet    Other Relevant Orders    Comprehensive metabolic panel    TSH, 3rd generation    CBC    Peripheral edema     Peripheral edema  Patient was advised to increase her Lasix and potassium to use 2 tablets once daily for 1 week and return to usual 1 tablet daily of each thereafter  She was given prescription to obtain blood work in 1-2 weeks after completing this regimen  She was also recommended to purchase large size support stockings from her podiatrist to assist with her peripheral edema as patient has difficulties with gait disturbance                 There are no Patient Instructions on file for this visit  Chief Complaint     Chief Complaint   Patient presents with    Medicare Wellness Visit    Follow-up     Pt is here for a f/u from falling       History of Present Illness     Patient denies any recent falls  She does continue to have peripheral edema but has not been wearing her support stockings lately  She is awaiting to get a larger size from her podiatry office  She denies any shortness of breath or chest pain  Patient is accompanied by her son today        Review of Systems   Review of Systems   Constitutional: Negative  Respiratory: Negative  Cardiovascular: Negative  Gastrointestinal: Negative  Genitourinary: Negative      Skin:        As per HPI       Active Problem List     Patient Active Problem List   Diagnosis    Nephrolithiasis    Hypertension    Hyperbilirubinemia    Hyperlipidemia    Amaurosis fugax    Hearing loss    Subarachnoid hemorrhage (HCC)    Tinea pedis    Vitamin D deficiency    Subdural hematoma (HCC)    Lumbar disc disease    Fall    Bilateral shoulder pain    Peripheral edema       Past Medical History:  Past Medical History:   Diagnosis Date    Amaurosis fugax 11/18/2016    Arthritis     History of subarachnoid hemorrhage     Hyperlipidemia     Hypertension     Nephrolithiasis        Past Surgical History:  Past Surgical History:   Procedure Laterality Date    LITHOTRIPSY      [place] FRAGMENT KIDNEY STONE/ ESWL Left 1/3/2017    Procedure: LITHROTRIPSY EXTRACORPORAL SHOCKWAVE (ESWL); Surgeon: Baldwin Baumgarten, MD;  [facility];  Service: Urology    [place] FRAGMENT KIDNEY STONE/ ESWL Right 11/18/2016    Procedure: Elige Fillers SHOCKWAVE (ESWL); Surgeon: Baldwin Baumgarten, MD;  [facility];  Service: Urology    URETERAL STENT PLACEMENT Bilateral 11/9/2016    Procedure: Alexis Adams; Alvaro Bryan ;  Surgeon: Baldwin Baumgarten, MD;  [facility];  Service:        Family History:  Family History   Problem Relation Age of Onset    Pneumonia Mother     Cancer Sister     Prostate cancer Brother        Social History:  Social History     Social History    Marital status:      Spouse name: N/A    Number of children: N/A    Years of education: N/A     Occupational History    Not on file  Social History Main Topics    Smoking status: Never Smoker    Smokeless tobacco: Never Used    Alcohol use No    Drug use: No    Sexual activity: Not on file     Other Topics Concern    Not on file     Social History Narrative    No narrative on file       Objective     Vitals:    06/06/18 0809   BP: 120/70   Pulse:    Resp:    Temp:      Wt Readings from Last 3 Encounters:   06/06/18 69 9 kg (154 lb)   04/13/18 73 4 kg (161 lb 12 8 oz)   02/10/18 81 2 kg (179 lb)       Physical Exam   Constitutional: No distress     Cardiovascular: Regular rate and rhythm with no murmurs   Pulmonary/Chest:   Lungs are clear to auscultation without wheezes,rales, or rhonchi   Musculoskeletal:   +1-2 peripheral edema bilateral lower extremities from knees to foot       Pertinent Laboratory/Diagnostic Studies:  Lab Results   Component Value Date    GLUCOSE 164 (H) 02/09/2018    BUN 25 02/09/2018    CREATININE 1 02 02/09/2018    CALCIUM 9 5 02/09/2018     02/09/2018    K 3 6 02/09/2018    CO2 26 02/09/2018     02/09/2018     Lab Results   Component Value Date    ALT 26 02/09/2018    AST 22 02/09/2018    GGT 10 06/22/2016    ALKPHOS 110 02/09/2018    BILITOT 0 70 02/09/2018       Lab Results   Component Value Date    WBC 8 88 02/09/2018    HGB 14 5 02/09/2018    HCT 44 1 02/09/2018    MCV 89 02/09/2018     02/09/2018       No results found for: TSH    Lab Results   Component Value Date    CHOL 158 12/06/2017     Lab Results   Component Value Date    TRIG 76 12/06/2017     Lab Results   Component Value Date    HDL 62 (H) 12/06/2017     Lab Results   Component Value Date    LDLCALC 81 12/06/2017     No results found for: HGBA1C    Results for orders placed or performed during the hospital encounter of 02/09/18   Troponin I   Result Value Ref Range    Troponin I 0 02 <=0 04 ng/mL       Orders Placed This Encounter   Procedures    Comprehensive metabolic panel    TSH, 3rd generation    CBC       ALLERGIES:  No Known Allergies    Current Medications     Current Outpatient Prescriptions   Medication Sig Dispense Refill    acetaminophen (TYLENOL) 325 mg tablet Take 2 tablets by mouth every 6 (six) hours as needed for mild pain (Patient taking differently: Take 325 mg by mouth every 6 (six) hours as needed for mild pain  ) 30 tablet 0    calcium-vitamin D (OSCAL 500 + D) 500 mg-200 units per tablet Take 1 tablet by mouth daily   1000 iu      diclofenac sodium (VOLTAREN) 1 % Apply 2 g topically 4 (four) times a day 100 g 5    furosemide (LASIX) 20 mg tablet Take 1 tablet (20 mg total) by mouth daily 90 tablet 3    magnesium hydroxide (MILK OF MAGNESIA) 400 mg/5 mL oral suspension Take 30 mL by mouth daily as needed for constipation      Multiple Vitamin (MULTIVITAMIN) tablet Take 1 tablet by mouth daily   potassium chloride (K-DUR) 10 mEq tablet Take 1 tablet (10 mEq total) by mouth daily 90 tablet 3    simvastatin (ZOCOR) 40 mg tablet Take 1 tablet (40 mg total) by mouth daily at bedtime 90 tablet 3    polyethylene glycol (MIRALAX) 17 g packet Take 17 g by mouth daily for 7 days 119 g 0    senna (SENOKOT) 8 6 mg Take 1 tablet by mouth 2 (two) times a day for 7 days (Patient taking differently: Take 1 tablet by mouth daily  ) 14 tablet 0     No current facility-administered medications for this visit            Health Maintenance     Health Maintenance   Topic Date Due    Depression Screening PHQ-9  06/05/1930    SLP PLAN OF CARE  06/05/1930    DTaP,Tdap,and Td Vaccines (1 - Tdap) 06/05/1951    Fall Risk  06/05/1995    Urinary Incontinence Screening  06/05/1995    PNEUMOCOCCAL POLYSACCHARIDE VACCINE AGE 65 AND OVER  06/05/1995    GLAUCOMA SCREENING 67+ YR  06/05/1997    INFLUENZA VACCINE  09/01/2018     Immunization History   Administered Date(s) Administered    Influenza 11/22/2016, 12/06/2017    Influenza Split High Dose Preservative Free IM 10/27/2014, 10/30/2015, 11/22/2016, 12/06/2017    Influenza TIV (IM) 10/21/2011, 10/21/2013    Pneumococcal Conjugate 13-Valent 73/60/1237       Magali Rinne, MD

## 2018-07-12 ENCOUNTER — APPOINTMENT (EMERGENCY)
Dept: RADIOLOGY | Facility: HOSPITAL | Age: 83
DRG: 682 | End: 2018-07-12
Payer: MEDICARE

## 2018-07-12 ENCOUNTER — HOSPITAL ENCOUNTER (INPATIENT)
Facility: HOSPITAL | Age: 83
LOS: 17 days | DRG: 682 | End: 2018-07-29
Attending: EMERGENCY MEDICINE | Admitting: INTERNAL MEDICINE
Payer: MEDICARE

## 2018-07-12 DIAGNOSIS — R53.1 WEAKNESS: ICD-10-CM

## 2018-07-12 DIAGNOSIS — R41.0 DELIRIUM: ICD-10-CM

## 2018-07-12 DIAGNOSIS — A41.9 SEPSIS (HCC): ICD-10-CM

## 2018-07-12 DIAGNOSIS — R53.83 LETHARGY: ICD-10-CM

## 2018-07-12 DIAGNOSIS — G93.40 ENCEPHALOPATHY: ICD-10-CM

## 2018-07-12 DIAGNOSIS — R26.2 AMBULATORY DYSFUNCTION: ICD-10-CM

## 2018-07-12 DIAGNOSIS — R19.5 HEME POSITIVE STOOL: ICD-10-CM

## 2018-07-12 DIAGNOSIS — R00.1 BRADYCARDIA: ICD-10-CM

## 2018-07-12 DIAGNOSIS — N39.0 UTI (URINARY TRACT INFECTION): Primary | ICD-10-CM

## 2018-07-12 DIAGNOSIS — R25.1 TREMOR: ICD-10-CM

## 2018-07-12 DIAGNOSIS — G40.901 STATUS EPILEPTICUS (HCC): ICD-10-CM

## 2018-07-12 DIAGNOSIS — G40.001 LOCALIZATION-RELATED (FOCAL) (PARTIAL) IDIOPATHIC EPILEPSY AND EPILEPTIC SYNDROMES WITH SEIZURES OF LOCALIZED ONSET, NOT INTRACTABLE, WITH STATUS EPILEPTICUS (HCC): ICD-10-CM

## 2018-07-12 LAB
ALBUMIN SERPL BCP-MCNC: 3.2 G/DL (ref 3.5–5)
ALP SERPL-CCNC: 88 U/L (ref 46–116)
ALT SERPL W P-5'-P-CCNC: 22 U/L (ref 12–78)
ANION GAP SERPL CALCULATED.3IONS-SCNC: 8 MMOL/L (ref 4–13)
APTT PPP: 29 SECONDS (ref 24–36)
AST SERPL W P-5'-P-CCNC: 17 U/L (ref 5–45)
BACTERIA UR QL AUTO: ABNORMAL /HPF
BASOPHILS # BLD AUTO: 0.03 THOUSANDS/ΜL (ref 0–0.1)
BASOPHILS NFR BLD AUTO: 1 % (ref 0–1)
BILIRUB SERPL-MCNC: 0.41 MG/DL (ref 0.2–1)
BILIRUB UR QL STRIP: NEGATIVE
BUN SERPL-MCNC: 21 MG/DL (ref 5–25)
CALCIUM SERPL-MCNC: 9.6 MG/DL (ref 8.3–10.1)
CHLORIDE SERPL-SCNC: 103 MMOL/L (ref 100–108)
CLARITY UR: ABNORMAL
CO2 SERPL-SCNC: 29 MMOL/L (ref 21–32)
COLOR UR: YELLOW
CREAT SERPL-MCNC: 1.3 MG/DL (ref 0.6–1.3)
EOSINOPHIL # BLD AUTO: 0.13 THOUSAND/ΜL (ref 0–0.61)
EOSINOPHIL NFR BLD AUTO: 2 % (ref 0–6)
ERYTHROCYTE [DISTWIDTH] IN BLOOD BY AUTOMATED COUNT: 14.1 % (ref 11.6–15.1)
GFR SERPL CREATININE-BSD FRML MDRD: 37 ML/MIN/1.73SQ M
GLUCOSE SERPL-MCNC: 130 MG/DL (ref 65–140)
GLUCOSE UR STRIP-MCNC: NEGATIVE MG/DL
HCT VFR BLD AUTO: 40.8 % (ref 34.8–46.1)
HGB BLD-MCNC: 13.3 G/DL (ref 11.5–15.4)
HGB UR QL STRIP.AUTO: ABNORMAL
IMM GRANULOCYTES # BLD AUTO: 0.01 THOUSAND/UL (ref 0–0.2)
IMM GRANULOCYTES NFR BLD AUTO: 0 % (ref 0–2)
INR PPP: 1.07 (ref 0.86–1.17)
KETONES UR STRIP-MCNC: NEGATIVE MG/DL
LEUKOCYTE ESTERASE UR QL STRIP: ABNORMAL
LYMPHOCYTES # BLD AUTO: 1.52 THOUSANDS/ΜL (ref 0.6–4.47)
LYMPHOCYTES NFR BLD AUTO: 28 % (ref 14–44)
MCH RBC QN AUTO: 30.4 PG (ref 26.8–34.3)
MCHC RBC AUTO-ENTMCNC: 32.6 G/DL (ref 31.4–37.4)
MCV RBC AUTO: 93 FL (ref 82–98)
MONOCYTES # BLD AUTO: 0.48 THOUSAND/ΜL (ref 0.17–1.22)
MONOCYTES NFR BLD AUTO: 9 % (ref 4–12)
NEUTROPHILS # BLD AUTO: 3.19 THOUSANDS/ΜL (ref 1.85–7.62)
NEUTS SEG NFR BLD AUTO: 60 % (ref 43–75)
NITRITE UR QL STRIP: POSITIVE
NON-SQ EPI CELLS URNS QL MICRO: ABNORMAL /HPF
NRBC BLD AUTO-RTO: 0 /100 WBCS
NT-PROBNP SERPL-MCNC: 158 PG/ML
PH UR STRIP.AUTO: 6 [PH] (ref 4.5–8)
PLATELET # BLD AUTO: 172 THOUSANDS/UL (ref 149–390)
PMV BLD AUTO: 10.1 FL (ref 8.9–12.7)
POTASSIUM SERPL-SCNC: 3.6 MMOL/L (ref 3.5–5.3)
PROT SERPL-MCNC: 6.5 G/DL (ref 6.4–8.2)
PROT UR STRIP-MCNC: ABNORMAL MG/DL
PROTHROMBIN TIME: 14 SECONDS (ref 11.8–14.2)
RBC # BLD AUTO: 4.38 MILLION/UL (ref 3.81–5.12)
RBC #/AREA URNS AUTO: ABNORMAL /HPF
SODIUM SERPL-SCNC: 140 MMOL/L (ref 136–145)
SP GR UR STRIP.AUTO: 1.01 (ref 1–1.03)
TROPONIN I SERPL-MCNC: <0.02 NG/ML
UROBILINOGEN UR QL STRIP.AUTO: 0.2 E.U./DL
WBC # BLD AUTO: 5.36 THOUSAND/UL (ref 4.31–10.16)
WBC #/AREA URNS AUTO: ABNORMAL /HPF

## 2018-07-12 PROCEDURE — 84484 ASSAY OF TROPONIN QUANT: CPT | Performed by: EMERGENCY MEDICINE

## 2018-07-12 PROCEDURE — 96365 THER/PROPH/DIAG IV INF INIT: CPT

## 2018-07-12 PROCEDURE — 87186 SC STD MICRODIL/AGAR DIL: CPT

## 2018-07-12 PROCEDURE — 81001 URINALYSIS AUTO W/SCOPE: CPT

## 2018-07-12 PROCEDURE — 85025 COMPLETE CBC W/AUTO DIFF WBC: CPT | Performed by: EMERGENCY MEDICINE

## 2018-07-12 PROCEDURE — 85730 THROMBOPLASTIN TIME PARTIAL: CPT | Performed by: EMERGENCY MEDICINE

## 2018-07-12 PROCEDURE — 74176 CT ABD & PELVIS W/O CONTRAST: CPT

## 2018-07-12 PROCEDURE — 87077 CULTURE AEROBIC IDENTIFY: CPT

## 2018-07-12 PROCEDURE — 36415 COLL VENOUS BLD VENIPUNCTURE: CPT | Performed by: EMERGENCY MEDICINE

## 2018-07-12 PROCEDURE — 99223 1ST HOSP IP/OBS HIGH 75: CPT | Performed by: INTERNAL MEDICINE

## 2018-07-12 PROCEDURE — 85610 PROTHROMBIN TIME: CPT | Performed by: EMERGENCY MEDICINE

## 2018-07-12 PROCEDURE — 83880 ASSAY OF NATRIURETIC PEPTIDE: CPT | Performed by: EMERGENCY MEDICINE

## 2018-07-12 PROCEDURE — 93005 ELECTROCARDIOGRAM TRACING: CPT

## 2018-07-12 PROCEDURE — 71046 X-RAY EXAM CHEST 2 VIEWS: CPT

## 2018-07-12 PROCEDURE — 80053 COMPREHEN METABOLIC PANEL: CPT | Performed by: EMERGENCY MEDICINE

## 2018-07-12 PROCEDURE — 70450 CT HEAD/BRAIN W/O DYE: CPT

## 2018-07-12 PROCEDURE — 87086 URINE CULTURE/COLONY COUNT: CPT

## 2018-07-12 RX ADMIN — CEFTRIAXONE 1000 MG: 1 INJECTION, POWDER, FOR SOLUTION INTRAMUSCULAR; INTRAVENOUS at 20:49

## 2018-07-12 NOTE — ED ATTENDING ATTESTATION
Miguelangel Mahan MD, saw and evaluated the patient  I have discussed the patient with the resident/non-physician practitioner and agree with the resident's/non-physician practitioner's findings, Plan of Care, and MDM as documented in the resident's/non-physician practitioner's note, except where noted  All available labs and Radiology studies were reviewed  At this point I agree with the current assessment done in the Emergency Department  I have conducted an independent evaluation of this patient a history and physical is as follows:      Critical Care Time  CritCare Time    Procedures     81 yo female with hx of htn, hld, sah, c/o generalized weakness started yesterday but called ems today due to inability to get out of chair  Pt with sah few month ago  Pt with no fever, no headache, no cp, no sob, no fever, no chills, no abdominal pain, no urinary complaints  Vss, afebrile, lungs cta, rrr, abdomen soft nontender, pitting edema in le  No focal neuro deficits  Ct head, cardiac workup, bnp, urine, cxr, ekg

## 2018-07-12 NOTE — ED PROVIDER NOTES
History  Chief Complaint   Patient presents with    Weakness - Generalized     Pt states she called ems due to weakness, states she just could not get out of her chair     31-year-old female presenting emergency department with generalized weakness that began yesterday  She called 911 today because she is having difficulty getting up from her chair without assistance which is not her normal  She at baseline she is able to ambulate a without assistance though the performs all of her activities of daily living  She denies any other symptoms on review of systems including lightheadedness, dizziness, fevers or chills, chest pain, shortness of breath, abdominal pain, nausea, vomiting, diarrhea, constipation, urinary complaints, or worsening edema in her lower legs the  She denies any focal neurologic deficits  She does note that she has edema in her lower legs which has been chronic and she takes 20 mg of Lasix daily for this  She denies any acute worsening of this  The she has not had any recent falls or injuries but she notably did have subarachnoid hemorrhage after falling while shoveling snow last February  She has not had any recent changes in her medications the  She has not have a history of heart disease or kidney failure  She has not have a history of stroke  She does not take blood thinners  Prior to Admission Medications   Prescriptions Last Dose Informant Patient Reported? Taking? Multiple Vitamin (MULTIVITAMIN) tablet 7/12/2018 at Unknown time  Yes Yes   Sig: Take 1 tablet by mouth daily  acetaminophen (TYLENOL) 325 mg tablet Past Month at Unknown time  No Yes   Sig: Take 2 tablets by mouth every 6 (six) hours as needed for mild pain   Patient taking differently: Take 325 mg by mouth every 6 (six) hours as needed for mild pain     calcium-vitamin D (OSCAL 500 + D) 500 mg-200 units per tablet 7/12/2018 at Unknown time  Yes Yes   Sig: Take 1 tablet by mouth daily   1000 iu furosemide (LASIX) 20 mg tablet 7/12/2018 at Unknown time  No Yes   Sig: Take 1 tablet (20 mg total) by mouth daily   magnesium hydroxide (MILK OF MAGNESIA) 400 mg/5 mL oral suspension  Outside Facility (Specify) Yes No   Sig: Take 30 mL by mouth daily as needed for constipation   polyethylene glycol (MIRALAX) 17 g packet   No No   Sig: Take 17 g by mouth daily for 7 days   potassium chloride (K-DUR) 10 mEq tablet 7/12/2018 at Unknown time  No Yes   Sig: Take 1 tablet (10 mEq total) by mouth daily   senna (SENOKOT) 8 6 mg  Outside Facility (Specify) No No   Sig: Take 1 tablet by mouth 2 (two) times a day for 7 days   Patient taking differently: Take 1 tablet by mouth daily     simvastatin (ZOCOR) 40 mg tablet 7/11/2018 at Unknown time  No Yes   Sig: Take 1 tablet (40 mg total) by mouth daily at bedtime      Facility-Administered Medications: None       Past Medical History:   Diagnosis Date    Amaurosis fugax 11/18/2016    Arthritis     History of subarachnoid hemorrhage     Hyperlipidemia     Hypertension     Nephrolithiasis        Past Surgical History:   Procedure Laterality Date    LITHOTRIPSY      [place] FRAGMENT KIDNEY STONE/ ESWL Left 1/3/2017    Procedure: LITHROTRIPSY EXTRACORPORAL SHOCKWAVE (ESWL); Surgeon: Stefan Cranker, MD;  [facility];  Service: Urology    [place] FRAGMENT KIDNEY STONE/ ESWL Right 11/18/2016    Procedure: Genevive Neither SHOCKWAVE (ESWL); Surgeon: Stefan Cranker, MD;  [facility];  Service: Urology    URETERAL STENT PLACEMENT Bilateral 11/9/2016    Procedure: Jos Briscoe; STENT INSERTION ;  Surgeon: Stefan Cranker, MD;  [facility];  Service:        Family History   Problem Relation Age of Onset    Pneumonia Mother     Cancer Sister     Prostate cancer Brother      I have reviewed and agree with the history as documented      Social History   Substance Use Topics    Smoking status: Never Smoker    Smokeless tobacco: Never Used    Alcohol use No        Review of Systems   Constitutional: Negative for chills and fever  HENT: Negative for congestion and sore throat  Eyes: Negative for visual disturbance  Respiratory: Negative for cough and shortness of breath  Cardiovascular: Negative for chest pain and palpitations  Gastrointestinal: Negative for abdominal pain, diarrhea, nausea and vomiting  Genitourinary: Negative for decreased urine volume, difficulty urinating, dysuria, frequency, hematuria and pelvic pain  Musculoskeletal: Positive for gait problem  Negative for myalgias  Skin: Negative for rash  Neurological: Positive for weakness (Generalized)  Negative for dizziness, tremors, seizures, syncope, facial asymmetry, speech difficulty, light-headedness, numbness and headaches  Physical Exam  ED Triage Vitals   Temperature Pulse Respirations Blood Pressure SpO2   07/12/18 1845 07/12/18 1843 07/12/18 1843 07/12/18 1843 07/12/18 1843   99 7 °F (37 6 °C) 81 20 152/75 98 %      Temp Source Heart Rate Source Patient Position - Orthostatic VS BP Location FiO2 (%)   07/12/18 1845 07/12/18 2005 07/12/18 2005 07/12/18 2005 --   Rectal Monitor Lying Left arm       Pain Score       07/12/18 1843       No Pain           Orthostatic Vital Signs  Vitals:    07/12/18 1843 07/12/18 1845 07/12/18 2005 07/12/18 2106   BP: 152/75  142/71 130/59   Pulse: 81 80 81 83   Patient Position - Orthostatic VS:   Lying Lying       Physical Exam   Constitutional: She is oriented to person, place, and time  She appears well-developed and well-nourished  No distress  HENT:   Head: Normocephalic and atraumatic  Mouth/Throat: Oropharynx is clear and moist    Eyes: EOM are normal  Pupils are equal, round, and reactive to light  Neck: Normal range of motion  Neck supple  No JVD present  Cardiovascular: Normal rate, regular rhythm, normal heart sounds and intact distal pulses  Exam reveals no gallop and no friction rub      No murmur heard   Pulmonary/Chest: Effort normal and breath sounds normal  No respiratory distress  She has no wheezes  She has no rales  Abdominal: Soft  Bowel sounds are normal  She exhibits mass (Palpable firm mass in the LLQ)  She exhibits no distension  There is tenderness (LLQ and suprapubic)  There is guarding  There is no rebound  Musculoskeletal: Normal range of motion  She exhibits edema (Bilateral 3+ pitting edema)  She exhibits no tenderness or deformity  Lymphadenopathy:     She has no cervical adenopathy  Neurological: She is alert and oriented to person, place, and time  She displays normal reflexes  No cranial nerve deficit or sensory deficit  She exhibits normal muscle tone  Coordination normal    Normal finger to nose and rapid alternating movements  Skin: Skin is warm and dry  Capillary refill takes less than 2 seconds  No erythema  Psychiatric: She has a normal mood and affect  Nursing note and vitals reviewed  ED Medications  Medications   ceftriaxone (ROCEPHIN) 1 g/50 mL in dextrose IVPB (1,000 mg Intravenous New Bag 7/12/18 2049)       Diagnostic Studies  Results Reviewed     Procedure Component Value Units Date/Time    Urine Microscopic [[id number]]  (Abnormal) Collected:  07/12/18 1915    Lab Status:  Final result Specimen:  Urine from Urine, Clean Catch Updated:  07/12/18 2020     RBC, UA 4-10 (A) /hpf      WBC, UA Innumerable (A) /hpf      Epithelial Cells Moderate (A) /hpf      Bacteria, UA Innumerable (A) /hpf     Urine culture [[id number]] Collected:  07/12/18 1915    Lab Status:   In process Specimen:  Urine from Urine, Clean Catch Updated:  07/12/18 2020    Protime-INR [[id number]]  (Normal) Collected:  07/12/18 1936    Lab Status:  Final result Specimen:  Blood from Arm, Right Updated:  07/12/18 1955     Protime 14 0 seconds      INR 1 07    APTT [[id number]]  (Normal) Collected:  07/12/18 1936    Lab Status:  Final result Specimen:  Blood from Arm, Right Updated:  07/12/18 1955 PTT 29 seconds     Comprehensive metabolic panel [[id number]]  (Abnormal) Collected:  07/12/18 1906    Lab Status:  Final result Specimen:  Blood from Arm, Right Updated:  07/1933     Sodium 140 mmol/L      Potassium 3 6 mmol/L      Chloride 103 mmol/L      CO2 29 mmol/L      Anion Gap 8 mmol/L      BUN 21 mg/dL      Creatinine 1 30 mg/dL      Glucose 130 mg/dL      Calcium 9 6 mg/dL      AST 17 U/L      ALT 22 U/L      Alkaline Phosphatase 88 U/L      Total Protein 6 5 g/dL      Albumin 3 2 (L) g/dL      Total Bilirubin 0 41 mg/dL      eGFR 37 ml/min/1 73sq m     Narrative:         National Kidney Disease Education Program recommendations are as follows:  GFR calculation is accurate only with a steady state creatinine  Chronic Kidney disease less than 60 ml/min/1 73 sq  meters  Kidney failure less than 15 ml/min/1 73 sq  meters      B-type natriuretic peptide [[id number]]  (Normal) Collected:  07/12/18 1906    Lab Status:  Final result Specimen:  Blood from Arm, Right Updated:  07/1933     NT-proBNP 158 pg/mL     Troponin I [[id number]]  (Normal) Collected:  07/12/18 1906    Lab Status:  Final result Specimen:  Blood from Arm, Right Updated:  07/12/18 1932     Troponin I <0 02 ng/mL     CBC and differential [[id number]] Collected:  07/12/18 1906    Lab Status:  Final result Specimen:  Blood from Arm, Right Updated:  07/12/18 1918     WBC 5 36 Thousand/uL      RBC 4 38 Million/uL      Hemoglobin 13 3 g/dL      Hematocrit 40 8 %      MCV 93 fL      MCH 30 4 pg      MCHC 32 6 g/dL      RDW 14 1 %      MPV 10 1 fL      Platelets 756 Thousands/uL      nRBC 0 /100 WBCs      Neutrophils Relative 60 %      Immat GRANS % 0 %      Lymphocytes Relative 28 %      Monocytes Relative 9 %      Eosinophils Relative 2 %      Basophils Relative 1 %      Neutrophils Absolute 3 19 Thousands/µL      Immature Grans Absolute 0 01 Thousand/uL      Lymphocytes Absolute 1 52 Thousands/µL      Monocytes Absolute 0 48 Thousand/µL Eosinophils Absolute 0 13 Thousand/µL      Basophils Absolute 0 03 Thousands/µL     POCT urinalysis dipstick [[id number]]  (Abnormal) Resulted:  07/12/18 1906    Lab Status:  Final result Specimen:  Urine Updated:  07/12/18 1906    ED Urine Macroscopic [[id number]]  (Abnormal) Collected:  07/12/18 1915    Lab Status:  Final result Specimen:  Urine Updated:  07/12/18 1905     Color, UA Yellow     Clarity, UA Cloudy     pH, UA 6 0     Leukocytes, UA Large (A)     Nitrite, UA Positive (A)     Protein, UA 30 (1+) (A) mg/dl      Glucose, UA Negative mg/dl      Ketones, UA Negative mg/dl      Urobilinogen, UA 0 2 E U /dl      Bilirubin, UA Negative     Blood, UA Moderate (A)     Specific [place], UA 1 015    Narrative:       CLINITEK RESULT                 CT abdomen pelvis wo contrast   Final Result by Ruchi Dunbar MD (07/12 2020)      1  Diverticulosis without evidence of diverticulitis or colitis  No bowel obstruction  2   Cholelithiasis  No evidence of cholecystitis or biliary obstruction  3   Numerous nonobstructing right renal calculi measuring between 5 and 14 mm  Nonobstructing left renal calculi measuring between 2 and 5 mm  Workstation performed: [id number]         XR chest 2 views   ED Interpretation by Polly Martins MD (07/12 2018)   No acute cardiopulmonary process  CT head without contrast   Final Result by Edilson Swanson MD (07/12 2015)      No acute intracranial abnormality  Mild chronic small vessel ischemic changes and volume loss  Small focus of encephalomalacia at the left frontoparietal junction, the sequela of prior hemorrhage  No new hemorrhage              Workstation performed: [id number]               Procedures  ECG 12 Lead Documentation  Date/Time: 7/12/2018 7:20 PM  Performed by: Antonia Castro  Authorized by: Antonia Castro     ECG reviewed by me, the ED Provider: yes    Patient location:  ED  Previous ECG:     Previous ECG:  Compared to current    Similarity:  No change  Interpretation:     Interpretation: normal    Rate:     ECG rate assessment: normal    Rhythm:     Rhythm: sinus rhythm    QRS:     QRS axis:  Normal    QRS intervals:  Normal  ST segments:     ST segments:  Normal  T waves:     T waves: normal              Phone Consults  ED Phone Contact    ED Course  ED Course as of Jul 12 2113   Thu Jul 12, 2018 1920 Leukocytes, UA: (!) Large   2311 Will give 1g IV ceftriaxone while the pt is in the ED Nitrite, UA: (!) Positive   1921 WBC: 5 36   1921 Hemoglobin: 13 3   2054 Pt failed ambulation trial as she could not stand at the side of the bed  Identification of Seniors at Risk      Most Recent Value   (ISAR) Identification of Seniors at Risk   Before the illness or injury that brought you to the Emergency, did you need someone to help you on a regular basis? 0 Filed at: 07/12/2018 1844   In the last 24 hours, have you needed more help than usual?  0 Filed at: 07/12/2018 1844   Have you been hospitalized for one or more nights during the past 6 months? 0 Filed at: 07/12/2018 1844   In general, do you see well?  0 Filed at: 07/12/2018 1844   In general, do you have serious problems with your memory? 0 Filed at: 07/12/2018 1844   Do you take more than three different medications every day? 1 Filed at: 07/12/2018 1844   ISAR Score  1 Filed at: 07/12/2018 1844                          MDM  Number of Diagnoses or Management Options  Heme positive stool:   UTI (urinary tract infection):   Weakness:   Diagnosis management comments: A 40-year-old female presenting emergency department with a urinary tract infection and ambulatory dysfunction  Treated in the emergency department with the ceftriaxone  Cardiac workup, CT of the head, CT of the abdomen, and CBC and electrolytes were unremarkable   The she was unable to ambulate the emergency department he was admitted to the [facility] service for ambulatory dysfunction likely secondary to urinary tract infection  CritCare Time    Disposition  Final diagnoses:   UTI (urinary tract infection)   Heme positive stool   Weakness   Ambulatory dysfunction     Time reflects when diagnosis was documented in both MDM as applicable and the Disposition within this note     Time User Action Codes Description Comment    7/12/2018  8:19 PM Trever, [address] [place] [address] [N39 0] UTI (urinary tract infection)     7/12/2018  8:19 PM EbonyPapa david Add [R19 5] Heme positive stool     7/12/2018  8:19 PM Papa Perera Add [R53 1] Weakness     7/12/2018  9:13 PM Papa Perera Pavy Add [R26 2] Ambulatory dysfunction       ED Disposition     ED Disposition Condition Comment    Admit  Case was discussed with MICHAELA and the patient's admission status was agreed to be Admission Status: inpatient status to the service of Dr Boubacar Hinojosa   Follow-up Information    None         Patient's Medications   Discharge Prescriptions    No medications on file     No discharge procedures on file  ED Provider  Attending physically available and evaluated Justice Walton I managed the patient along with the ED Attending      Electronically Signed by         Jovan Singer MD  07/12/18 9979

## 2018-07-13 PROBLEM — N17.9 ACUTE KIDNEY INJURY (HCC): Status: ACTIVE | Noted: 2018-07-13

## 2018-07-13 LAB
ANION GAP SERPL CALCULATED.3IONS-SCNC: 5 MMOL/L (ref 4–13)
ANION GAP SERPL CALCULATED.3IONS-SCNC: 7 MMOL/L (ref 4–13)
ATRIAL RATE: 78 BPM
BUN SERPL-MCNC: 14 MG/DL (ref 5–25)
BUN SERPL-MCNC: 17 MG/DL (ref 5–25)
CALCIUM SERPL-MCNC: 9.2 MG/DL (ref 8.3–10.1)
CALCIUM SERPL-MCNC: 9.4 MG/DL (ref 8.3–10.1)
CHLORIDE SERPL-SCNC: 104 MMOL/L (ref 100–108)
CHLORIDE SERPL-SCNC: 107 MMOL/L (ref 100–108)
CO2 SERPL-SCNC: 28 MMOL/L (ref 21–32)
CO2 SERPL-SCNC: 30 MMOL/L (ref 21–32)
CREAT SERPL-MCNC: 0.87 MG/DL (ref 0.6–1.3)
CREAT SERPL-MCNC: 0.97 MG/DL (ref 0.6–1.3)
GFR SERPL CREATININE-BSD FRML MDRD: 52 ML/MIN/1.73SQ M
GFR SERPL CREATININE-BSD FRML MDRD: 60 ML/MIN/1.73SQ M
GLUCOSE SERPL-MCNC: 124 MG/DL (ref 65–140)
GLUCOSE SERPL-MCNC: 130 MG/DL (ref 65–140)
P AXIS: 48 DEGREES
PLATELET # BLD AUTO: 158 THOUSANDS/UL (ref 149–390)
PMV BLD AUTO: 10.1 FL (ref 8.9–12.7)
POTASSIUM SERPL-SCNC: 3.6 MMOL/L (ref 3.5–5.3)
POTASSIUM SERPL-SCNC: 4 MMOL/L (ref 3.5–5.3)
PR INTERVAL: 158 MS
QRS AXIS: -29 DEGREES
QRSD INTERVAL: 70 MS
QT INTERVAL: 354 MS
QTC INTERVAL: 403 MS
SODIUM SERPL-SCNC: 139 MMOL/L (ref 136–145)
SODIUM SERPL-SCNC: 142 MMOL/L (ref 136–145)
T WAVE AXIS: 38 DEGREES
VENTRICULAR RATE: 78 BPM

## 2018-07-13 PROCEDURE — 97163 PT EVAL HIGH COMPLEX 45 MIN: CPT

## 2018-07-13 PROCEDURE — G8988 SELF CARE GOAL STATUS: HCPCS

## 2018-07-13 PROCEDURE — 80048 BASIC METABOLIC PNL TOTAL CA: CPT | Performed by: PHYSICIAN ASSISTANT

## 2018-07-13 PROCEDURE — G8979 MOBILITY GOAL STATUS: HCPCS

## 2018-07-13 PROCEDURE — 99232 SBSQ HOSP IP/OBS MODERATE 35: CPT | Performed by: PHYSICIAN ASSISTANT

## 2018-07-13 PROCEDURE — G8978 MOBILITY CURRENT STATUS: HCPCS

## 2018-07-13 PROCEDURE — G8987 SELF CARE CURRENT STATUS: HCPCS

## 2018-07-13 PROCEDURE — 99285 EMERGENCY DEPT VISIT HI MDM: CPT

## 2018-07-13 PROCEDURE — 80048 BASIC METABOLIC PNL TOTAL CA: CPT | Performed by: INTERNAL MEDICINE

## 2018-07-13 PROCEDURE — 85049 AUTOMATED PLATELET COUNT: CPT | Performed by: INTERNAL MEDICINE

## 2018-07-13 PROCEDURE — 93010 ELECTROCARDIOGRAM REPORT: CPT | Performed by: INTERNAL MEDICINE

## 2018-07-13 PROCEDURE — 97167 OT EVAL HIGH COMPLEX 60 MIN: CPT

## 2018-07-13 RX ORDER — ONDANSETRON 2 MG/ML
4 INJECTION INTRAMUSCULAR; INTRAVENOUS EVERY 6 HOURS PRN
Status: DISCONTINUED | OUTPATIENT
Start: 2018-07-13 | End: 2018-07-24

## 2018-07-13 RX ORDER — SENNOSIDES 8.6 MG
1 TABLET ORAL DAILY
Status: DISCONTINUED | OUTPATIENT
Start: 2018-07-13 | End: 2018-07-18

## 2018-07-13 RX ORDER — FUROSEMIDE 20 MG/1
20 TABLET ORAL DAILY
Status: DISCONTINUED | OUTPATIENT
Start: 2018-07-13 | End: 2018-07-17

## 2018-07-13 RX ORDER — SODIUM CHLORIDE 9 MG/ML
75 INJECTION, SOLUTION INTRAVENOUS CONTINUOUS
Status: DISCONTINUED | OUTPATIENT
Start: 2018-07-13 | End: 2018-07-13

## 2018-07-13 RX ORDER — ONDANSETRON 4 MG/1
4 TABLET, ORALLY DISINTEGRATING ORAL ONCE
Status: DISCONTINUED | OUTPATIENT
Start: 2018-07-13 | End: 2018-07-20

## 2018-07-13 RX ORDER — HEPARIN SODIUM 5000 [USP'U]/ML
5000 INJECTION, SOLUTION INTRAVENOUS; SUBCUTANEOUS EVERY 8 HOURS SCHEDULED
Status: DISCONTINUED | OUTPATIENT
Start: 2018-07-13 | End: 2018-07-26

## 2018-07-13 RX ADMIN — SODIUM CHLORIDE 75 ML/HR: 0.9 INJECTION, SOLUTION INTRAVENOUS at 03:40

## 2018-07-13 RX ADMIN — HEPARIN SODIUM 5000 UNITS: 5000 INJECTION, SOLUTION INTRAVENOUS; SUBCUTANEOUS at 06:56

## 2018-07-13 RX ADMIN — HEPARIN SODIUM 5000 UNITS: 5000 INJECTION, SOLUTION INTRAVENOUS; SUBCUTANEOUS at 14:05

## 2018-07-13 RX ADMIN — HEPARIN SODIUM 5000 UNITS: 5000 INJECTION, SOLUTION INTRAVENOUS; SUBCUTANEOUS at 21:48

## 2018-07-13 RX ADMIN — CEFTRIAXONE 1000 MG: 1 INJECTION, POWDER, FOR SOLUTION INTRAMUSCULAR; INTRAVENOUS at 20:21

## 2018-07-13 RX ADMIN — SENNOSIDES 8.6 MG: 8.6 TABLET, FILM COATED ORAL at 09:25

## 2018-07-13 RX ADMIN — FUROSEMIDE 20 MG: 20 TABLET ORAL at 09:25

## 2018-07-13 NOTE — PHYSICIAN ADVISOR
Current patient class: Inpatient  The patient is currently on Hospital Day: 2 at [address] [place] [address]      The patient was admitted to the hospital at 2111 on 7/12/18 for the following diagnosis:  UTI (urinary tract infection) [N39 0]  Weakness [R53 1]  Heme positive stool [R19 5]  Ambulatory dysfunction [R26 2]       There is documentation in the medical record of an expected length of stay of at least 2 midnights  The patient is therefore expected to satisfy the 2 midnight benchmark and given the 2 midnight presumption is appropriate for INPATIENT ADMISSION  Given this expectation of a satisfying stay, CMS instructs us that the patient is most often appropriate for inpatient admission under part A provided medical necessity is documented in the chart  After review of the relevant documentation, labs, vital signs and test results, the patient is appropriate for INPATIENT ADMISSION  Admission to the hospital as an inpatient is a complex decision making process which requires the practitioner to consider the patients presenting complaint, history and physical examination and all relevant testing  With this in mind, in this case, the patient was deemed appropriate for INPATIENT ADMISSION  After review of the documentation and testing available at the time of the admission I concur with this clinical determination of medical necessity  Rationale is as follows: The patient is a 80 yrs old Female who presented to the ED at 7/12/2018  6:40 PM with a chief complaint of Weakness - Generalized (Pt states she called ems due to weakness, states she just could not get out of her chair)     Patient admitted with a report of acute weakness and inability to walk  She has a past medical history of HTN  She was noted to have leukocytes and nitrites in her urine and was started on IV antibiotics    She will need to be seen by OT and PT as she is considered a significant fall risk and will likely need placement on discharge  It would be unsafe for this patient to be discharged at this time and a two night admission status to the hospital would be considered appropriate for her  The patients vitals on arrival were ED Triage Vitals   Temperature Pulse Respirations Blood Pressure SpO2   07/12/18 1845 07/12/18 1843 07/12/18 1843 07/12/18 1843 07/12/18 1843   99 7 °F (37 6 °C) 81 20 152/75 98 %      Temp Source Heart Rate Source Patient Position - Orthostatic VS BP Location FiO2 (%)   07/12/18 1845 07/12/18 2005 07/12/18 2005 07/12/18 2005 --   Rectal Monitor Lying Left arm       Pain Score       07/12/18 1843       No Pain           Past Medical History:   Diagnosis Date    Amaurosis fugax 11/18/2016    Arthritis     History of subarachnoid hemorrhage     Hyperlipidemia     Hypertension     Nephrolithiasis      Past Surgical History:   Procedure Laterality Date    LITHOTRIPSY      [place] FRAGMENT KIDNEY STONE/ ESWL Left 1/3/2017    Procedure: Miracle Ontiveros EXTRACORPORAL SHOCKWAVE (ESWL); Surgeon: Sasha Mendoza MD;  [facility];  Service: Urology    [place] FRAGMENT KIDNEY STONE/ ESWL Right 11/18/2016    Procedure: Kathlen Litten SHOCKWAVE (ESWL);   Surgeon: Sasha Mendoza MD;  [facility];  Service: Urology    URETERAL STENT PLACEMENT Bilateral 11/9/2016    Procedure: Roxanne Huang; Ace Pap ;  Surgeon: Sasha Mendoza MD;  [facility];  Service:            Consults have been placed to:   None    Vitals:    07/13/18 0136 07/13/18 0226 07/13/18 0812 07/13/18 1533   BP: 148/54  121/60 144/76   BP Location: Left arm  Left arm Left arm   Pulse: 81  75 75   Resp: 16  18 20   Temp: 97 5 °F (36 4 °C)  97 5 °F (36 4 °C) 97 7 °F (36 5 °C)   TempSrc: Oral  Oral Axillary   SpO2: 96%  99% 99%   Weight:  72 4 kg (159 lb 9 8 oz)         Most recent labs:    Recent Labs      07/12/18   1906  07/12/18   1936  07/13/18   0451  07/13/18   1406   WBC  5 36   --    -- --    HGB  13 3   --    --    --    HCT  40 8   --    --    --    PLT  172   --   158   --    K  3 6   --   4 0  3 6   NA  140   --   142  139   CALCIUM  9 6   --   9 2  9 4   BUN  21   --   17  14   CREATININE  1 30   --   0 97  0 87   INR   --   1 07   --    --    TROPONINI  <0 02   --    --    --    AST  17   --    --    --    ALT  22   --    --    --    ALKPHOS  88   --    --    --    BILITOT  0 41   --    --    --        Scheduled Meds:  Current Facility-Administered Medications:  cefTRIAXone 1,000 mg Intravenous Q24H Sandy Higginbotham MD   furosemide 20 mg Oral Daily Sandy Gross MD   heparin (porcine) 5,000 Units Subcutaneous Q8H [facility] Sandy Higginbotham MD   ondansetron 4 mg Intravenous Q6H PRN Sandy Higginbotham MD   ondansetron 4 mg Oral Once Anila Alicia PA-C   senna 1 tablet Oral Daily Sandy Gross MD     Continuous Infusions:   PRN Meds: ondansetron    Surgical procedures (if appropriate):

## 2018-07-13 NOTE — SOCIAL WORK
MCG Guide Used for Initial Round: Urinary Tract Infection (UTI) RRG  Optimal GLOS: 2  Hospital Day: 1 day  DC Readiness:   Discharge Readiness  Return to top of Urinary Tract Infection (UTI) RRG - ISC  · Discharge readiness is indicated by patient meeting Recovery Milestones, including ALL of the following:  ? Hemodynamic stability  ? Fever absent or reduced  ? Vomiting absent or improved  ? Urine output adequate  ? Renal function at baseline or acceptable for next level of care  ? Pain absent or managed  ? Ambulatory  ? Oral hydration, medications,[J] and diet  ?  Discharge plans and education understood    Identified Barriers: Continue IV antibiotics  Discussion Date (Time): 07/13/18 with Mildred Salinas PA-C

## 2018-07-13 NOTE — PROGRESS NOTES
Progress Note - Jimena Powers 6/5/1930, 80 y o  female MRN: [id number]    Unit/Bed#: [facility] 732-01 Encounter: [id number]    Primary Care Provider: Sharon Monsivais MD   Date and time admitted to hospital: 7/12/2018  6:40 PM      Urinary tract infection without hematuria   Assessment & Plan    · UA postive  · Pt afebrile, HDS  · C/w IV ceftriaxone, F/U urine culture  · Patient does have abdominal tenderness to palpation LLQ/suprapubic area - CT A/P reviewed which shows diverticulosis without evidence of diverticulitis or colitis, no bowel destruction  Cholelithiasis without evidence of cholecystitis or biliary obstruction  Non-obstructing right and left renal calculi  Suspect 2/2 UTI - monitor on abx  * Ambulatory dysfunction   Assessment & Plan    · 2/2 acute UTI  · PT/OT eval - recommending discharge to rehab when medically stable   · Fall precautions          Acute kidney injury [facility])   Assessment & Plan    · Cr elevated on admission from baseline of 0 8-1 0 to 1 30  · Resolved and improved with IVF, Cr today   97  · D/C IVF, monitor BMP        Weakness   Assessment & Plan    · PT/OT eval  · PT recommending discharge to rehab when stable         Hypertension   Assessment & Plan    · Resume home meds  · BP controlled          VTE Pharmacologic Prophylaxis:   Pharmacologic: Heparin  Mechanical VTE Prophylaxis in Place: No    Patient Centered Rounds: I have performed bedside rounds with nursing staff today  Discussions with Specialists or Other Care Team Provider: RN    Education and Discussions with Family / Patient: patient, patient's son, Cecy Simpson, via phone     Time Spent for Care: 30 minutes  More than 50% of total time spent on counseling and coordination of care as described above      Current Length of Stay: 1 day(s)    Current Patient Status: Inpatient   Certification Statement: The patient will continue to require additional inpatient hospital stay due to treatment for UTI, following urine cultures, need for placement upon discharge     Discharge Plan: when stable for d/c and once has placement     Code Status: Level 1 - Full Code      Subjective:   Patient states she feels miserable  States everything is wrong  Reports to occasional nausea and vomiting and vomited her up the lunch she just ate  Denies frequency/urgency/hematuria/dysuria however does have suprapubic/LLQ discomfort  Denies fevers, SOB, chest pain  Objective:     Vitals:   Temp (24hrs), Av 2 °F (36 8 °C), Min:97 5 °F (36 4 °C), Max:99 7 °F (37 6 °C)    HR:  [75-83] 75  Resp:  [16-28] 18  BP: (121-152)/(54-80) 121/60  SpO2:  [96 %-99 %] 99 %  Body mass index is 29 19 kg/m²  Input and Output Summary (last 24 hours): Intake/Output Summary (Last 24 hours) at 18 1425  Last data filed at 18 1300   Gross per 24 hour   Intake              600 ml   Output             1757 ml   Net            -1157 ml       Physical Exam:     Physical Exam   Constitutional: She is oriented to person, place, and time  She appears well-developed and well-nourished  No distress  HENT:   Head: Normocephalic and atraumatic  Mouth/Throat: No oropharyngeal exudate  Eyes: EOM are normal  Pupils are equal, round, and reactive to light  No scleral icterus  Neck: Normal range of motion  Neck supple  No thyromegaly present  Cardiovascular: Normal rate, regular rhythm and normal heart sounds  No murmur heard  Pulmonary/Chest: Effort normal and breath sounds normal  No respiratory distress  She has no wheezes  She has no rales  She exhibits no tenderness  Abdominal: Soft  Bowel sounds are normal  She exhibits no distension  There is tenderness (LLQ, suprapubic )  Musculoskeletal: Normal range of motion  She exhibits edema (mild edema b/l LE)  She exhibits no tenderness  Neurological: She is alert and oriented to person, place, and time  No cranial nerve deficit  Skin: Skin is warm and dry  She is not diaphoretic  No erythema  Psychiatric: She has a normal mood and affect  Her behavior is normal  Judgment and thought content normal    Vitals reviewed  Additional Data:     Labs:      Results from last 7 days  Lab Units 07/13/18  0451 07/12/18  1906   WBC Thousand/uL  --  5 36   HEMOGLOBIN g/dL  --  13 3   HEMATOCRIT %  --  40 8   PLATELETS Thousands/uL 158 172   NEUTROS PCT %  --  60   LYMPHS PCT %  --  28   MONOS PCT %  --  9   EOS PCT %  --  2       Results from last 7 days  Lab Units 07/13/18  0451 07/12/18  1906   SODIUM mmol/L 142 140   POTASSIUM mmol/L 4 0 3 6   CHLORIDE mmol/L 107 103   CO2 mmol/L 30 29   BUN mg/dL 17 21   CREATININE mg/dL 0 97 1 30   CALCIUM mg/dL 9 2 9 6   TOTAL PROTEIN g/dL  --  6 5   BILIRUBIN TOTAL mg/dL  --  0 41   ALK PHOS U/L  --  88   ALT U/L  --  22   AST U/L  --  17   GLUCOSE RANDOM mg/dL 124 130       Results from last 7 days  Lab Units 07/12/18  1936   INR  1 07                 * I Have Reviewed All Lab Data Listed Above  * Additional Pertinent Lab Tests Reviewed: All Labs Within Last 24 Hours Reviewed    Imaging:    Imaging Reports Reviewed Today Include: CT abdomen and pelvis   Imaging Personally Reviewed by Myself Includes:  None     Recent Cultures (last 7 days):           Last 24 Hours Medication List:     Current Facility-Administered Medications:  cefTRIAXone 1,000 mg Intravenous Q24H Sandy Higginbotham MD   furosemide 20 mg Oral Daily Sandy Rangel MD   heparin (porcine) 5,000 Units Subcutaneous Q8H [facility] Sandy Higginbotham MD   ondansetron 4 mg Intravenous Q6H PRN Sandy Higginbotham MD   ondansetron 4 mg Oral Once Anila Alicia PA-C   senna 1 tablet Oral Daily Sandy Rangel MD        Today, Patient Was Seen By: Koki Garcia PA-C    ** Please Note: Dictation voice to text software may have been used in the creation of this document   **

## 2018-07-13 NOTE — PHYSICAL THERAPY NOTE
Physical Therapy Evaluation     07/13/18 1150   Note Type   Note type Eval only   Pain Assessment   Pain Assessment FLACC   Pain Score (not rated)   Pain Type Chronic pain   Pain Location Back;Leg   Pain Orientation Lower;Bilateral   Patient's Stated Pain Goal No pain   Hospital Pain Intervention(s) Repositioned   Response to Interventions worse with mobility   Pain Rating: FLACC (Rest) - Face 0   Pain Rating: FLACC (Rest) - Legs 0   Pain Rating: FLACC (Rest) - Activity 0   Pain Rating: FLACC (Rest) - Cry 0   Pain Rating: FLACC (Rest) - Consolability 0   Score: FLACC (Rest) 0   Pain Rating: FLACC (Activity) - Face 1   Pain Rating: FLACC (Activity) - Legs 1   Pain Rating: FLACC (Activity) - Activity 1   Pain Rating: FLACC (Activity) - Cry 1   Pain Rating: FLACC (Activity) - Consolability 1   Score: FLACC (Activity) 5   Home Living   Type of Home House   Home Layout Two level; Able to live on main level with bedroom/bathroom  (2 CHARITO)   Home Equipment Walker   Prior Function   Level of [place] Independent with ADLs and functional mobility   Lives With Other (Comment)  (grandson)   Receives Help From Family   ADL Assistance Independent  (sponge bathing on her own)   IADLs Needs assistance   Falls in the last 6 months 1 to 4   Comments Patient lives with her grandson in a 2 SH w/ 2 CHARITO  Patient ambulates with a walker at home and requires assistance for IADL's  Typically, patient is independent with ADL's however, has required increased assistance the past few days  Son also lives in the area to help when needed  Restrictions/Precautions   Weight Bearing Precautions Per Order No   Other Precautions Bed Alarm; Fall Risk;Pain  (Fear of falling)   General   Family/Caregiver Present No   Cognition   Overall Cognitive Status WFL   Arousal/Participation Other (Comment)  (Fearful of movement)   Orientation Level Oriented X4   Memory Unable to assess   Following Commands Follows one step commands with increased time or repetition   Comments Patient expressed she has fallen at home and is very fearful to fall again  RLE Assessment   RLE Assessment (grossly assessed with mobility 3+/5)   LLE Assessment   LLE Assessment (grossly assessed with mobility 3+/5)   Coordination   Movements are Fluid and Coordinated 0   Coordination and Movement Description increased weakness and fatigue   Sensation WFL   Bed Mobility   Supine to Sit 2  Maximal assistance   Additional items Assist x 1;HOB elevated; Increased time required;Verbal cues;LE management   Sit to Supine 2  Maximal assistance   Additional items Assist x 2; Increased time required;Verbal cues;LE management   Additional Comments Patient found in supine upon arrival  Max Ax2 required for supine to sit transfer  Throughout transfer, patient was fearful of falling with increased anxiety  Once EOB, patient maintained sitting for 5 min with Mod A x1 from the back  Further mobilty was not assessed as this time due to decreased sitting balance  Transfers   Additional Comments Unable to assess due to decreased sitting balance   Balance   Static Sitting Poor +   Dynamic Sitting Poor   Endurance Deficit   Endurance Deficit Yes   Endurance Deficit Description due to weakness and fatigue   Activity Tolerance   Activity Tolerance Patient limited by fatigue;Patient limited by pain   Medical Staff Made Aware Elaine REEDER   Nurse Made Aware Yes, An   Assessment   Prognosis Fair   Problem List Decreased strength;Decreased range of motion;Decreased endurance; Impaired balance;Decreased mobility; Decreased coordination;Pain   Assessment Patient seen today for a high complexity physical therapy evaluation  Patient is an 80year old female with pmh including amaurosis fugax, HLD, HTN and a subarachnoid hemorrhage last February  Patient now presents to [facility] on 7/12/18 w/ generalized weakness and difficulty getting out of chair at home  Typically, patient is independent with ALD's and functional mobility  Patient lives with her grandson in a 2  with 2 CHARITO and a first floor set up  Per patient report, patient is able to do ADL's independently however, grandson and son are available for assistance as needed  Patient ambulates with a walker at home and however, states she has had increased difficulty with ambulation  Dx  includes ambulatory dysfunction with XR and CT negative for acute abnormality  Physical therapy was consulted to assess mobility and discharge recommendation  Upon arrival, patient was supine in bed and agreeable to therapy session  Patient required max Ax2 for supine to sit transfer  Throughout transfer, patient became very anxious and expressed an increased fear of falling due to previous falls at home  Patient able to maintain EOB sitting for 5 min with mod A x1 from the back  Further mobility was not assessed at time of evaluation due to decreased sitting balance  Patient presents with decreased BLE strength, impaired static/dynamic balance, decreased activity tolerance and endurance, increased fear of movement and decreased mobility  Due to unstable medical status and decline in functional mobility, patient will benefit from skilled physical therapy to address listed impairments  Recommend discharge to rehab when medically stable  Goals   Patient Goals to go to rehab   STG Expiration Date 07/27/18   Short Term Goal #1 1-2 wks: (1) perform bed mobility including R/L roll w/ min A x1 (2) perform supine to sit transfer w/ min A x1 (3) improve BLE strength by 1/2-1 grade (4) improve sitting balance to fair+/good (5) improve activity tolerance to 20-25 min (6) participate in therapeutic exercises addressing BLE strength, static/dynamic balance, activity tolerance and functional mobility  (7) participate in further gait mobility with PT once able  Treatment Day 0   Plan   Treatment/Interventions Functional transfer training;LE strengthening/ROM; Therapeutic exercise; Endurance training;Patient/family training;Equipment eval/education; Bed mobility   PT Frequency 2-3x/wk   Recommendation   Recommendation (to rehab when medically stable )   Equipment Recommended Walker  (RW)   PT - OK to Discharge Yes  (to rehab when medically stable )   Additional Comments Patient left supine in bed with bed pan and call bell in lap      Modified [place] Scale   Modified Evelin Scale 4   Barthel Index   Feeding 10   Bathing 0   Grooming Score 0   Dressing Score 0   Bladder Score 10   Bowels Score 10   Toilet Use Score 5   Transfers (Bed/Chair) Score 5   Mobility (Level Surface) Score 0   Stairs Score 0   Barthel Index Score 40     [employer], SPT

## 2018-07-13 NOTE — ASSESSMENT & PLAN NOTE
· Cr elevated on admission from baseline of 0 8-1 0 to 1 30  · Resolved and improved with IVF, Cr today   97  · D/C IVF, monitor BMP

## 2018-07-13 NOTE — OCCUPATIONAL THERAPY NOTE
[address] Evaluation     Patient Name: Mukesh Cotton  BCGOW'F Date: 7/13/2018  Problem List  Patient Active Problem List   Diagnosis    Nephrolithiasis    Hypertension    Hyperbilirubinemia    Hyperlipidemia    Amaurosis fugax    Hearing loss    Subarachnoid hemorrhage (HCC)    Tinea pedis    Vitamin D deficiency    Subdural hematoma (HCC)    Lumbar disc disease    Fall    Bilateral shoulder pain    Peripheral edema    Weakness    Ambulatory dysfunction    Urinary tract infection without hematuria    [facility] kidney [facility])     Past Medical History  Past Medical History:   Diagnosis Date    Amaurosis fugax 11/18/2016    Arthritis     History of subarachnoid hemorrhage     Hyperlipidemia     Hypertension     Nephrolithiasis      Past Surgical History  Past Surgical History:   Procedure Laterality Date    LITHOTRIPSY      W[place] FRAGMENT KIDNEY STONE/ ESWL Left 1/3/2017    Procedure: LITHROTRIPSY EXTRACORPORAL SHOCKWAVE (ESWL); Surgeon: Javed Valdivia MD;  [facility];  Service: Urology    W[place] FRAGMENT KIDNEY STONE/ ESWL Right 11/18/2016    Procedure: Pooja Deutschit SHOCKWAVE (ESWL); Surgeon: Javed Valdivia MD;  [facility];  Service: Urology    URETERAL STENT PLACEMENT Bilateral 11/9/2016    Procedure: Omar Gutierrez; STENT INSERTION ;  Surgeon: Javed Valdivia MD;  Location[facility];  Service:          07/13/18 1148   Note Type   Note type Eval/Treat   Restrictions/Precautions   Weight Bearing Precautions Per Order No   Other Precautions Fall Risk;Pain   Pain Assessment   Pain Assessment 0-10   Pain Score Worst Possible Pain   Hospital Pain Intervention(s) Repositioned; Emotional support   Response to Interventions increased w/ mobility   Home Living   Type of [address] [place] [address] Two level; Able to live on main level with bedroom/bathroom  (2 CHARITO w/ 1st floor s/u)   Bathroom Shower/Tub Tub/shower unit   Bathroom Toilet Standard   Bathroom [address]   Prior Function   Level of [place] Independent with ADLs and functional mobility   Lives With Family  (grandson)   Receives Help From Family   ADL Assistance Independent   IADLs Needs assistance   Falls in the last 6 months 1 to 4   Vocational Retired   Lifestyle   Autonomy Pt is I w/ ADLs, however she reports she has required increased assistance in the days PTA  Pt receives assist for IADLs and ambulates w/ RW   Reciprocal Relationships Pt lives w/ her grandson who works overnight  Her son lives nearby and assists as needed  Service to Others Retired   Intrinsic Gratification Enjoys [address] (WDL) WDL   ADL   Where Assessed Edge of bed   Eating Assistance 5  Supervision/Setup   Eating Deficit Setup   Grooming Assistance 4  Minimal Assistance   [address] 3  Moderate Assistance   LB [address] 2  Maximal Assistance   [address] 3  Moderate Assistance   LB Dressing Assistance 2  Maximal [address]  2  Maximal Assistance   Functional Assistance 2  Maximal Assistance   Bed Mobility   Supine to Sit 2  Maximal assistance   Additional items Assist x 1   Sit to Supine 2  Maximal assistance   Additional items Assist x 2   Additional Comments Pt transferred to EOB w/ Ax2  Pt very fearful of falling and reported increased pain with change of position  Pt required mod Ax1 to maintain sitting balance for approx 5 minutes  Transfers   Sit to Stand Unable to assess   Additional Comments Unable to assess sit <->stand at this time 2* pt's fear of falling, pain, and decreased sitting balance EOB      Balance   Static Sitting Poor   Dynamic Sitting Poor -   Activity Tolerance   Activity Tolerance Patient limited by fatigue;Patient limited by pain   Medical Staff Made Aware PT student- Demetra Chaparro   Nurse Made Aware Yes, An   RUE Assessment   RUE Assessment X  (1/2 AROM)   LUE Assessment   LUE Assessment X  (1/2 AROM)   Hand Function   Gross Motor Coordination Functional   Fine Motor Coordination Functional   Cognition   Overall Cognitive Status WFL   Arousal/Participation Responsive; Cooperative   Attention Attends with cues to redirect   Orientation Level Oriented X4   Memory Within functional limits   Following Commands Follows one step commands with increased time or repetition   Comments Pt pleasant and agreeable to session, however she is very fearful of falling and required encouragement to sit EOB  Pt reports, "my grandson should send me back to Son and leave me there  I won't be able to go home again " Pt required emotional support t/o session  Assessment   Limitation Decreased ADL status; Decreased endurance;Decreased self-care trans;Decreased high-level ADLs   Prognosis Good   Assessment Pt is a 80 y o  female who was [facility] to [facility] on 7/12/2018 with Ambulatory dysfunction  Pt's comorbidities include UTI, weakness, HTN, arthritis, hx of subarachnoid hemorrhage, HLD, HTN, nephrolithiasis, hx of falls, B/L shoulder pain, and lumbar disc disease  At baseline pt was I w/ ADLs and mobility w/ RW  Pt's grandson and son assisted w/ IADLs  Pt lives in 2 [facility] w/ grandson and has a 1st floor s/u  Currently pt requires mod-max A for overall ADLS and max Ax2 for bed mobility  Unable to assess sit <->stand transfer at this time 2* pt's decreased sitting balance EOB, increased pain, and fear of falling  Pt currently presents with impairments in the following categories -difficulty performing ADLS, difficulty performing IADLS,  activity tolerance, endurance, standing balance/tolerance, sitting balance/tolerance and fear of falling   These impairments, as well as pt's fatigue, pain, decreased caregiver support and risk for falls limit pt's ability to safely engage in all baseline areas of occupation, including grooming, bathing, dressing, toileting, functional mobility/transfers and leisure activities  From OT standpoint, recommend inpatient rehab upon D/C  OT will continue to follow to address the below stated goals  Goals   Patient Goals to go to rehab    LTG Time Frame 10-14   Long Term Goal see below goals    Plan   Treatment Interventions ADL retraining;Functional transfer training;UE strengthening/ROM; Endurance training;Patient/family training;Equipment evaluation/education; Compensatory technique education;Continued evaluation; Energy conservation; Activityengagement   Goal Expiration Date 07/27/18   OT Frequency 3-5x/wk   Recommendation   OT Discharge Recommendation Short Term Rehab   OT - OK to Discharge Yes  (when medically stable)   Barthel Index   Feeding 10   Bathing 0   Grooming Score 0   Dressing Score 0   Bladder Score 10   Bowels Score 10   Toilet Use Score 5   Transfers (Bed/Chair) Score 5   Mobility (Level Surface) Score 0   Stairs Score 0   Barthel Index Score 40   Modified [place] Scale   Modified Evelin Scale 4     LTG (10-14 DAYS)    Pt will perform all ADL's w/ S while seated EOB or in chair with G balance and DME/AE as needed      Pt will improve bed mobility to S level to increase independence w/ EOB activities and ADLs  Pt will tolerate sitting EOB w/ SBA for 10-15 minutes for participation in purposeful activity  Pt will perform toileting at a min Ax1 level for f/u/thoroughness       Pt will improve activity tolerance to G for 30-45 min treatment session      Pt will demonstrate good carry over of pt/family education and training w/ 100% attention to task to assist w/ safe d/c planning      Pt will participate in further assessment of func mobility/transfers as a prerequisite for participation in ADLs          Vel Schroeder, OTR/L

## 2018-07-13 NOTE — ASSESSMENT & PLAN NOTE
· UA postive  · Pt afebrile, HDS  · C/w IV ceftriaxone, F/U urine culture  · Patient does have abdominal tenderness to palpation LLQ/suprapubic area - CT A/P reviewed which shows diverticulosis without evidence of diverticulitis or colitis, no bowel destruction  Cholelithiasis without evidence of cholecystitis or biliary obstruction  Non-obstructing right and left renal calculi  Suspect 2/2 UTI - monitor on abx

## 2018-07-13 NOTE — PLAN OF CARE
Problem: PHYSICAL THERAPY ADULT  Goal: Performs mobility at highest level of function for planned discharge setting  See evaluation for individualized goals  Treatment/Interventions: Functional transfer training, LE strengthening/ROM, Therapeutic exercise, Endurance training, Patient/family training, Equipment eval/education, Bed mobility  Equipment Recommended: Walker (RW)       See flowsheet documentation for full assessment, interventions and recommendations  Prognosis: Fair  Problem List: Decreased strength, Decreased range of motion, Decreased endurance, Impaired balance, Decreased mobility, Decreased coordination, Pain  Assessment: Patient seen today for a high complexity physical therapy evaluation  Patient is an 80year old female with pmh including amaurosis fugax, HLD, HTN and a subarachnoid hemorrhage last February  [facility] w/ generalized weakness and difficulty getting out of chair at home  Typically, patient is independent with ALD's and functional mobility  Patient lives with her grandson in a 2  with 2 CHARITO and a first floor set up  Per patient report, patient is able to do ADL's independently however, grandson and son are available for assistance as needed  Patient ambulates with a walker at home and however, states she has had increased difficulty with ambulation  Dx  includes ambulatory dysfunction with XR and CT negative for acute abnormality  Physical therapy was consulted to assess mobility and discharge recommendation  Upon arrival, patient was supine in bed and agreeable to therapy session  Patient required max Ax2 for supine to sit transfer  Throughout transfer, patient became very anxious and expressed an increased fear of falling due to previous falls at home  Patient able to maintain EOB sitting for 5 min with mod A x1 from the back  Further mobility was not assessed at time of evaluation due to decreased sitting balance   Patient presents with decreased BLE strength, impaired static/dynamic balance, decreased activity tolerance and endurance, increased fear of movement and decreased mobility  Due to unstable medical status and decline in functional mobility, patient will benefit from skilled physical therapy to address listed impairments  Recommend discharge to rehab when medically stable  Recommendation:  (to rehab when medically stable )     PT - OK to Discharge: (S) Yes (to rehab when medically stable )    See flowsheet documentation for full assessment

## 2018-07-13 NOTE — PLAN OF CARE
Problem: OCCUPATIONAL THERAPY ADULT  Goal: Performs self-care activities at highest level of function for planned discharge setting  See evaluation for individualized goals  Treatment Interventions: ADL retraining, Functional transfer training, UE strengthening/ROM, Endurance training, Patient/family training, Equipment evaluation/education, Compensatory technique education, Continued evaluation, Energy conservation, Activityengagement          See flowsheet documentation for full assessment, interventions and recommendations  Limitation: Decreased ADL status, Decreased endurance, Decreased self-care trans, Decreased high-level ADLs  Prognosis: Good  Assessment: Pt is a 80 y o  female who was admitted to [facility] on 7/12/2018 with Ambulatory dysfunction  Pt's comorbidities include UTI, weakness, HTN, arthritis, hx of subarachnoid hemorrhage, HLD, HTN, nephrolithiasis, hx of falls, B/L shoulder pain, and lumbar disc disease  At baseline pt was I w/ ADLs and mobility w/ RW  Pt's grandson and son assisted w/ IADLs  Pt lives in [address] [place] [address] w/ grandson and has a 1st floor s/u  Currently pt requires mod-max A for overall ADLS and max Ax2 for bed mobility  Unable to assess sit <->stand transfer at this time 2* pt's decreased sitting balance EOB, increased pain, and fear of falling  Pt currently presents with impairments in the following categories -difficulty performing ADLS, difficulty performing IADLS,  activity tolerance, endurance, standing balance/tolerance, sitting balance/tolerance and fear of falling  These impairments, as well as pt's fatigue, pain, decreased caregiver support and risk for falls limit pt's ability to safely engage in all baseline areas of occupation, including grooming, bathing, dressing, toileting, functional mobility/transfers and leisure activities  From OT standpoint, recommend inpatient rehab upon D/C  OT will continue to follow to address the below stated goals        OT Discharge Recommendation: Short Term Rehab  OT - OK to Discharge: Yes (when medically stable)

## 2018-07-13 NOTE — ASSESSMENT & PLAN NOTE
· 2/2 acute UTI  · PT/OT eval - recommending discharge to rehab when medically stable   · Fall precautions

## 2018-07-13 NOTE — CASE MANAGEMENT
Initial Clinical Review    Admission: Date/Time/Statement: 7/12/18 @ 2111     Orders Placed This Encounter   Procedures    Inpatient Admission (expected length of stay for this patient is greater than two midnights)     Standing Status:   Standing     Number of Occurrences:   1     Order Specific Question:   Admitting Physician     Answer:   Monika Orellana     Order Specific Question:   Level of Care     Answer:   Med Surg [16]     Order Specific Question:   Estimated length of stay     Answer:   More than 2 Midnights     Order Specific Question:   Certification     Answer:   I certify that inpatient services are medically necessary for this patient for a duration of greater than two midnights  See H&P and MD Progress Notes for additional information about the patient's course of treatment  ED: Date/Time/Mode of Arrival:   ED Arrival Information     Expected Arrival Acuity Means of Arrival Escorted By Service Admission Type    - 7/12/2018 18:39 Urgent Walk-In Self General Medicine Urgent    Arrival Complaint    Weakness          Chief Complaint:   Chief Complaint   Patient presents with    Weakness - Generalized     Pt states she called ems due to weakness, states she just could not get out of her chair       History of Illness:      Ramesh Feldman is a 80 y o  female who presents with acute weakness and inability to walk  She had a history of hypertension and chronic lower extremity edema for which she takes Lasix and has been in good health prior to yesterday  Patient reports that she is independent in all activities of daily living and lives with her grandson  Patient reports that today she progressively felt weaker and at dinner time was unable to get up from her chair and kept slipping down         ED Vital Signs:   ED Triage Vitals   Temperature Pulse Respirations Blood Pressure SpO2   07/12/18 1845 07/12/18 1843 07/12/18 1843 07/12/18 1843 07/12/18 1843   99 7 °F (37 6 °C) 81 20 152/75 98 %      No Pain       07/13/18 72 4 kg (159 lb 9 8 oz)       Vital Signs (abnormal):     07/12/18 2005  --  81   27  142/71  99 %  None (Room air)  Lying   07/12/18 1845  99 7 °F (37 6 °C)  80   28  --  --  --             Abnormal Labs/Diagnostic Test Results:     Clean Catch     RBC, UA 4-10 (A) /hpf    WBC, UA Innumerable (A) /hpf    Epithelial Cells Moderate (A) /hpf    Bacteria, UA Innumerable (A) /hpf       Urine     Color, UA Yellow    Clarity, UA Cloudy    pH, UA 6 0    Leukocytes, UA Large (A)    Nitrite, UA Positive (A)    Protein, UA 30 (1+) (A) mg/dl    Glucose, UA Negative mg/dl    Ketones, UA Negative mg/dl    Urobilinogen, UA 0 2 E U /dl    Bilirubin, UA Negative    Blood, UA Moderate (A)       ED Treatment:   Medication Administration from 07/12/2018 1838 to 07/13/2018 0222       Date/Time Order Dose Route     07/12/2018 2049 ceftriaxone (ROCEPHIN) 1 g/50 mL in dextrose IVPB 1,000 mg Intravenous       Past Medical/Surgical History:    Nephrolithiasis 06/17/2016    Hypertension 06/17/2016    Hyperbilirubinemia 06/21/2016    Hyperlipidemia     Amaurosis fugax 11/18/2016    Hearing loss 11/18/2016    Subarach[facility]oid hemorrhage ([facility] [address] ) 11/18/2016    Tinea pedis 11/18/2016    Vitamin D deficiency 11/18/2016    Subdural hematoma (HCC) 02/09/2018    Lumbar disc disease 02/10/2018    Fall 02/10/2018    Bilateral shoulder pain 05/06/2016    Peripheral edema 04/15/2018           Admitting Diagnosis: UTI (urinary tract infection) [N39 0]  Weakness [R53 1]  Heme positive stool [R19 5]  Ambulatory dysfunction [R26 2]    Age/Sex: 80 y o  female    Assessment/Plan:     Ambulatory dysfunction   Assessment & Plan     2/2 acute UTI  OOBTC  PT/Ot eval  Fall precautions             Urinary tract infection without hematuria   Assessment & Plan     UA postive  Pt afebrile, HDS  C/w IV ceftriaxone while inpt           Admission Orders:    ORTHOSTATIC BP  PT AND OT EVAL AND TREAT  SEQUENTIAL COMPRESSION DEVICE         Scheduled Meds:   Current Facility-Administered Medications:  cefTRIAXone 1,000 mg Intravenous Q24H   furosemide 20 mg Oral Daily   heparin (porcine) 5,000 Units Subcutaneous Q8H [address]   ondansetron 4 mg Intravenous Q6H PRN   ondansetron 4 mg Oral Once   senna 1 tablet Oral Daily     Continuous Infusions:    PRN Meds: ondansetron

## 2018-07-13 NOTE — PLAN OF CARE
GENITOURINARY - ADULT     Maintains or returns to baseline urinary function Progressing     Absence of urinary retention Progressing        METABOLIC, FLUID AND ELECTROLYTES - ADULT     Electrolytes maintained within normal limits Progressing     Fluid balance maintained Progressing        Potential for Falls     Patient will remain free of falls Progressing        Prexisting or High Potential for Compromised Skin Integrity     Skin integrity is maintained or improved Progressing

## 2018-07-13 NOTE — H&P
H&P- Abhijeet Graves 6/5/1930, 80 y o  female MRN: [id number]    Unit/Bed#: ED 03 Encounter: [id number]    Primary Care Provider: Deven Aquino MD   Date and time admitted to hospital: 7/12/2018  6:40 PM        * Ambulatory dysfunction   Assessment & Plan    2/2 acute UTI  OOBTC  PT/Ot eval  Fall precautions          Urinary tract infection without hematuria   Assessment & Plan    UA postive  Pt afebrile, HDS  C/w IV ceftriaxone while inpt        Weakness   Assessment & Plan    PT/OT eval        Hypertension   Assessment & Plan    Resume home meds  BP controlled              VTE Prophylaxis: Heparin  / sequential compression device   Code Status: full      Anticipated Length of Stay:  Patient will be admitted on an Inpatient basis with an anticipated length of stay of  2 midnights  Justification for Hospital Stay:  Workup and treatment of ambulatory dysfunction and UTI  Total Time for Visit, including Counseling / Coordination of Care: 1 hour  Greater than 50% of this total time spent on direct patient counseling and coordination of care  Chief Complaint:   Weak  Could not walk  History of Present Illness:    Abhijeet Graves is a 80 y o  female who presents with acute weakness and inability to walk  She had a history of hypertension and chronic lower extremity edema for which she takes Lasix and has been in good health prior to yesterday  Patient reports that she is independent in all activities of daily living and lives with her grandson  Patient reports that today she progressively felt weaker and at dinner time was unable to get up from her chair and kept slipping down  Patient did not fall, lose consciousness  Denied any fevers, chills, nausea, vomiting, diarrhea, dysuria  Review of Systems:    12 point review of systems is grossly negative unless as stated above in HPI      Past Medical and Surgical History:     Past Medical History:   Diagnosis Date    Amaurosis fugax 11/18/2016    Arthritis     History of subarachnoid hemorrhage     Hyperlipidemia     Hypertension     Nephrolithiasis        Past Surgical History:   Procedure Laterality Date    LITHOTRIPSY      AL FRAGMENT KIDNEY STONE/ ESWL Left 1/3/2017    Procedure: LITHROTRIPSY EXTRACORPORAL SHOCKWAVE (ESWL); Surgeon: Khloe Acharya MD;  [facility]: [facility];  Service: Urology    AL FRAGMENT KIDNEY STONE/ ESWL Right 11/18/2016    Procedure: Joslyn Rinne SHOCKWAVE (ESWL); Surgeon: Khloe Acharya MD;  [facility];  Service: Urology    URETERAL STENT PLACEMENT Bilateral 11/9/2016    Procedure: Emili Naqviparminder; STENT INSERTION ;  Surgeon: Khloe Acharya MD;  Location[facility];  Service:        Meds/Allergies:    Prior to Admission medications    Medication Sig Start Date End Date Taking? Authorizing Provider   acetaminophen (TYLENOL) 325 mg tablet Take 2 tablets by mouth every 6 (six) hours as needed for mild pain  Patient taking differently: Take 325 mg by mouth every 6 (six) hours as needed for mild pain   11/11/16  Yes Hubert Laguna MD   calcium-vitamin D (OSCAL 500 + D) 500 mg-200 units per tablet Take 1 tablet by mouth daily  1000 iu   Yes Historical Provider, MD   furosemide (LASIX) 20 mg tablet Take 1 tablet (20 mg total) by mouth daily 5/9/17  Yes John Hairston MD   Multiple Vitamin (MULTIVITAMIN) tablet Take 1 tablet by mouth daily     Yes Historical Provider, MD   potassium chloride (K-DUR) 10 mEq tablet Take 1 tablet (10 mEq total) by mouth daily 7/1/17  Yes John Hairston MD   simvastatin (ZOCOR) 40 mg tablet Take 1 tablet (40 mg total) by mouth daily at bedtime 0/16/66  Yes John Hairston MD   magnesium hydroxide (MILK OF MAGNESIA) 400 mg/5 mL oral suspension Take 30 mL by mouth daily as needed for constipation    Historical Provider, MD   polyethylene glycol (MIRALAX) 17 g packet Take 17 g by mouth daily for 7 days 11/11/16 2/26/18  Hubert Laguna MD   senna (SENOKOT) 8 6 mg Take 1 tablet by mouth 2 (two) times a day for 7 days  Patient taking differently: Take 1 tablet by mouth daily   11/11/16 2/26/18  Kurt Wheeler MD   diclofenac sodium (VOLTAREN) 1 % Apply 2 g topically 4 (four) times a day 4/13/18 3/22/76  Kailee Sosa MD     I have reviewed home medications with patient personally  Allergies: No Known Allergies    Social History:     Marital Status:    Occupation:  Retired  Patient Pre-hospital Living Situation:  Lives at home with her grandson  Patient Pre-hospital Level of Mobility:  Independent in her ADLs  Patient Pre-hospital Diet Restrictions:  Low-salt diet  Substance Use History:   History   Alcohol Use No     History   Smoking Status    Never Smoker   Smokeless Tobacco    Never Used     History   Drug Use No       Family History:    Family History   Problem Relation Age of Onset    Pneumonia Mother     Cancer Sister     Prostate cancer Brother        Physical Exam:     Vitals:   Blood Pressure: 130/59 (07/12/18 2115)  Pulse: 82 (07/12/18 2115)  Temperature: 99 7 °F (37 6 °C) (07/12/18 1845)  Temp Source: Rectal (07/12/18 1845)  Respirations: 16 (07/12/18 2115)  Weight - Scale: 69 9 kg (154 lb 1 6 oz) (07/12/18 1843)  SpO2: 99 % (07/12/18 2115)    General:  Elderly female in No apparent distress  Comfortable  HEENT:  EOMI  Mucous membranes moist   Dentures in place  Cardiovascular:  S1-S2  Pulmonary: Clear to auscultation b/l  No rales, rhonchi   Abdomen: soft, NT/ND  BS +ve  Extremities: FROM  No cyanosis, 1+ bilateral lower extremity edema  Skin: warm, dry, intact  Neurological: Alert and oriented x 3  No focal deficits  Psychiatric: Appropriate mood and affect      Additional Data:     Lab Results: I have personally reviewed pertinent reports          Results from last 7 days  Lab Units 07/12/18  1906   WBC Thousand/uL 5 36   HEMOGLOBIN g/dL 13 3   HEMATOCRIT % 40 8   PLATELETS Thousands/uL 172   NEUTROS PCT % 60   LYMPHS PCT % 28   MONOS PCT % 9   EOS PCT % 2       Results from last 7 days  Lab Units 07/12/18  1906   SODIUM mmol/L 140   POTASSIUM mmol/L 3 6   CHLORIDE mmol/L 103   CO2 mmol/L 29   BUN mg/dL 21   CREATININE mg/dL 1 30   CALCIUM mg/dL 9 6   TOTAL PROTEIN g/dL 6 5   BILIRUBIN TOTAL mg/dL 0 41   ALK PHOS U/L 88   ALT U/L 22   AST U/L 17   GLUCOSE RANDOM mg/dL 130       Results from last 7 days  Lab Units 07/12/18  1936   INR  1 07       Imaging: I have personally reviewed pertinent reports  Ct Abdomen Pelvis Wo Contrast    Result Date: 7/12/2018  Narrative: CT ABDOMEN AND PELVIS WITHOUT IV CONTRAST INDICATION:   Weakness  COMPARISON: 11/7/2016  TECHNIQUE:  CT examination of the abdomen and pelvis was performed without intravenous contrast   Axial, sagittal, and coronal 2D reformatted images were created from the source data and submitted for interpretation  Radiation dose length product (DLP) for this visit:  706 81 mGy-cm   This examination, like all CT scans performed in the [facility], was performed utilizing techniques to minimize radiation dose exposure, including the use of iterative  reconstruction and automated exposure control  Enteric contrast was administered  FINDINGS: ABDOMEN LOWER CHEST:  No clinically significant abnormality identified in the visualized lower chest  LIVER/BILIARY TREE:  No biliary obstruction  GALLBLADDER:  Punctate gallstones in the dependent portion of the fundus  No evidence of acute cholecystitis  SPLEEN:  Unremarkable  PANCREAS:  Unremarkable  ADRENAL GLANDS:  Unremarkable  KIDNEYS/URETERS:  Nonobstructing right renal calculi measuring between 4 and 14 mm  Nonobstructing left renal calculi measuring between 2 and 5 mm  Wojciech Mary STOMACH AND BOWEL:  Diverticulosis without diverticulitis or colitis  No bowel obstruction  APPENDIX:  A normal appendix was visualized  ABDOMINOPELVIC CAVITY:  No ascites or free intraperitoneal air  No lymphadenopathy  VESSELS:  Calcified plaque in the abdominal aorta  No aneurysm   PELVIS REPRODUCTIVE ORGANS:  No adnexal mass or cyst or fluid in the pelvis  URINARY BLADDER:  Unremarkable  ABDOMINAL WALL/INGUINAL REGIONS:  Unremarkable  OSSEOUS STRUCTURES: Moderate to severe degenerative change throughout the lumbar spine  Lumbar levoscoliosis No acute fracture or destructive osseous lesion  Impression: 1  Diverticulosis without evidence of diverticulitis or colitis  No bowel obstruction  2   Cholelithiasis  No evidence of cholecystitis or biliary obstruction  3   Numerous nonobstructing right renal calculi measuring between 5 and 14 mm  Nonobstructing left renal calculi measuring between 2 and 5 mm  Workstation performed: [id number]     Ct Head Without Contrast    Result Date: 7/12/2018  Narrative: CT BRAIN - WITHOUT CONTRAST INDICATION:   weakness, h/o subarachnoid  COMPARISON:  CT brain dated March 19, 2018  TECHNIQUE:  CT examination of the brain was performed  In addition to axial images, coronal 2D reformatted images were created and submitted for interpretation  Radiation dose length product (DLP) for this visit:  966 35 mGy-cm   This examination, like all CT scans performed in the [facility], was performed utilizing techniques to minimize radiation dose exposure, including the use of iterative  reconstruction and automated exposure control  IMAGE QUALITY:  Diagnostic  FINDINGS: PARENCHYMA:  No intracranial mass, mass effect or midline shift  No CT signs of acute infarction  No acute parenchymal hemorrhage  There is a small focus of encephalomalacia at the posterior left parietal convexity, the sequela of prior hemorrhage  There is mild periventricular white matter low attenuation which is nonspecific and most likely related to chronic small vessel ischemic changes  VENTRICLES AND EXTRA-AXIAL SPACES:  There is prominence of the ventricles and sulci related to mild volume loss    Again noted is a calcified left anterior falx 1 3 cm lesion likely representing a calcified meningioma  VISUALIZED ORBITS AND PARANASAL SINUSES:  Unremarkable  CALVARIUM AND EXTRACRANIAL SOFT TISSUES:  Normal      Impression: No acute intracranial abnormality  Mild chronic small vessel ischemic changes and volume loss  Small focus of encephalomalacia at the left frontoparietal junction, the sequela of prior hemorrhage  No new hemorrhage  Workstation performed: [id number]       EKG, Pathology, and Other Studies Reviewed on Admission:   · EKG:  Reviewed    Allscripts / Epic Records Reviewed: Yes     ** Please Note: This note has been constructed using a voice recognition system   **

## 2018-07-14 LAB
ANION GAP SERPL CALCULATED.3IONS-SCNC: 7 MMOL/L (ref 4–13)
BACTERIA UR CULT: ABNORMAL
BASOPHILS # BLD AUTO: 0.03 THOUSANDS/ΜL (ref 0–0.1)
BASOPHILS NFR BLD AUTO: 1 % (ref 0–1)
BUN SERPL-MCNC: 15 MG/DL (ref 5–25)
CALCIUM SERPL-MCNC: 9 MG/DL (ref 8.3–10.1)
CHLORIDE SERPL-SCNC: 108 MMOL/L (ref 100–108)
CO2 SERPL-SCNC: 28 MMOL/L (ref 21–32)
CREAT SERPL-MCNC: 0.94 MG/DL (ref 0.6–1.3)
EOSINOPHIL # BLD AUTO: 0.08 THOUSAND/ΜL (ref 0–0.61)
EOSINOPHIL NFR BLD AUTO: 1 % (ref 0–6)
ERYTHROCYTE [DISTWIDTH] IN BLOOD BY AUTOMATED COUNT: 14.2 % (ref 11.6–15.1)
GFR SERPL CREATININE-BSD FRML MDRD: 54 ML/MIN/1.73SQ M
GLUCOSE SERPL-MCNC: 107 MG/DL (ref 65–140)
HCT VFR BLD AUTO: 44.4 % (ref 34.8–46.1)
HGB BLD-MCNC: 13.9 G/DL (ref 11.5–15.4)
IMM GRANULOCYTES # BLD AUTO: 0.01 THOUSAND/UL (ref 0–0.2)
IMM GRANULOCYTES NFR BLD AUTO: 0 % (ref 0–2)
LYMPHOCYTES # BLD AUTO: 1.48 THOUSANDS/ΜL (ref 0.6–4.47)
LYMPHOCYTES NFR BLD AUTO: 24 % (ref 14–44)
MCH RBC QN AUTO: 29.7 PG (ref 26.8–34.3)
MCHC RBC AUTO-ENTMCNC: 31.3 G/DL (ref 31.4–37.4)
MCV RBC AUTO: 95 FL (ref 82–98)
MONOCYTES # BLD AUTO: 0.63 THOUSAND/ΜL (ref 0.17–1.22)
MONOCYTES NFR BLD AUTO: 10 % (ref 4–12)
NEUTROPHILS # BLD AUTO: 3.96 THOUSANDS/ΜL (ref 1.85–7.62)
NEUTS SEG NFR BLD AUTO: 64 % (ref 43–75)
NRBC BLD AUTO-RTO: 0 /100 WBCS
PLATELET # BLD AUTO: 165 THOUSANDS/UL (ref 149–390)
PMV BLD AUTO: 10 FL (ref 8.9–12.7)
POTASSIUM SERPL-SCNC: 3.6 MMOL/L (ref 3.5–5.3)
RBC # BLD AUTO: 4.68 MILLION/UL (ref 3.81–5.12)
SODIUM SERPL-SCNC: 143 MMOL/L (ref 136–145)
WBC # BLD AUTO: 6.19 THOUSAND/UL (ref 4.31–10.16)

## 2018-07-14 PROCEDURE — 80048 BASIC METABOLIC PNL TOTAL CA: CPT | Performed by: PHYSICIAN ASSISTANT

## 2018-07-14 PROCEDURE — 85025 COMPLETE CBC W/AUTO DIFF WBC: CPT | Performed by: PHYSICIAN ASSISTANT

## 2018-07-14 PROCEDURE — 99231 SBSQ HOSP IP/OBS SF/LOW 25: CPT | Performed by: PHYSICIAN ASSISTANT

## 2018-07-14 RX ADMIN — CEFTRIAXONE 1000 MG: 1 INJECTION, POWDER, FOR SOLUTION INTRAMUSCULAR; INTRAVENOUS at 20:32

## 2018-07-14 RX ADMIN — FUROSEMIDE 20 MG: 20 TABLET ORAL at 09:16

## 2018-07-14 RX ADMIN — HEPARIN SODIUM 5000 UNITS: 5000 INJECTION, SOLUTION INTRAVENOUS; SUBCUTANEOUS at 13:28

## 2018-07-14 RX ADMIN — HEPARIN SODIUM 5000 UNITS: 5000 INJECTION, SOLUTION INTRAVENOUS; SUBCUTANEOUS at 05:52

## 2018-07-14 RX ADMIN — SENNOSIDES 8.6 MG: 8.6 TABLET, FILM COATED ORAL at 09:18

## 2018-07-14 RX ADMIN — HEPARIN SODIUM 5000 UNITS: 5000 INJECTION, SOLUTION INTRAVENOUS; SUBCUTANEOUS at 21:33

## 2018-07-14 NOTE — PROGRESS NOTES
Progress Note - Elsa Pike 6/5/1930, 80 y o  female MRN: [id number]    Unit/Bed#: [facility] 732-01 Encounter: [id number]    Primary Care Provider: Chandan Ruiz MD   Date and time admitted to hospital: 7/12/2018  6:40 PM      Urinary tract infection without hematuria   Assessment & Plan    · UA postive  Preliminary result showing >100,000 gram negative rods  Await final culture and sensitivities to tailor antibiotic treatment  · Pt afebrile, HDS  · C/w IV ceftriaxone, follow-up final urine culture   · Patient does have abdominal tenderness to palpation LLQ/suprapubic area - CT A/P reviewed which shows diverticulosis without evidence of diverticulitis or colitis, no bowel destruction  Cholelithiasis without evidence of cholecystitis or biliary obstruction  Non-obstructing right and left renal calculi  Suspect 2/2 UTI - monitor on abx  * Ambulatory dysfunction   Assessment & Plan    · 2/2 acute UTI  · PT/OT eval - recommending discharge to rehab when medically stable   · Fall precautions          Acute kidney injury [facility])   Assessment & Plan    · Cr elevated on admission from baseline of 0 8-1 0 to 1 30  · Resolved and improved with IVF, Cr today   94  · D/C IVF, monitor BMP        Weakness   Assessment & Plan    · PT/OT eval  · PT recommending discharge to rehab when stable         Hypertension   Assessment & Plan    · Resume home meds  · Hypertensive this AM, will monitor and adjust as needed          VTE Pharmacologic Prophylaxis:   Pharmacologic: Heparin  Mechanical VTE Prophylaxis in Place: No    Patient Centered Rounds: I have performed bedside rounds with nursing staff today  Discussions with Specialists or Other Care Team Provider: RN     Education and Discussions with Family / Patient: patient and son, Jc Vazquez, via phone     Time Spent for Care: 20 minutes  More than 50% of total time spent on counseling and coordination of care as described above      Current Length of Stay: 2 day(s)    Current Patient Status: Inpatient   Certification Statement: The patient will continue to require additional inpatient hospital stay due to IV antibiotics, pending urine culture and sensitivites, await placement    Discharge Plan: once off IV abx, accepted for placement     Code Status: Level 1 - Full Code      Subjective:   Patient states she feels about the same as she did yesterday, notes she does not feel any worse  She has not had further episodes of nausea and vomiting today like she did yesterday  Denies fevers, sweats  States she is urinating frequently, though reports she typically does as she takes Lasix  Reports to still feeling weak  Objective:     Vitals:   Temp (24hrs), Av 7 °F (36 5 °C), Min:97 4 °F (36 3 °C), Max:98 1 °F (36 7 °C)    HR:  [73-84] 73  Resp:  [18-20] 18  BP: (144-176)/(56-76) 165/60  SpO2:  [98 %-100 %] 100 %  Body mass index is 29 19 kg/m²  Input and Output Summary (last 24 hours): Intake/Output Summary (Last 24 hours) at 18 1425  Last data filed at 18 1218   Gross per 24 hour   Intake              840 ml   Output             2196 ml   Net            -1356 ml       Physical Exam:     Physical Exam   Constitutional: She is oriented to person, place, and time  She appears well-developed and well-nourished  No distress  More awake today, conversing more   HENT:   Head: Normocephalic and atraumatic  Mouth/Throat: Oropharynx is clear and moist    Eyes: EOM are normal  Pupils are equal, round, and reactive to light  No scleral icterus  Neck: Normal range of motion  Neck supple  No thyromegaly present  Cardiovascular: Normal rate, regular rhythm, normal heart sounds and intact distal pulses  No murmur heard  Pulmonary/Chest: Effort normal and breath sounds normal  No respiratory distress  She has no wheezes  She has no rales  She exhibits no tenderness  Abdominal: Soft  Bowel sounds are normal  She exhibits no distension   There is no tenderness  Musculoskeletal: Normal range of motion  She exhibits edema and tenderness  Neurological: She is alert and oriented to person, place, and time  No cranial nerve deficit  Skin: Skin is warm and dry  She is not diaphoretic  No erythema  Psychiatric: She has a normal mood and affect  Her behavior is normal  Judgment and thought content normal    Vitals reviewed  Additional Data:     Labs:      Results from last 7 days  Lab Units 07/14/18  0445   WBC Thousand/uL 6 19   HEMOGLOBIN g/dL 13 9   HEMATOCRIT % 44 4   PLATELETS Thousands/uL 165   NEUTROS PCT % 64   LYMPHS PCT % 24   MONOS PCT % 10   EOS PCT % 1       Results from last 7 days  Lab Units 07/14/18  0445  07/12/18  1906   SODIUM mmol/L 143  < > 140   POTASSIUM mmol/L 3 6  < > 3 6   CHLORIDE mmol/L 108  < > 103   CO2 mmol/L 28  < > 29   BUN mg/dL 15  < > 21   CREATININE mg/dL 0 94  < > 1 30   CALCIUM mg/dL 9 0  < > 9 6   TOTAL PROTEIN g/dL  --   --  6 5   BILIRUBIN TOTAL mg/dL  --   --  0 41   ALK PHOS U/L  --   --  88   ALT U/L  --   --  22   AST U/L  --   --  17   GLUCOSE RANDOM mg/dL 107  < > 130   < > = values in this interval not displayed  Results from last 7 days  Lab Units 07/12/18  1936   INR  1 07                 * I Have Reviewed All Lab Data Listed Above  * Additional Pertinent Lab Tests Reviewed: All Labs Within Last 24 Hours Reviewed    Imaging:    Imaging Reports Reviewed Today Include: None  Imaging Personally Reviewed by Myself Includes:  None    Recent Cultures (last 7 days):       Results from last 7 days  Lab Units 07/12/18  1915   URINE CULTURE  >100,000 cfu/ml Gram Negative Eladio Enteric Like*       Last 24 Hours Medication List:     Current Facility-Administered Medications:  cefTRIAXone 1,000 mg Intravenous Q24H Sandy Higginbotham MD Last Rate: 1,000 mg (07/13/18 2021)   furosemide 20 mg Oral Daily Sandy Aden MD    heparin (porcine) 5,000 Units Subcutaneous Q8H [address] Sandy Higginbotham MD    ondansetron 4 mg Intravenous Q6H PRN Bilal WILLIE Higginbotham MD    ondansetron 4 mg Oral Once Anila Alicia PA-C    senna 1 tablet Oral Daily Biljimmy Tejada MD         Today, Patient Was Seen By: Levy Frazier PA-C    ** Please Note: Dictation voice to text software may have been used in the creation of this document   **

## 2018-07-14 NOTE — ASSESSMENT & PLAN NOTE
· UA postive  Preliminary result showing >100,000 gram negative rods  Await final culture and sensitivities to tailor antibiotic treatment  · Pt afebrile, HDS  · C/w IV ceftriaxone, follow-up final urine culture   · Patient does have abdominal tenderness to palpation LLQ/suprapubic area - CT A/P reviewed which shows diverticulosis without evidence of diverticulitis or colitis, no bowel destruction  Cholelithiasis without evidence of cholecystitis or biliary obstruction  Non-obstructing right and left renal calculi  Suspect 2/2 UTI - monitor on abx

## 2018-07-14 NOTE — PLAN OF CARE
Problem: Potential for Falls  Goal: Patient will remain free of falls  INTERVENTIONS:  - Assess patient frequently for physical needs  -  Identify cognitive and physical deficits and behaviors that affect risk of falls    -  [place] fall precautions as indicated by assessment   - Educate patient/family on patient safety including physical limitations  - Instruct patient to call for assistance with activity based on assessment  - Modify environment to reduce risk of injury  - Consider OT/PT consult to assist with strengthening/mobility   Outcome: Progressing      Problem: Prexisting or High Potential for Compromised Skin Integrity  Goal: Skin integrity is maintained or improved  INTERVENTIONS:  - Identify patients at risk for skin breakdown  - Assess and monitor skin integrity  - Assess and monitor nutrition and hydration status  - Monitor labs (i e  albumin)  - Assess for incontinence   - Turn and reposition patient  - Assist with mobility/ambulation  - Relieve pressure over bony prominences  - Avoid friction and shearing  - Provide appropriate hygiene as needed including keeping skin clean and dry  - Evaluate need for skin moisturizer/barrier cream  - Collaborate with interdisciplinary team (i e  Nutrition, Rehabilitation, etc )   - Patient/family teaching   Outcome: Progressing      Problem: GENITOURINARY - ADULT  Goal: Maintains or returns to baseline urinary function  INTERVENTIONS:  - Assess urinary function  - Encourage oral fluids to ensure adequate hydration  - Administer IV fluids as ordered to ensure adequate hydration  - Administer ordered medications as needed  - Offer frequent toileting  - Follow urinary retention protocol if ordered   Outcome: Progressing    Goal: Absence of urinary retention  INTERVENTIONS:  - Assess patients ability to void and empty bladder  - Monitor I/O  - Bladder scan as needed  - Discuss with physician/AP medications to alleviate retention as needed  - Discuss catheterization for long term situations as appropriate   Outcome: Progressing      Problem: METABOLIC, FLUID AND ELECTROLYTES - ADULT  Goal: Electrolytes maintained within normal limits  INTERVENTIONS:  - Monitor labs and assess patient for signs and symptoms of electrolyte imbalances  - Administer electrolyte replacement as ordered  - Monitor response to electrolyte replacements, including repeat lab results as appropriate  - Instruct patient on fluid and nutrition as appropriate   Outcome: Progressing    Goal: Fluid balance maintained  INTERVENTIONS:  - Monitor labs and assess for signs and symptoms of volume excess or deficit  - Monitor I/O and WT  - Instruct patient on fluid and nutrition as appropriate   Outcome: Progressing

## 2018-07-14 NOTE — ASSESSMENT & PLAN NOTE
· Cr elevated on admission from baseline of 0 8-1 0 to 1 30  · Resolved and improved with IVF, Cr today   94  · D/C IVF, monitor BMP

## 2018-07-15 LAB
ANION GAP SERPL CALCULATED.3IONS-SCNC: 7 MMOL/L (ref 4–13)
BASOPHILS # BLD AUTO: 0.03 THOUSANDS/ΜL (ref 0–0.1)
BASOPHILS NFR BLD AUTO: 1 % (ref 0–1)
BUN SERPL-MCNC: 13 MG/DL (ref 5–25)
CALCIUM SERPL-MCNC: 9.3 MG/DL (ref 8.3–10.1)
CHLORIDE SERPL-SCNC: 108 MMOL/L (ref 100–108)
CO2 SERPL-SCNC: 27 MMOL/L (ref 21–32)
CREAT SERPL-MCNC: 0.83 MG/DL (ref 0.6–1.3)
EOSINOPHIL # BLD AUTO: 0.15 THOUSAND/ΜL (ref 0–0.61)
EOSINOPHIL NFR BLD AUTO: 4 % (ref 0–6)
ERYTHROCYTE [DISTWIDTH] IN BLOOD BY AUTOMATED COUNT: 14.4 % (ref 11.6–15.1)
GFR SERPL CREATININE-BSD FRML MDRD: 63 ML/MIN/1.73SQ M
GLUCOSE SERPL-MCNC: 101 MG/DL (ref 65–140)
HCT VFR BLD AUTO: 43.9 % (ref 34.8–46.1)
HGB BLD-MCNC: 14.2 G/DL (ref 11.5–15.4)
IMM GRANULOCYTES # BLD AUTO: 0.02 THOUSAND/UL (ref 0–0.2)
IMM GRANULOCYTES NFR BLD AUTO: 1 % (ref 0–2)
LYMPHOCYTES # BLD AUTO: 1.5 THOUSANDS/ΜL (ref 0.6–4.47)
LYMPHOCYTES NFR BLD AUTO: 35 % (ref 14–44)
MCH RBC QN AUTO: 29.9 PG (ref 26.8–34.3)
MCHC RBC AUTO-ENTMCNC: 32.3 G/DL (ref 31.4–37.4)
MCV RBC AUTO: 92 FL (ref 82–98)
MONOCYTES # BLD AUTO: 0.41 THOUSAND/ΜL (ref 0.17–1.22)
MONOCYTES NFR BLD AUTO: 10 % (ref 4–12)
NEUTROPHILS # BLD AUTO: 2.19 THOUSANDS/ΜL (ref 1.85–7.62)
NEUTS SEG NFR BLD AUTO: 49 % (ref 43–75)
NRBC BLD AUTO-RTO: 0 /100 WBCS
PLATELET # BLD AUTO: 169 THOUSANDS/UL (ref 149–390)
PMV BLD AUTO: 9.8 FL (ref 8.9–12.7)
POTASSIUM SERPL-SCNC: 4 MMOL/L (ref 3.5–5.3)
RBC # BLD AUTO: 4.75 MILLION/UL (ref 3.81–5.12)
SODIUM SERPL-SCNC: 142 MMOL/L (ref 136–145)
WBC # BLD AUTO: 4.3 THOUSAND/UL (ref 4.31–10.16)

## 2018-07-15 PROCEDURE — 84443 ASSAY THYROID STIM HORMONE: CPT | Performed by: PSYCHIATRY & NEUROLOGY

## 2018-07-15 PROCEDURE — 99232 SBSQ HOSP IP/OBS MODERATE 35: CPT | Performed by: PHYSICIAN ASSISTANT

## 2018-07-15 PROCEDURE — 80048 BASIC METABOLIC PNL TOTAL CA: CPT | Performed by: PHYSICIAN ASSISTANT

## 2018-07-15 PROCEDURE — 85025 COMPLETE CBC W/AUTO DIFF WBC: CPT | Performed by: PHYSICIAN ASSISTANT

## 2018-07-15 RX ADMIN — HEPARIN SODIUM 5000 UNITS: 5000 INJECTION, SOLUTION INTRAVENOUS; SUBCUTANEOUS at 21:31

## 2018-07-15 RX ADMIN — CEFTRIAXONE 1000 MG: 1 INJECTION, POWDER, FOR SOLUTION INTRAMUSCULAR; INTRAVENOUS at 20:26

## 2018-07-15 RX ADMIN — SENNOSIDES 8.6 MG: 8.6 TABLET, FILM COATED ORAL at 08:19

## 2018-07-15 RX ADMIN — FUROSEMIDE 20 MG: 20 TABLET ORAL at 08:19

## 2018-07-15 RX ADMIN — HEPARIN SODIUM 5000 UNITS: 5000 INJECTION, SOLUTION INTRAVENOUS; SUBCUTANEOUS at 13:37

## 2018-07-15 RX ADMIN — HEPARIN SODIUM 5000 UNITS: 5000 INJECTION, SOLUTION INTRAVENOUS; SUBCUTANEOUS at 06:36

## 2018-07-15 NOTE — ASSESSMENT & PLAN NOTE
· UA postive  Preliminary result showing >100,000 gram negative rods  Await final culture and sensitivities to tailor antibiotic treatment  · Pt afebrile, HDS  · C/w IV ceftriaxone, follow-up final urine culture, can transition to PO abx upon discharge  · Patient does have abdominal tenderness to palpation LLQ/suprapubic area - CT A/P reviewed which shows diverticulosis without evidence of diverticulitis or colitis, no bowel destruction  Cholelithiasis without evidence of cholecystitis or biliary obstruction  Non-obstructing right and left renal calculi  Suspect 2/2 UTI - monitor on abx

## 2018-07-15 NOTE — ASSESSMENT & PLAN NOTE
· PT/OT eval  · PT recommending discharge to rehab when stable   · Patient has persistent, rhythmic tremor, primarily of LLE  Patient is unable to control this  Patient denies pain or recent injuries other than when she fell back down into her chair prior to coming to the hospital   However, pt also is very stiff on exam, unable to assess passive ROM of B/L LE  Will obtain MRI of lumbar spine for further assessment

## 2018-07-15 NOTE — SOCIAL WORK
CM met with patient and son/POA Cuca Cuevas (c:  [phone] or w:  [phone]) at bedside to explain CM role and discuss d/c planning  Pt resides with her grandson Efrain Ludwig in a 2-story home with 2 CHARITO  Pt requires some assist with ADLs  History of STR with [facility] of this year  History of HHC with [facility]  Family provides transport to appointments  Preferred Rx:  [facility] in [place]  No history of MH or D/A treatment reported  PT/OT recommendations for rehab  CM spoke with patient and family who are agreeable and requesting referral to [facility]  Referral placed in [facility]  CM reviewed d/c planning process including the following: identifying help at home, patient preference for d/c planning needs, Discharge Lounge, [facility] Meds to Bed program, availability of treatment team to discuss questions or concerns patient and/or family may have regarding understanding medications and recognizing signs and symptoms once discharged  CM also encouraged patient to follow up with all recommended appointments after discharge  Patient advised of importance for patient and family to participate in managing patients medical well being

## 2018-07-15 NOTE — PROGRESS NOTES
Progress Note - Elsa Pike 6/5/1930, 80 y o  female MRN: [id number]    Unit/Bed#: [facility] 732-01 Encounter: [id number]    Primary Care Provider: Chandan Ruiz MD   Date and time admitted to hospital: 7/12/2018  6:40 PM      Urinary tract infection without hematuria   Assessment & Plan    · UA postive  Preliminary result showing >100,000 gram negative rods  Await final culture and sensitivities to tailor antibiotic treatment  · Pt afebrile, HDS  · C/w IV ceftriaxone, follow-up final urine culture, can transition to PO abx upon discharge  · Patient does have abdominal tenderness to palpation LLQ/suprapubic area - CT A/P reviewed which shows diverticulosis without evidence of diverticulitis or colitis, no bowel destruction  Cholelithiasis without evidence of cholecystitis or biliary obstruction  Non-obstructing right and left renal calculi  Suspect 2/2 UTI - monitor on abx  * Ambulatory dysfunction   Assessment & Plan    · 2/2 acute UTI  · PT/OT eval - recommending discharge to rehab when medically stable   · Fall precautions  · Will obtain MRI of lumbar spine          Acute kidney injury ([facility] [address] )   Assessment & Plan    · Cr elevated on admission from baseline of 0 8-1 0 to 1 30  · Resolved and improved with IVF, Cr today   94  · D/C IVF, monitor BMP        Weakness   Assessment & Plan    · PT/OT eval  · PT recommending discharge to rehab when stable   · Patient has persistent, rhythmic tremor, primarily of LLE  Patient is unable to control this  Patient denies pain or recent injuries other than when she fell back down into her chair prior to coming to the hospital   However, pt also is very stiff on exam, unable to assess passive ROM of B/L LE  Will obtain MRI of lumbar spine for further assessment           Hypertension   Assessment & Plan    · Resume home meds  · BP controlled          VTE Pharmacologic Prophylaxis:   Pharmacologic: Heparin  Mechanical VTE Prophylaxis in Place: Yes    Patient Centered Rounds: I have performed bedside rounds with nursing staff today  Discussions with Specialists or Other Care Team Provider: RN, attending physician     Education and Discussions with Family / Patient: patient, son, and grandson at bedside     Time Spent for Care: 30 minutes  More than 50% of total time spent on counseling and coordination of care as described above  Current Length of Stay: 3 day(s)    Current Patient Status: Inpatient   Certification Statement: The patient will continue to require additional inpatient hospital stay due to abx treatment, await placement, MRI of lumbar spine pending    Discharge Plan: when stable, placement     Code Status: Level 1 - Full Code      Subjective:   Patient states she feels "not good "  Cannot say specifically what is not good  Patient denies dysuria, back pain, abdominal pain, n/v, SOB, CP  States she still feels very weak and has tremor of left lower extremity primarily  Patient denies recent falls/trauma other than when she fell back into her chair prior to admission, family at bedside denies recent falls or trauma  States that the tremor is annoying  Patient grimaces intermittently throughout exam, though patient denies pain  Otherwise has no complaints  Objective:     Vitals:   Temp (24hrs), Av 6 °F (36 4 °C), Min:97 2 °F (36 2 °C), Max:98 °F (36 7 °C)    HR:  [80-85] 83  Resp:  [18] 18  BP: (116-130)/(60-74) 120/74  SpO2:  [93 %-95 %] 95 %  Body mass index is 26 69 kg/m²  Input and Output Summary (last 24 hours): Intake/Output Summary (Last 24 hours) at 07/15/18 1616  Last data filed at 07/15/18 1230   Gross per 24 hour   Intake              710 ml   Output              789 ml   Net              -79 ml       Physical Exam:     Physical Exam   Constitutional: She is oriented to person, place, and time  She appears well-developed and well-nourished  No distress     Occasional grimacing, pt denies pain   HENT:   Head: Normocephalic and atraumatic  Mouth/Throat: Oropharynx is clear and moist    Eyes: EOM are normal  Pupils are equal, round, and reactive to light  No scleral icterus  Neck: Normal range of motion  Neck supple  No thyromegaly present  Cardiovascular: Normal rate, regular rhythm and normal heart sounds  No murmur heard  Pulmonary/Chest: Effort normal and breath sounds normal  No respiratory distress  She has no wheezes  Abdominal: Soft  Bowel sounds are normal  She exhibits no distension  There is no tenderness  Musculoskeletal: Normal range of motion  She exhibits edema (2+ edema b/l LE)  Rhythmic tremor, LLE  Limited passive range of motion b/l LE, full rom b/l wrist   Strength 2/5 LE    Neurological: She is alert and oriented to person, place, and time  No cranial nerve deficit  Skin: Skin is warm and dry  No rash noted  She is not diaphoretic  No erythema  Psychiatric: She has a normal mood and affect  Her behavior is normal  Thought content normal    Vitals reviewed  Additional Data:     Labs:      Results from last 7 days  Lab Units 07/15/18  0551   WBC Thousand/uL 4 30*   HEMOGLOBIN g/dL 14 2   HEMATOCRIT % 43 9   PLATELETS Thousands/uL 169   NEUTROS PCT % 49   LYMPHS PCT % 35   MONOS PCT % 10   EOS PCT % 4       Results from last 7 days  Lab Units 07/15/18  0551  07/12/18  1906   SODIUM mmol/L 142  < > 140   POTASSIUM mmol/L 4 0  < > 3 6   CHLORIDE mmol/L 108  < > 103   CO2 mmol/L 27  < > 29   BUN mg/dL 13  < > 21   CREATININE mg/dL 0 83  < > 1 30   CALCIUM mg/dL 9 3  < > 9 6   TOTAL PROTEIN g/dL  --   --  6 5   BILIRUBIN TOTAL mg/dL  --   --  0 41   ALK PHOS U/L  --   --  88   ALT U/L  --   --  22   AST U/L  --   --  17   GLUCOSE RANDOM mg/dL 101  < > 130   < > = values in this interval not displayed  Results from last 7 days  Lab Units 07/12/18  1936   INR  1 07                 * I Have Reviewed All Lab Data Listed Above  * Additional Pertinent Lab Tests Reviewed:  All Labs Within Last 24 Hours Reviewed    Imaging:    Imaging Reports Reviewed Today Include: none  Imaging Personally Reviewed by Myself Includes:  none    Recent Cultures (last 7 days):       Results from last 7 days  Lab Units 07/12/18 1915   URINE CULTURE  >100,000 cfu/ml Escherichia coli*       Last 24 Hours Medication List:     Current Facility-Administered Medications:  cefTRIAXone 1,000 mg Intravenous Q24H Sandy Higginbotham MD Last Rate: 1,000 mg (07/14/18 2032)   furosemide 20 mg Oral Daily Sandy Pérez MD    heparin (porcine) 5,000 Units Subcutaneous Q8H [address] Biljimmy Higginbotham MD    ondansetron 4 mg Intravenous Q6H PRN Sandy Higginbotham MD    ondansetron 4 mg Oral Once Anila Alicia PA-C    senna 1 tablet Oral Daily Sandy Pérez MD         Today, Patient Was Seen By: Masha Helton PA-C    ** Please Note: Dictation voice to text software may have been used in the creation of this document   **

## 2018-07-15 NOTE — ASSESSMENT & PLAN NOTE
· 2/2 acute UTI  · PT/OT eval - recommending discharge to rehab when medically stable   · Fall precautions  · Will obtain MRI of lumbar spine

## 2018-07-16 ENCOUNTER — APPOINTMENT (INPATIENT)
Dept: NEUROLOGY | Facility: AMBULATORY SURGERY CENTER | Age: 83
DRG: 682 | End: 2018-07-16
Payer: MEDICARE

## 2018-07-16 ENCOUNTER — APPOINTMENT (INPATIENT)
Dept: RADIOLOGY | Facility: HOSPITAL | Age: 83
DRG: 682 | End: 2018-07-16
Payer: MEDICARE

## 2018-07-16 PROBLEM — R25.1 TREMOR: Status: ACTIVE | Noted: 2018-07-16

## 2018-07-16 LAB
AMMONIA PLAS-SCNC: 10 UMOL/L (ref 11–35)
CK SERPL-CCNC: 53 U/L (ref 26–192)
FOLATE SERPL-MCNC: >20 NG/ML (ref 3.1–17.5)
TSH SERPL DL<=0.05 MIU/L-ACNC: 1.57 UIU/ML (ref 0.36–3.74)
VIT B12 SERPL-MCNC: 288 PG/ML (ref 100–900)

## 2018-07-16 PROCEDURE — 82607 VITAMIN B-12: CPT | Performed by: PSYCHIATRY & NEUROLOGY

## 2018-07-16 PROCEDURE — 99232 SBSQ HOSP IP/OBS MODERATE 35: CPT | Performed by: PHYSICIAN ASSISTANT

## 2018-07-16 PROCEDURE — 82140 ASSAY OF AMMONIA: CPT | Performed by: PHYSICIAN ASSISTANT

## 2018-07-16 PROCEDURE — 72128 CT CHEST SPINE W/O DYE: CPT

## 2018-07-16 PROCEDURE — 97535 SELF CARE MNGMENT TRAINING: CPT

## 2018-07-16 PROCEDURE — 95951 HB EEG MONITORING/VIDEORECORD: CPT

## 2018-07-16 PROCEDURE — 72148 MRI LUMBAR SPINE W/O DYE: CPT

## 2018-07-16 PROCEDURE — 82550 ASSAY OF CK (CPK): CPT | Performed by: PHYSICIAN ASSISTANT

## 2018-07-16 PROCEDURE — 99223 1ST HOSP IP/OBS HIGH 75: CPT | Performed by: PSYCHIATRY & NEUROLOGY

## 2018-07-16 PROCEDURE — 72125 CT NECK SPINE W/O DYE: CPT

## 2018-07-16 PROCEDURE — 82746 ASSAY OF FOLIC ACID SERUM: CPT | Performed by: PHYSICIAN ASSISTANT

## 2018-07-16 RX ORDER — CEPHALEXIN 500 MG/1
500 CAPSULE ORAL EVERY 12 HOURS SCHEDULED
Status: DISCONTINUED | OUTPATIENT
Start: 2018-07-16 | End: 2018-07-17

## 2018-07-16 RX ORDER — KETOROLAC TROMETHAMINE 30 MG/ML
30 INJECTION, SOLUTION INTRAMUSCULAR; INTRAVENOUS ONCE
Status: COMPLETED | OUTPATIENT
Start: 2018-07-16 | End: 2018-07-16

## 2018-07-16 RX ORDER — LORAZEPAM 2 MG/ML
0.5 INJECTION INTRAMUSCULAR ONCE
Status: COMPLETED | OUTPATIENT
Start: 2018-07-16 | End: 2018-07-16

## 2018-07-16 RX ORDER — LORAZEPAM 2 MG/ML
1 INJECTION INTRAMUSCULAR ONCE
Status: DISCONTINUED | OUTPATIENT
Start: 2018-07-16 | End: 2018-07-16

## 2018-07-16 RX ORDER — KETOROLAC TROMETHAMINE 30 MG/ML
15 INJECTION, SOLUTION INTRAMUSCULAR; INTRAVENOUS EVERY 6 HOURS SCHEDULED
Status: COMPLETED | OUTPATIENT
Start: 2018-07-16 | End: 2018-07-17

## 2018-07-16 RX ORDER — METHOCARBAMOL 750 MG/1
750 TABLET, FILM COATED ORAL
Status: DISCONTINUED | OUTPATIENT
Start: 2018-07-16 | End: 2018-07-18

## 2018-07-16 RX ADMIN — LORAZEPAM 0.5 MG: 2 INJECTION INTRAMUSCULAR; INTRAVENOUS at 16:51

## 2018-07-16 RX ADMIN — SENNOSIDES 8.6 MG: 8.6 TABLET, FILM COATED ORAL at 08:07

## 2018-07-16 RX ADMIN — CEPHALEXIN 500 MG: 500 CAPSULE ORAL at 21:13

## 2018-07-16 RX ADMIN — HEPARIN SODIUM 5000 UNITS: 5000 INJECTION, SOLUTION INTRAVENOUS; SUBCUTANEOUS at 21:13

## 2018-07-16 RX ADMIN — KETOROLAC TROMETHAMINE 30 MG: 30 INJECTION, SOLUTION INTRAMUSCULAR at 17:58

## 2018-07-16 RX ADMIN — HEPARIN SODIUM 5000 UNITS: 5000 INJECTION, SOLUTION INTRAVENOUS; SUBCUTANEOUS at 13:22

## 2018-07-16 RX ADMIN — FUROSEMIDE 20 MG: 20 TABLET ORAL at 08:07

## 2018-07-16 RX ADMIN — LORAZEPAM 0.5 MG: 2 INJECTION INTRAMUSCULAR; INTRAVENOUS at 16:41

## 2018-07-16 RX ADMIN — HEPARIN SODIUM 5000 UNITS: 5000 INJECTION, SOLUTION INTRAVENOUS; SUBCUTANEOUS at 05:15

## 2018-07-16 RX ADMIN — METHOCARBAMOL 750 MG: 750 TABLET ORAL at 21:13

## 2018-07-16 NOTE — ASSESSMENT & PLAN NOTE
· Cr elevated on admission from baseline of 0 8-1 0 to 1 30  · Resolved and improved with IVF  · D/C IVF, monitor BMP

## 2018-07-16 NOTE — ASSESSMENT & PLAN NOTE
· 2/2 acute UTI  · PT/OT eval - recommending discharge to rehab when medically stable   · Fall precautions  · MRI lumbar spine: "Slight progression of lumbar degenerative disc disease with slight worsening of canal stenosis and foraminal narrowing"

## 2018-07-16 NOTE — ASSESSMENT & PLAN NOTE
· Patient having tremors of b/l UE, LE, left greater than right  · MRI lumbar spine: "slight progression of lumbar degenerative disc disease with slight worsening of canal stenosis and foraminal narrowing"  · Neurology consulted and appreciate recommendations  Plan for CT c-spine, t-spine, EEG, ativan 1 mg given x 1, robaxin 750 mg hs, toradol

## 2018-07-16 NOTE — PROGRESS NOTES
Progress Note - Estephania Merino 6/5/1930, 80 y o  female MRN: [id number]    Unit/Bed#: [facility] 732-01 Encounter: [id number]    Primary Care Provider: Tonya Hall MD   Date and time admitted to hospital: 7/12/2018  6:40 PM      Urinary tract infection without hematuria   Assessment & Plan    · UA postive  Preliminary result showing >100,000 gram negative rods  Await final culture and sensitivities to tailor antibiotic treatment  · Pt afebrile, HDS  · On IV Ceftriaxone, will D/C and transition to PO Keflex based on urine culture to complete 7 day course of antibiotics, ending 7/19/18  · Patient does have abdominal tenderness to palpation LLQ/suprapubic area - CT A/P reviewed which shows diverticulosis without evidence of diverticulitis or colitis, no bowel destruction  Cholelithiasis without evidence of cholecystitis or biliary obstruction  Non-obstructing right and left renal calculi  * Ambulatory dysfunction   Assessment & Plan    · 2/2 acute UTI  · PT/OT eval - recommending discharge to rehab when medically stable   · Fall precautions  · MRI lumbar spine: "Slight progression of lumbar degenerative disc disease with slight worsening of canal stenosis and foraminal narrowing"           Tremor   Assessment & Plan    · Patient having tremors of b/l UE, LE, left greater than right  · MRI lumbar spine: "slight progression of lumbar degenerative disc disease with slight worsening of canal stenosis and foraminal narrowing"  · Neurology consulted and appreciate recommendations  Plan for CT c-spine, t-spine, EEG, ativan 1 mg given x 1, robaxin 750 mg hs, toradol  Acute kidney injury ([facility] [address] )   Assessment & Plan    · Cr elevated on admission from baseline of 0 8-1 0 to 1 30  · Resolved and improved with IVF  · D/C IVF, monitor BMP        Weakness   Assessment & Plan    · PT/OT eval  · PT recommending discharge to rehab when stable   · Patient has persistent, rhythmic tremor, primarily of LLE    Patient is unable to control this  Patient denies pain or recent injuries other than when she fell back down into her chair prior to coming to the hospital   However, pt also is very stiff on exam, unable to assess passive ROM of B/L LE    · MRI lumbar spine: slight progression of lumbar degenerative disc disease with slight worsening of canal stenosis and foraminal narrowing  · Appreciate neurology recommendations         Hypertension   Assessment & Plan    · Resume home meds  · BP controlled            VTE Pharmacologic Prophylaxis:   Pharmacologic: Heparin  Mechanical VTE Prophylaxis in Place: Yes    Patient Centered Rounds: I have performed bedside rounds with nursing staff today  Discussions with Specialists or Other Care Team Provider: RN, Neurology     Education and Discussions with Family / Patient: patient, richie Cuevas     Time Spent for Care: 30 minutes  More than 50% of total time spent on counseling and coordination of care as described above  Current Length of Stay: 4 day(s)    Current Patient Status: Inpatient   Certification Statement: The patient will continue to require additional inpatient hospital stay due to pending placement, neurology evaluation     Discharge Plan: when stable     Code Status: Level 1 - Full Code      Subjective:   Pt today says she feels "not good" and that nothing feels good, patient not able to elaborate on this  Denies fevers, chest pain, shortness of breath, abdominal pain, nausea, vomiting  States she continues to feel weak and has the tremor  Objective:     Vitals:   Temp (24hrs), Av 7 °F (36 5 °C), Min:97 7 °F (36 5 °C), Max:97 7 °F (36 5 °C)    HR:  [86-90] 86  Resp:  [20] 20  BP: (131-133)/(71-86) 133/71  SpO2:  [97 %] 97 %  Body mass index is 26 37 kg/m²  Input and Output Summary (last 24 hours):        Intake/Output Summary (Last 24 hours) at 18 1659  Last data filed at 18 1500   Gross per 24 hour   Intake              880 ml   Output 1204 ml   Net             -324 ml       Physical Exam:     Physical Exam   Constitutional: She is oriented to person, place, and time  She appears well-developed and well-nourished  No distress  HENT:   Head: Normocephalic and atraumatic  Mouth/Throat: No oropharyngeal exudate  Eyes: EOM are normal  No scleral icterus  Neck: Normal range of motion  Neck supple  No thyromegaly present  Cardiovascular: Normal rate, regular rhythm and normal heart sounds  No murmur heard  Pulmonary/Chest: Effort normal and breath sounds normal  No respiratory distress  She has no wheezes  She has no rales  She exhibits no tenderness  Abdominal: Soft  Bowel sounds are normal  She exhibits no distension  There is tenderness (mild tenderness)  Musculoskeletal: Normal range of motion  She exhibits edema (+1 edema B/L LE)  ++rhythmic tremor of LLE   Neurological: She is alert and oriented to person, place, and time  No cranial nerve deficit  Skin: Skin is warm and dry  No rash noted  She is not diaphoretic  No erythema  Psychiatric: She has a normal mood and affect  Her behavior is normal  Judgment and thought content normal    Vitals reviewed  Additional Data:     Labs:      Results from last 7 days  Lab Units 07/15/18  0551   WBC Thousand/uL 4 30*   HEMOGLOBIN g/dL 14 2   HEMATOCRIT % 43 9   PLATELETS Thousands/uL 169   NEUTROS PCT % 49   LYMPHS PCT % 35   MONOS PCT % 10   EOS PCT % 4       Results from last 7 days  Lab Units 07/15/18  0551  07/12/18  1906   SODIUM mmol/L 142  < > 140   POTASSIUM mmol/L 4 0  < > 3 6   CHLORIDE mmol/L 108  < > 103   CO2 mmol/L 27  < > 29   BUN mg/dL 13  < > 21   CREATININE mg/dL 0 83  < > 1 30   CALCIUM mg/dL 9 3  < > 9 6   TOTAL PROTEIN g/dL  --   --  6 5   BILIRUBIN TOTAL mg/dL  --   --  0 41   ALK PHOS U/L  --   --  88   ALT U/L  --   --  22   AST U/L  --   --  17   GLUCOSE RANDOM mg/dL 101  < > 130   < > = values in this interval not displayed      Results from last 7 days  Lab Units 07/12/18  1936   INR  1 07                 * I Have Reviewed All Lab Data Listed Above  * Additional Pertinent Lab Tests Reviewed: All Labs Within Last 24 Hours Reviewed    Imaging:    Imaging Reports Reviewed Today Include: MRI lumbar spine  Imaging Personally Reviewed by Myself Includes:  None     Recent Cultures (last 7 days):       Results from last 7 days  Lab Units 07/12/18  1915   URINE CULTURE  >100,000 cfu/ml Escherichia coli*       Last 24 Hours Medication List:     Current Facility-Administered Medications:  cephalexin 500 mg Oral Q12H [address] Anila Alicia PA-C   furosemide 20 mg Oral Daily Sandy Thomas MD   heparin (porcine) 5,000 Units Subcutaneous Q8H [address] Sandy Higginbotham MD   ondansetron 4 mg Intravenous Q6H PRN Sandy Higginbotham MD   ondansetron 4 mg Oral Once Anila Alicia PA-C   senna 1 tablet Oral Daily Sandy Thomas MD        Today, Patient Was Seen By: Chao Mendez PA-C    ** Please Note: Dictation voice to text software may have been used in the creation of this document   **

## 2018-07-16 NOTE — CONSULTS
Consultation - Neurology   Justice Walton 80 y o  female MRN: [id number]  [facility] Encounter: [id number]      Assessment/Plan   Assessment:  Justice Walton is a 80 y o  female with history of amaurosis fugax in 11/2016, hypertension, hyperlipidemia, traumatic SAH in 2/2018 after falling when shoveling snow who present to the hospital on 07/12/2018 secondary to complaints of generalized weakness with inability to get out of her chair which started the day prior  CT head was negative for acute abnormality  MRI lumbar spine revealed "slight progression of lumbar degenerative disc disease and slight worsening of canal stenosis and foraminal narrowing  Neurology consulted due to tremor and stiffness  Plan:  LLE Tremor, diffuse pain in all extremities and neck  Most pronounced on LLE  Abrupt rhythmic movement which varies in amplitude and frequency  Patient also with complaints of pain with PROM and AROM of all extremities, as well as meningeal signs  Patient reports symptoms have become worse since admission  Etiology of symptoms unclear  Will do stat imaging to evaluate for cord compression and then proceed with vEEG to evaluate for partial complex seizure  0 5mg Ativan trial x 2 was administered during Dr Candy Castañeda exam  The amplitude of the LLE tremor seems to improve, but remained present  CT head was negative for acute abnormality  A small focus of encephalomalacia at the left frontoparietal junction, the sequelae of prior hemorrhage, was noted    Stat CT C spine and T spine wo contrast ordered  VEEG after imaging completed  TSH, B12, folate, ammonia, CK ordered  BLE venous duplex ordered  Robaxin 750mg qhs ordered  Toradol 30mg x1 now, Toradol 15mg x 2 additional doses q h6 hours    Ambulatory dysfunction and weakness  MRI lumbar spine revealing "slight progression of lumbar degenerative disc disease and slight worsening of canal stenosis and foraminal narrowing "  PT/OT  Fall precautions     UTI  LLQ tenderness  UA positive, Urine culture positive for hand susceptible E coli  S/p Ceftriaxone  Now on Keflex    REINIER  Resolved with IVF  As per primary team      History of Present Illness     Reason for Consult / Principal Problem: Weakness, tremor    HPI: Ana Luisa Brooks is a 80 y o  left handed female with history of amaurosis fugax in 11/2016, hypertension, hyperlipidemia, traumatic SAH in 2/2018 after falling when shoveling snow who present to the hospital on 07/12/2018 secondary to complaints of generalized weakness with inability to get out of her chair which started the day prior  CT head was negative for acute abnormality  A small focus of encephalomalacia at the left frontoparietal junction, the sequelae of prior hemorrhage, was noted  UA noted to be positive  Patient also with c/o LLQ discomfort  She was given Ceftriaxone  Urine culture pending  Patient was hypertensive on admission at 176/56  Since then, the patient's BP has normalized  Patient continues to be afebrile  Labs otherwise are benign  According to primary team documentation, patient noted to have an uncontrollable persistent, rhythmic tremor, primarily of the left lower extremity  Additionally, they mention that she is quite stiff on exam     Today on exam, the patient was supine in bed  When asked how she is doing she states "I'm miserable    Am I on my way out? Is this what it feels like to be dying?" When asked why she felt miserable she was unable to provide details  Inpatient consult to Neurology  Consult performed by: Marcelo Gabriel ordered by: Deidre Lechuga        Review of Systems A 12 point ROS was completed and other than the above mentioned complaints in the HPI and those listed below, all remaining systems were negative    - fatigue  - pain in arms and legs  - tremor in LLE      Historical Information   Past Medical History:   Diagnosis Date    Amaurosis fugax 11/18/2016    Arthritis  History of subarachnoid hemorrhage     Hyperlipidemia     Hypertension     Nephrolithiasis      Past Surgical History:   Procedure Laterality Date    LITHOTRIPSY      [place] FRAGMENT KIDNEY STONE/ ESWL Left 1/3/2017    Procedure: LITHROTRIPSY EXTRACORPORAL SHOCKWAVE (ESWL); Surgeon: Marty Link MD;  [facility];  Service: Urology    [place] FRAGMENT KIDNEY STONE/ ESWL Right 11/18/2016    Procedure: Prisca Lamb SHOCKWAVE (ESWL); Surgeon: Marty Link MD;  [facility];  Service: Urology    URETERAL STENT PLACEMENT Bilateral 11/9/2016    Procedure: Raudel Vincent; Lizbeth Heckle ;  Surgeon: Marty Link MD;  [facility];  Service:      Social History   History   Alcohol Use No     History   Drug Use No     History   Smoking Status    Never Smoker   Smokeless Tobacco    Never Used     Family History:   Family History   Problem Relation Age of Onset    Pneumonia Mother     Cancer Sister     Prostate cancer Brother        Review of previous medical records was completed  Meds/Allergies   all current active meds have been reviewed    No Known Allergies    Objective   Vitals:Blood pressure 131/86, pulse 90, temperature 97 7 °F (36 5 °C), temperature source Oral, resp  rate 20, height 5' 2" (1 575 m), weight 65 4 kg (144 lb 2 9 oz), SpO2 97 %  ,Body mass index is 26 37 kg/m²  Intake/Output Summary (Last 24 hours) at 07/16/18 1516  Last data filed at 07/16/18 1500   Gross per 24 hour   Intake              880 ml   Output             1204 ml   Net             -324 ml       Invasive Devices: Invasive Devices     Peripheral Intravenous Line            Peripheral IV 07/13/18 Left Forearm 3 days                Physical Exam   Constitutional: She is oriented to person, place, and time  She appears well-developed and well-nourished  No distress  Elderly female supine in bed, LLE tremor noted from the door   HENT:   Head: Normocephalic and atraumatic     Thinning hair   Eyes: Conjunctivae are normal  Pupils are equal, round, and reactive to light  Right eye exhibits no discharge  Left eye exhibits no discharge  No scleral icterus  Neck: Normal range of motion  Neck supple  Cardiovascular: Normal rate and regular rhythm  Exam reveals no gallop and no friction rub  No murmur heard  Pulmonary/Chest: Effort normal and breath sounds normal  No stridor  No respiratory distress  She has no wheezes  She has no rales  She exhibits no tenderness  Musculoskeletal: She exhibits tenderness (BUE, BLE and neck)  She exhibits no edema or deformity  Lymphadenopathy:     She has no cervical adenopathy  Neurological: She is alert and oriented to person, place, and time  Skin: Skin is warm and dry  No rash noted  No erythema  Psychiatric:   Cooperative  Congruent mood and affect     Neurologic Exam     Mental Status   Oriented to person, place, and time  Follows 1 step commands  Attention: normal    Knowledge: good  Alert  Oriented to person, place, time and situation  Knows the current and prior president  Able to do calculations  Unable to spell WORLD backwards  Speech is normal without aphasia or dysarthria     Cranial Nerves     CN II   Visual acuity: normal (grossly)  Right visual field deficit: none  Left visual field deficit: none     CN III, IV, VI   Pupils are equal, round, and reactive to light  Nystagmus: none   Diplopia: none  Conjugate gaze: present    CN V   Facial sensation intact  CN VII   Facial expression full, symmetric  CN XI   Right sternocleidomastoid strength: normal  Left sternocleidomastoid strength: normal    CN XII   CN XII normal    Initially patient's gaze limited to the right; however, upon repeat assessment improved  Trapezius strength unable to be formally assessed, due to patient's pain and inability to demonstrate shoulder shrug, L more decreased than R  Motor Exam   Muscle bulk: decreased  Varied exam  Limited on PROM  Tone was difficult to evaluate with patient having a difficult time relaxing  Unable to maintain antigravity position in BUEs to evaluate for pronator drift  BUEs: Proximal PROM limited by pain to 3/4ths range  Patient unable to actively lift arms to >45 degrees due to pain  RLE:   Iliopsoas 4-  Quadricep/Hamstring: assessment limited by pain  Drosi/plantar flexion 5-    LLE:   Unable to evaluate iliopsoas/Quadricep/Hamstring due to pain  Dorsi/Plantar flexion 2  LLE tremor throughout exam         Sensory Exam   Light touch normal    Right arm vibration: normal  Left arm vibration: normal  Right leg vibration: decreased from knee  Left leg vibration: decreased from knee  Temperature intact throughout symmetrically     Gait, Coordination, and Reflexes     Reflexes   Right plantar: normal  Left plantar: upgoing  Hyporeflexic throughout  Abrupt rhythmic movement of the LLE which varies in amplitude and frequency noted throughout exam        Lab Results: No results found for this or any previous visit (from the past 24 hour(s))  Imaging Studies: I have personally reviewed pertinent films in PACS  EKG, Pathology, and Other Studies: I have personally reviewed pertinent reports      VTE Prophylaxis: Heparin    Code Status: Level 1 - Full Code

## 2018-07-16 NOTE — CASE MANAGEMENT
Continued Stay Review    Date: 7-16-18      Vital Signs: /71 (BP Location: Left arm)   Pulse 86   Temp 97 7 °F (36 5 °C) (Oral)   Resp 20   Ht 5' 2" (1 575 m)   Wt 65 4 kg (144 lb 2 9 oz)   SpO2 97%   BMI 26 37 kg/m²     Medications:   Scheduled Meds:   Current Facility-Administered Medications:  cephalexin 500 mg Oral Q12H FRANCIS   furosemide 20 mg Oral Daily   heparin (porcine) 5,000 Units Subcutaneous Q8H [facility]   ondansetron 4 mg Intravenous Q6H PRN   ondansetron 4 mg Oral Once   senna 1 tablet Oral Daily         Abnormal Labs/Diagnostic Results:      Lab Units 07/12/18  1915   URINE CULTURE   >100,000 cfu/ml Escherichia coli*            Age/Sex: 80 y o  female     Assessment/Plan:      Urinary tract infection without hematuria   Assessment & Plan     · UA postive  Preliminary result showing >100,000 gram negative rods  Await final culture and sensitivities to tailor antibiotic treatment  · Pt afebrile, HDS  · C/w IV ceftriaxone, follow-up final urine culture, can transition to PO abx upon discharge  · Patient does have abdominal tenderness to palpation LLQ/suprapubic area - CT A/P reviewed which shows diverticulosis without evidence of diverticulitis or colitis, no bowel destruction  Cholelithiasis without evidence of cholecystitis or biliary obstruction  Non-obstructing right and left renal calculi  Suspect 2/2 UTI - monitor on abx            * Ambulatory dysfunction   Assessment & Plan     · 2/2 acute UTI  · PT/OT eval - recommending discharge to rehab when medically stable   · Fall precautions  · Will obtain MRI of lumbar spine             Acute kidney injury ([facility] [address] )   Assessment & Plan     · Cr elevated on admission from baseline of 0 8-1 0 to 1 30  · Resolved and improved with IVF, Cr today   94  · D/C IVF, monitor BMP          Weakness   Assessment & Plan     · PT/OT eval  · PT recommending discharge to rehab when stable   · Patient has persistent, rhythmic tremor, primarily of LLE    Patient is unable to control this  Patient denies pain or recent injuries other than when she fell back down into her chair prior to coming to the hospital   However, pt also is very stiff on exam, unable to assess passive ROM of B/L LE  Will obtain MRI of lumbar spine for further assessment                Discharge Plan: when stable, placement   referral to [facility]

## 2018-07-16 NOTE — ASSESSMENT & PLAN NOTE
· PT/OT eval  · PT recommending discharge to rehab when stable   · Patient has persistent, rhythmic tremor, primarily of LLE  Patient is unable to control this    Patient denies pain or recent injuries other than when she fell back down into her chair prior to coming to the hospital   However, pt also is very stiff on exam, unable to assess passive ROM of B/L LE    · MRI lumbar spine: slight progression of lumbar degenerative disc disease with slight worsening of canal stenosis and foraminal narrowing  · Appreciate neurology recommendations

## 2018-07-16 NOTE — OCCUPATIONAL THERAPY NOTE
Occupational Therapy Treatment Note     07/16/18 1147   Restrictions/Precautions   Weight Bearing Precautions Per Order No   Other Precautions Bed Alarm;Pain   Lifestyle   Autonomy Pt is I w/ ADLs, however she reports she has required increased assistance in the days PTA  Pt receives assist for IADLs and ambulates w/ RW   Reciprocal Relationships Pt lives w/ her grandson who works overnight  Her son lives nearby and assists as needed  Service to Others Retired   Intrinsic Gratification Enjoys crossword puzzles   Pain Assessment   Pain Assessment FLACC   Pain Rating: FLACC (Rest) - Face 0   Pain Rating: FLACC (Rest) - Legs 0   Pain Rating: FLACC (Rest) - Activity 0   Pain Rating: FLACC (Rest) - Cry 0   Pain Rating: FLACC (Rest) - Consolability 0   Score: FLACC (Rest) 0   Pain Rating: FLACC (Activity) - Face 1   Pain Rating: FLACC (Activity) - Legs 1   Pain Rating: FLACC (Activity) - Activity 1   Pain Rating: FLACC (Activity) - Cry 1   Pain Rating: FLACC (Activity) - Consolability 1   Score: FLACC (Activity) 5   ADL   Where Assessed Supine, bed   Equipment Provided Built up utensils; Other (Comment)   Eating Assistance 3  Moderate Assistance   Eating Deficit Increased time to complete;Setup;Supervision/safety   Eating Comments hand over hand assist to reach mouth    Bed Mobility   Supine to Sit 2  Maximal assistance   Additional items Assist x 2   Sit to Supine 2  Maximal assistance   Additional items Assist x 2   Cognition   Overall Cognitive Status [facility]   Arousal/Participation Responsive; Cooperative   Attention Attends with cues to redirect   Orientation Level Oriented X4   Memory Unable to assess   Following Commands Follows one step commands with increased time or repetition   Assessment   Assessment Pt participated in occupational therapy with focus on activity tolerance, bed mob, UB self-care/self-feeding  Pt cleared by DAYLIN/Terrie for pt participation in occupational  therapy    Pt received HOB raised/supine pt sitting upright and agreeable to therapy following pt Identifiers confirmed  Pt reported she was incont of urine and agreeable to bed mob/rolling R and L for change of linen  Pt required max A x 2 for bed mob 2* pt significant "all over"  Pt required bed level treatment session 2* pt limited activity tolerance for bed mob  Pt reported significant pain  Pt will require in-pt rehab to continue to address pt noted deficits with decreased strength, coordination and activity tolerance which currently impair pt ADL and functional mob      Plan   Treatment Interventions ADL retraining   Goal Expiration Date 07/27/18   Treatment Day 1   OT Frequency 3-5x/wk   Recommendation   OT Discharge Recommendation Short Term Rehab   Barthel Index   Feeding 10   Bathing 0   Grooming Score 0   Dressing Score 0   Bladder Score 10   Bowels Score 10   Toilet Use Score 5   Transfers (Bed/Chair) Score 5   Mobility (Level Surface) Score 0   Stairs Score 0   Barthel Index Score 40   Modified Ray Scale   Modified Ray Scale 4       Felipa ROSE/L

## 2018-07-16 NOTE — PLAN OF CARE
Problem: OCCUPATIONAL THERAPY ADULT  Goal: Performs self-care activities at highest level of function for planned discharge setting  See evaluation for individualized goals  Treatment Interventions: ADL retraining, Functional transfer training, UE strengthening/ROM, Endurance training, Patient/family training, Equipment evaluation/education, Compensatory technique education, Continued evaluation, Energy conservation, Activityengagement          See flowsheet documentation for full assessment, interventions and recommendations  Outcome: Progressing  Limitation: Decreased ADL status, Decreased endurance, Decreased self-care trans, Decreased high-level ADLs  Prognosis: Good  Assessment: Pt participated in occupational therapy with focus on activity tolerance, bed mob, UB self-care/self-feeding  Pt cleared by DAYLIN/Terrie for pt participation in occupational  therapy  Pt received HOB raised/supine pt sitting upright and agreeable to therapy following pt Identifiers confirmed  Pt reported she was incont of urine and agreeable to bed mob/rolling R and L for change of linen  Pt required max A x 2 for bed mob 2* pt significant "all over"  Pt required bed level treatment session 2* pt limited activity tolerance for bed mob  Pt reported significant pain  Pt will require in-pt rehab to continue to address pt noted deficits with decreased strength, coordination and activity tolerance which currently impair pt ADL and functional mob        OT Discharge Recommendation: Short Term Rehab  OT - OK to Discharge: Yes (when medically stable)

## 2018-07-16 NOTE — ASSESSMENT & PLAN NOTE
· UA postive  Preliminary result showing >100,000 gram negative rods  Await final culture and sensitivities to tailor antibiotic treatment  · Pt afebrile, HDS  · On IV Ceftriaxone, will D/C and transition to PO Keflex based on urine culture to complete 7 day course of antibiotics, ending 7/19/18  · Patient does have abdominal tenderness to palpation LLQ/suprapubic area - CT A/P reviewed which shows diverticulosis without evidence of diverticulitis or colitis, no bowel destruction  Cholelithiasis without evidence of cholecystitis or biliary obstruction  Non-obstructing right and left renal calculi

## 2018-07-17 ENCOUNTER — APPOINTMENT (INPATIENT)
Dept: NON INVASIVE DIAGNOSTICS | Facility: HOSPITAL | Age: 83
DRG: 682 | End: 2018-07-17
Payer: MEDICARE

## 2018-07-17 ENCOUNTER — APPOINTMENT (INPATIENT)
Dept: NEUROLOGY | Facility: AMBULATORY SURGERY CENTER | Age: 83
DRG: 682 | End: 2018-07-17
Payer: MEDICARE

## 2018-07-17 PROBLEM — E04.1 THYROID NODULE: Status: ACTIVE | Noted: 2018-07-17

## 2018-07-17 PROCEDURE — 99232 SBSQ HOSP IP/OBS MODERATE 35: CPT | Performed by: PSYCHIATRY & NEUROLOGY

## 2018-07-17 PROCEDURE — 93970 EXTREMITY STUDY: CPT

## 2018-07-17 PROCEDURE — 97535 SELF CARE MNGMENT TRAINING: CPT

## 2018-07-17 PROCEDURE — 93970 EXTREMITY STUDY: CPT | Performed by: SURGERY

## 2018-07-17 PROCEDURE — 99232 SBSQ HOSP IP/OBS MODERATE 35: CPT | Performed by: INTERNAL MEDICINE

## 2018-07-17 PROCEDURE — 97110 THERAPEUTIC EXERCISES: CPT | Performed by: PHYSICAL THERAPIST

## 2018-07-17 PROCEDURE — 97112 NEUROMUSCULAR REEDUCATION: CPT | Performed by: PHYSICAL THERAPIST

## 2018-07-17 PROCEDURE — 95951 PR EEG MONITORING/VIDEORECORD: CPT | Performed by: PSYCHIATRY & NEUROLOGY

## 2018-07-17 PROCEDURE — 95951 HB EEG MONITORING/VIDEORECORD: CPT

## 2018-07-17 RX ORDER — CARBAMAZEPINE 200 MG/1
200 TABLET ORAL 2 TIMES DAILY
Status: DISPENSED | OUTPATIENT
Start: 2018-07-17 | End: 2018-07-18

## 2018-07-17 RX ORDER — CARBAMAZEPINE 200 MG/1
200 TABLET ORAL 2 TIMES DAILY
Status: DISCONTINUED | OUTPATIENT
Start: 2018-07-18 | End: 2018-07-18

## 2018-07-17 RX ORDER — PRAVASTATIN SODIUM 80 MG/1
80 TABLET ORAL
Status: DISCONTINUED | OUTPATIENT
Start: 2018-07-17 | End: 2018-07-18

## 2018-07-17 RX ORDER — CEPHALEXIN 500 MG/1
500 CAPSULE ORAL EVERY 12 HOURS SCHEDULED
Status: DISPENSED | OUTPATIENT
Start: 2018-07-17 | End: 2018-07-18

## 2018-07-17 RX ORDER — LEVETIRACETAM 750 MG/1
750 TABLET ORAL EVERY 12 HOURS SCHEDULED
Status: DISCONTINUED | OUTPATIENT
Start: 2018-07-17 | End: 2018-07-18

## 2018-07-17 RX ORDER — LORAZEPAM 2 MG/ML
1 INJECTION INTRAMUSCULAR EVERY 8 HOURS
Status: DISCONTINUED | OUTPATIENT
Start: 2018-07-17 | End: 2018-07-17

## 2018-07-17 RX ORDER — CARBAMAZEPINE 200 MG/1
200 TABLET ORAL ONCE
Status: DISCONTINUED | OUTPATIENT
Start: 2018-07-17 | End: 2018-07-18

## 2018-07-17 RX ADMIN — FUROSEMIDE 20 MG: 20 TABLET ORAL at 08:14

## 2018-07-17 RX ADMIN — PRAVASTATIN SODIUM 80 MG: 80 TABLET ORAL at 15:40

## 2018-07-17 RX ADMIN — LEVETIRACETAM 2000 MG: 100 INJECTION, SOLUTION INTRAVENOUS at 11:55

## 2018-07-17 RX ADMIN — CARBAMAZEPINE 200 MG: 200 TABLET ORAL at 13:12

## 2018-07-17 RX ADMIN — HEPARIN SODIUM 5000 UNITS: 5000 INJECTION, SOLUTION INTRAVENOUS; SUBCUTANEOUS at 07:13

## 2018-07-17 RX ADMIN — HEPARIN SODIUM 5000 UNITS: 5000 INJECTION, SOLUTION INTRAVENOUS; SUBCUTANEOUS at 13:12

## 2018-07-17 RX ADMIN — LEVETIRACETAM 2000 MG: 100 INJECTION, SOLUTION INTRAVENOUS at 10:07

## 2018-07-17 RX ADMIN — HEPARIN SODIUM 5000 UNITS: 5000 INJECTION, SOLUTION INTRAVENOUS; SUBCUTANEOUS at 22:24

## 2018-07-17 RX ADMIN — SODIUM CHLORIDE 200 MG: 9 INJECTION, SOLUTION INTRAVENOUS at 15:40

## 2018-07-17 RX ADMIN — KETOROLAC TROMETHAMINE 15 MG: 30 INJECTION, SOLUTION INTRAMUSCULAR at 07:13

## 2018-07-17 RX ADMIN — KETOROLAC TROMETHAMINE 15 MG: 30 INJECTION, SOLUTION INTRAMUSCULAR at 00:42

## 2018-07-17 RX ADMIN — CEPHALEXIN 500 MG: 500 CAPSULE ORAL at 08:14

## 2018-07-17 RX ADMIN — LORAZEPAM 1 MG: 2 INJECTION INTRAMUSCULAR; INTRAVENOUS at 17:15

## 2018-07-17 RX ADMIN — SODIUM CHLORIDE 200 MG: 9 INJECTION, SOLUTION INTRAVENOUS at 22:25

## 2018-07-17 RX ADMIN — SENNOSIDES 8.6 MG: 8.6 TABLET, FILM COATED ORAL at 08:14

## 2018-07-17 NOTE — PHYSICAL THERAPY NOTE
Physical Therapy Evaluation     Patient's Name: Chantale Chen    Admitting Diagnosis  UTI (urinary tract infection) [N39 0]  Weakness [R53 1]  Heme positive stool [R19 5]  Ambulatory dysfunction [R26 2]    Problem List  Patient Active Problem List   Diagnosis    Nephrolithiasis    Hypertension    Hyperbilirubinemia    Hyperlipidemia    Amaurosis fugax    Hearing loss    Subarachnoid hemorrhage (HCC)    Tinea pedis    Vitamin D deficiency    Subdural hematoma (HCC)    Lumbar disc disease    Fall    Bilateral shoulder pain    Peripheral edema    Weakness    Ambulatory dysfunction    Urinary tract infection without hematuria    Acute kidney injury ([address] )    Tremor    Thyroid nodule       Past Medical History  Past Medical History:   Diagnosis Date    Amaurosis fugax 11/18/2016    Arthritis     History of subarachnoid hemorrhage     Hyperlipidemia     Hypertension     Nephrolithiasis     Thyroid nodule 7/17/2018       Past Surgical History  Past Surgical History:   Procedure Laterality Date    LITHOTRIPSY      [place] FRAGMENT KIDNEY STONE/ ESWL Left 1/3/2017    Procedure: LITHROTRIPSY EXTRACORPORAL SHOCKWAVE (ESWL); Surgeon: Arian Damon MD;  [facility];  Service: Urology    [place] FRAGMENT KIDNEY STONE/ ESWL Right 11/18/2016    Procedure: Genene Peaks SHOCKWAVE (ESWL);   Surgeon: Arian Damon MD;  [facility];  Service: Urology    URETERAL STENT PLACEMENT Bilateral 11/9/2016    Procedure: Mata Graff; STENT INSERTION ;  Surgeon: Arian Damon MD;  [facility];  Service:         07/17/18 6035   Pain Assessment   Pain Assessment No/denies pain   Pain Score No Pain   Pain Type Chronic pain   Pain Location Generalized   Patient's Stated Pain Goal No pain   Hospital Pain Intervention(s) Repositioned   Response to Interventions worse with mobility   Pain Rating: FLACC (Rest) - Face 0   Pain Rating: FLACC (Rest) - Legs 0   Pain Rating: FLACC (Rest) - Activity 0   Pain Rating: FLACC (Rest) - Cry 0   Pain Rating: FLACC (Rest) - Consolability 0   Score: FLACC (Rest) 0   Pain Rating: FLACC (Activity) - Face 1   Pain Rating: FLACC (Activity) - Legs 1   Pain Rating: FLACC (Activity) - Activity 0   Pain Rating: FLACC (Activity) - Cry 1   Pain Rating: FLACC (Activity) - Consolability 1   Score: FLACC (Activity) 4   Restrictions/Precautions   Weight Bearing Precautions Per Order No   Other Precautions Bed Alarm; Fall Risk;Pain;Seizure  (EEG monitoring)   General   Chart Reviewed Yes   Family/Caregiver Present No   Cognition   Overall Cognitive Status WFL   Arousal/Participation Responsive   Attention Attends with cues to redirect   Orientation Level Oriented X4   Memory Unable to assess   Following Commands Follows one step commands with increased time or repetition   Comments Patient responsive and cooperative with therapy session  Increased verbal cueing needed for attention to task  Subjective   Subjective "I can't sit"   Bed Mobility   Rolling L 2  Maximal assistance   Additional items Assist x 1; Increased time required;LE management;Verbal cues   Supine to Sit 2  Maximal assistance   Additional items Assist x 2;HOB elevated; Increased time required;Verbal cues;LE management   Sit to Supine 2  Maximal assistance   Additional items Assist x 2; Increased time required;Verbal cues;LE management   Additional Comments Patient found supine in bed upon arrival  Max A x2 required for supine to sit transfer  Once in sitting, max Ax1 required from the back to maintain balance  When cued, patient able to maintain upright sitting with min A x1 however, only able to hold this position for approximately 1 min before fatiguing  Patient sat EOB for approx  10 min  Unable to assess further mobility assessment at this time due to decreased sitting balance      Transfers   Sit to Stand Unable to assess   Additional items (Unable to assess further mobility assessment at this time) Balance   Static Sitting Poor   Dynamic Sitting Poor -   Endurance Deficit   Endurance Deficit Yes   Endurance Deficit Description due to generalized weakness, pain and fatigue   Activity Tolerance   Activity Tolerance Patient limited by fatigue;Patient limited by pain   Medical Staff Made Aware Miranda Dela Cruz   Nurse Made Aware Yes, An   Exercises   Knee AROM Long Arc Quad Sitting;10 reps;Bilateral  (10 reps each w/ increased time and increased cues)   Balance training  maintained sitting balance with mod-max A x1 for approximately 10 min    Assessment   Prognosis Fair   Problem List Decreased strength;Decreased range of motion;Decreased endurance; Impaired balance;Decreased mobility; Decreased coordination   Assessment Patient seen today for a physical therapy treatment session  Upon arrival, patient was agreeable to therapy session  Max A x2 required for supine to sit transfer  While EOB for 10 min, patient required mod-max Ax1 from the back to maintain sitting  With verbal cueing, patient able to maintain sitting balance w/ min A x1 and UE support for approx 1 min before fatiguing  Patient able to perform 10 reps/leg of LAQ w/ verbal cuing and assistance  Patient continues to present with decreased strength, impaired sitting balance, decreased motivation, decreased attention, decreased activity tolerance and a decline in functional mobility  Patient will continue to benefit from skilled physical therapy to address these impairments  Continue to recommend discharge to rehab when medically stable  Goals   Patient Goals to go to rehab   STG Expiration Date 07/27/18   Treatment Day 1   Plan   Treatment/Interventions Functional transfer training;LE strengthening/ROM; Therapeutic exercise; Endurance training;Patient/family training;Equipment eval/education; Bed mobility   Progress Progressing toward goals   PT Frequency 2-3x/wk   Recommendation   Recommendation (to rehab when medically stable)   Equipment Recommended Walker  (RW)   PT - OK to Discharge Yes  (to rehab when medically stable )   Additional Comments Patient left supine in bed with bed alarm on and call bell in lap         [employer], SPT

## 2018-07-17 NOTE — PLAN OF CARE
Problem: PHYSICAL THERAPY ADULT  Goal: Performs mobility at highest level of function for planned discharge setting  See evaluation for individualized goals  Treatment/Interventions: Functional transfer training, LE strengthening/ROM, Therapeutic exercise, Endurance training, Patient/family training, Equipment eval/education, Bed mobility  Equipment Recommended: Walker (RW)       See flowsheet documentation for full assessment, interventions and recommendations  Outcome: Progressing  Prognosis: Fair  Problem List: Decreased strength, Decreased range of motion, Decreased endurance, Impaired balance, Decreased mobility, Decreased coordination  Assessment: Patient seen today for a physical therapy treatment session  Upon arrival, patient was agreeable to therapy session  Max A x2 required for supine to sit transfer  While EOB for 10 min, patient required mod-max Ax1 from the back to maintain sitting  With verbal cueing, patient able to maintain sitting balance w/ min A x1 and UE support for approx 1 min before fatiguing  Patient able to perform 10 reps/leg of LAQ w/ verbal cuing and assistance  Patient continues to present with decreased strength, impaired sitting balance, decreased motivation, decreased attention, decreased activity tolerance and a decline in functional mobility  Patient will continue to benefit from skilled physical therapy to address these impairments  Continue to recommend discharge to rehab when medically stable  Recommendation:  (to rehab when medically stable)     PT - OK to Discharge: (S) Yes (to rehab when medically stable )    See flowsheet documentation for full assessment

## 2018-07-17 NOTE — OCCUPATIONAL THERAPY NOTE
Occupational Therapy Treatment Note     07/17/18 1421   Restrictions/Precautions   Weight Bearing Precautions Per Order No   Other Precautions Bed Alarm   Lifestyle   Autonomy Pt is I w/ ADLs, however she reports she has required increased assistance in the days PTA  Pt receives assist for IADLs and ambulates w/ RW   Reciprocal Relationships Pt lives w/ her grandson who works overnight  Her son lives nearby and assists as needed  Service to Others Retired   [address] Enjoys crossword puzzles   Pain Assessment   Pain Assessment No/denies pain   Pain Score No Pain   ADL   Where Assessed Edge of bed   Grooming Assistance 4  Minimal Assistance   Grooming Deficit Teeth care   Grooming Comments while seated at edge of pt bed/ pt requires assist to bring toothbrush up to mouth   Bed Mobility   Rolling R 2  Maximal assistance   Rolling L 2  Maximal assistance   Supine to Sit 2  Maximal assistance   Additional items Assist x 2   Sit to Supine 2  Maximal assistance   Additional items Assist x 2   Transfers   Sit to Stand Unable to assess   Cognition   Overall Cognitive Status [facility]   Arousal/Participation Responsive   Attention Attends with cues to redirect   Orientation Level Oriented X4   Memory Unable to assess   Following Commands Follows one step commands with increased time or repetition   Activity Tolerance   Activity Tolerance Patient limited by fatigue   Assessment   Assessment Pt participated in occupational therapy with focus on activity tolerance,  bed mob, unsupported sitting balance and tolerance for pt engagement in functional self-care task/oral care/grooming tasks and LB dressing  Pt cleared by RN/An for pt participation in occupational therapy  Pt received HOB raised/supine pt sleeping soundly upright and awake to name called  Pt Identifiers confirmed  Pt unable to reported her goal today 2* pt decreased cognition    Pt required assist x 2 for bed mob and assist  for sitting balance 2* significant balance deficits  Pt will require in-pt rehab to continue to address pt deficits with decreased overall strength and activity tolerance which currently impair pt ADL and functional mob      Plan   Treatment Interventions ADL retraining   Goal Expiration Date 07/27/18   Treatment Day 2   OT Frequency 3-5x/wk   Recommendation   OT Discharge Recommendation Short Term Rehab   Barthel Index   Feeding 10   Bathing 0   Grooming Score 0   Dressing Score 0   Bladder Score 10   Bowels Score 10   Toilet Use Score 5   Transfers (Bed/Chair) Score 5   Mobility (Level Surface) Score 0   Stairs Score 0   Barthel Index Score 40   Modified [place] Scale   Modified Evelin Scale 4       Master ROSE/L

## 2018-07-17 NOTE — PROGRESS NOTES
Progress Note - Neurology   Estephania Merino 80 y o  female [id number]  Unit/Bed#: [facility] 718/[facility] 718-01    Assessment:  Estephania Merino is a 80 y o  left handed female with history of amaurosis fugax in 11/2016, [address] in 6/2016, traumatic SAH in 2/2018 after falling when shoveling snow, hypertension, hyperlipidemia,  who present to the hospital on 07/12/2018 secondary to complaints of generalized weakness with inability to get out of her chair which started the day prior  Patient was found to have UTI and is currently being treated for this  Patient developed left upper extremity shaking which resolved, followed by a left lower extremity shaking 2 days later  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  Plan:  Focal status epilepticus  Patient currently with shaking of left upper and left lower extremity  I evaluated her approximately 20 min after the completion of the 1st Keppra bolus  EEG confirms focal motor status with right-sided EEG abnormality  We will plan to initiate AED regimen  Continue video EEG monitoring  Plan for seizure workup with MRI brain after EEG monitoring complete  Of note, patient with a history significant for recent SDH and history of ICH and SAH  I did speak with the patient, and her son Gale Girard by phone, at length discussing the EEG results as well as our plan of care in initiating an AED regimen  EEG confirms focal motor status with EEG abnormality on the right  Please see epileptologist interpretation/documentation  Continue video EEG monitoring at this time  Will evaluate the effects of new AED regimen  Prior Ativan trial decreased amplitude of jerking  Patient initially loaded with IV Keppra 2 g load  Clinical exam continues to be consistent with focal motor status    Additional IV Keppra 2 g load given  Maintenance Keppra 750 mg q 12 hours ordered  Will also add Carbamazepine 200 mg b i d  with 1st dose at 1400 today  Robaxin 750 q h s  and Toradol secondary to pain  CT head negative for acute abnormality  MRI brain with and without contrast ordered - to be completed after video EEG discontinued  Tele  Labs are benign  Troponins negative  TSH WNL  UA positive, patient with confirmed Ecoli UTI  Seizure precautions    UTI  Currently on Keflex  S/p ceftriaxone    HLD  Pravastatin      Subjective:   Alycia Goldberg is a 80 y o  left handed female with history of amaurosis fugax in 11/2016, hypertension, hyperlipidemia, traumatic SAH in 2/2018 after falling when shoveling snow who present to the hospital on 07/12/2018 secondary to complaints of generalized weakness with inability to get out of her chair which started the day prior  CT head was negative for acute abnormality  A small focus of encephalomalacia at the left frontoparietal junction, the sequelae of prior hemorrhage, was noted  UA noted to be positive  Patient also with c/o LLQ discomfort  CT A/P revealed diverticula and nonobstructive nephrolithiasis  She was given Ceftriaxone initially and switched to Keppra once urine culture confirmed Ecoli UTI  Patient was hypertensive on admission at 176/56  Since then, the patient's BP has normalized  Patient continues to be afebrile  Labs otherwise are benign  I did get a chance to talk with the patient's son, Keli Locke  He stated that 1 or 2 days into the patient's admission, he noted left upper extremity jerking which seem to be persistent  The following day he did not notice any, and then yesterday he noted her left lower extremity shaking/jerking  The patient and her son both deny these occurring at home, prior to admission  Keli Locke also stated that several years ago, the patient had a brain bleed and the MRI revealed several areas of small bleeding  I did review her imaging  In 06/2016 she did have an MRI which revealed ICH and multiple SAHs  CTA at that time was unremarkable       During yesterday's exam, the patient was very uncomfortable secondary to the jerking as well as generalized extremity and neck discomfort  Ativan trial was completed with decreased amplitude of left lower extremity tremor  Stat CT C-spine and T-spine were completed revealing moderate to marked good multilevel spondylitic degenerative changes of the cervical spine with severe bilateral neural foraminal narrowing at C4/5 and C5/6  No significant spinal canal stenosis  Additionally, mild multilevel spondylitic degenerative changes of the thoracic spine were noted without spinal canal stenosis or neural foraminal narrowing  Video EEG was initiated  I did speak with Dr Jacy Delgado, epileptologist, this morning who states that the patient is indeed having focal motor seizures with EEG abnormality on the right  Today on exam, the patient states that she continues to have the jerking of her left leg and then it will stop  She has no other complaints  Past Medical History:   Diagnosis Date    Amaurosis fugax 11/18/2016    Arthritis     History of subarachnoid hemorrhage     Hyperlipidemia     Hypertension     Nephrolithiasis     Thyroid nodule 7/17/2018     Past Surgical History:   Procedure Laterality Date    LITHOTRIPSY      VT FRAGMENT KIDNEY STONE/ ESWL Left 1/3/2017    Procedure: LITHROTRIPSY EXTRACORPORAL SHOCKWAVE (ESWL); Surgeon: Sergo Luna MD;  [facility];  Service: Urology    VT FRAGMENT KIDNEY STONE/ ESWL Right 11/18/2016    Procedure: Katie Og SHOCKWAVE (ESWL); Surgeon: Sergo Luna MD;  [facility];  Service: Urology    URETERAL STENT PLACEMENT Bilateral 11/9/2016    Procedure: Bobby Jim; Darren Yang ;  Surgeon: Sergo Luna MD;  [facility];  Service:      Family history:  Family History   Problem Relation Age of Onset    Pneumonia Mother     Cancer Sister     Prostate cancer Brother        Social History     Social History    Marital status:       Spouse name: N/A    Number of children: N/A    Years of education: N/A     Social History Main Topics    Smoking status: Never Smoker    Smokeless tobacco: Never Used    Alcohol use No    Drug use: No    Sexual activity: Not Asked     Other Topics Concern    None     Social History Narrative    None       Scheduled Meds:  Current Facility-Administered Medications:  cephalexin 500 mg Oral Q12H [address] Jaspreetervanessa Martin,    heparin (porcine) 5,000 Units Subcutaneous Q8H [address] Sandy Higginbotham MD   levETIRAcetam 2,000 mg Intravenous Once Noe High, PA-IAM   levETIRAcetam 750 mg Oral Q12H [address] Noe High, [place]   methocarbamol 750 mg Oral HS Noe High, PA-IAM   ondansetron 4 mg Intravenous Q6H PRN Sandy Higginbotham MD   ondansetron 4 mg Oral Once Anila Moren, GRACY   pravastatin 80 mg Oral Daily With [employer] Raj,    senna 1 tablet Oral Daily Sandy Al MD     Continuous Infusions:   PRN Meds: ondansetron    ROS: A 12 point ROS was completed and other than the above mentioned complaints in the HPI, all remaining systems were negative  Vitals: /56 (BP Location: Right arm)   Pulse 68   Temp 98 °F (36 7 °C) (Oral)   Resp 18   Ht 5' 2" (1 575 m)   Wt 65 4 kg (144 lb 2 9 oz)   SpO2 96%   BMI 26 37 kg/m²     Physical Exam:   Physical Exam   Constitutional: She is oriented to person, place, and time  She appears well-developed and well-nourished  No distress  Elderly female right sidlying in bed  Left lower extremity jerking noted upon arrival   HENT:   Head: Normocephalic and atraumatic  Eyes: Conjunctivae and EOM are normal  Pupils are equal, round, and reactive to light  Right eye exhibits no discharge  Left eye exhibits no discharge  No scleral icterus  Neck: Normal range of motion  Neck supple  Cardiovascular: Normal rate and regular rhythm  Exam reveals no gallop and no friction rub  No murmur heard  Pulmonary/Chest: Effort normal and breath sounds normal  No stridor   No respiratory distress  She has no wheezes  She has no rales  She exhibits no tenderness  Musculoskeletal: Normal range of motion  She exhibits no edema, tenderness or deformity  Lymphadenopathy:     She has no cervical adenopathy  Neurological: She is alert and oriented to person, place, and time  Skin: Skin is warm and dry  No rash noted  No erythema  Psychiatric:   Cooperative, pleasant, with congruent affect     Neurologic Exam     Mental Status   Oriented to person, place, and time  Follows 1 step commands  Level of consciousness: alert  Knowledge: good  Normal comprehension  For majority of the exam patient's attention and concentration appeared intact  There was one occurrence with the patient seem to zone off where I needed to repeat my question a couple of times however she was then able to answer appropriately  Speech is normal without evidence of aphasia or dysarthria  Cranial Nerves     CN II   Visual acuity: normal (grossly)    CN III, IV, VI   Pupils are equal, round, and reactive to light  Extraocular motions are normal    Conjugate gaze: present    CN V   Facial sensation intact  CN VII   Facial expression full, symmetric  Motor Exam   Muscle bulk: decreased  Patient's motor exam not limited as much by pain however patient appears somewhat rigid when moving her upper extremities left greater than right  She continues to only be able to elevate bilateral upper extremities to approximately 45°   strength is intact bilaterally  Right dorsi and plantar flexion 5; left dorsi and plantar flexion limited secondary to jerking of left leg though appears weaker  Sensory Exam   Light touch is intact in bilateral upper extremities  Varied results with light touch to bilateral lower extremities  Patient initially stating that right lower extremity was decreased compared to left however then stating that they were equal     Gait, Coordination, and Reflexes   Jerking of LLE>LUE  Amplitude and frequency continues to vary  Labs:  Recent Results (from the past 24 hour(s))   Vitamin B12    Collection Time: 07/16/18  5:07 PM   Result Value Ref Range    Vitamin B-12 288 100 - 900 pg/mL   CK    Collection Time: 07/16/18  6:26 PM   Result Value Ref Range    Total CK 53 26 - 192 U/L   Folate    Collection Time: 07/16/18  6:26 PM   Result Value Ref Range    Folate >20 0 (H) 3 1 - 17 5 ng/mL   Ammonia    Collection Time: 07/16/18  6:26 PM   Result Value Ref Range    Ammonia 10 (L) 11 - 35 umol/L     Imaging: I have personally reviewed pertinent imaging and PACS reports       VTE Prophylaxis: Heparin

## 2018-07-17 NOTE — PROGRESS NOTES
[facility] Progress Note  Patient: Estephania Merino 80 y o  female   MRN: [id number]  PCP: Tonya Hall MD  Unit/Bed#: [facility] 718-01 Encounter: [id number]  Date Of Visit: 18    Assessment:    Principal Problem:    Ambulatory dysfunction  Active Problems:    Hypertension    Weakness    Urinary tract infection without hematuria    Acute kidney injury ([address] )    Tremor      Plan:    1   E coli UTI, continue with oral Keflex to finish 5 day course of antibiotic  2  Generalized weakness and Ambulatory dysfunction, PT recommending STR   3  Left thyroid nodule, normal TSH,  outpatient thyroid ultrasound  4  Left lower extremity tremor with diffuse pain, Neurology input appreciated, cervical and thoracic CT scan without cord compression,  currently on vEEG, started on Robaxin  5  Hypertension, acceptable blood pressure  6  Hyperlipidemia, restart statin       VTE Pharmacologic Prophylaxis:   Pharmacologic: Heparin  Mechanical VTE Prophylaxis in Place: Yes    Patient Centered Rounds: I have performed bedside rounds with nursing staff today  Discussions with Specialists or Other Care Team Provider:     Education and Discussions with Family / Patient:  Patient and her son Gale Girard    Time Spent for Care: 30 minutes  More than 50% of total time spent on counseling and coordination of care as described above      Current Length of Stay: 5 day(s)    Current Patient Status: Inpatient   Certification Statement: The patient will continue to require additional inpatient hospital stay due to Management of lower extremity tremor    Discharge Plan / Estimated Discharge Date: not ready yet    Code Status: Level 1 - Full Code      Subjective:   Patient seen and examined  I feel miserable  Continue to have left lower extremity twitching  No chest pain or shortness of breath    Objective:     Vitals:   Temp (24hrs), Av 7 °F (36 5 °C), Min:97 5 °F (36 4 °C), Max:98 °F (36 7 °C)    HR:  [68-86] 68  Resp:  [18-20] 18  BP: (111-133)/(56-71) 111/56  SpO2:  [95 %-97 %] 96 %  Body mass index is 26 37 kg/m²  Input and Output Summary (last 24 hours): Intake/Output Summary (Last 24 hours) at 07/17/18 0815  Last data filed at 07/17/18 0648   Gross per 24 hour   Intake              280 ml   Output             1279 ml   Net             -999 ml       Physical Exam:     Physical Exam  Patient is awake alert in no acute distress  Lung clear to auscultation bilateral  Heart positive S1-S2 no murmur  Abdomen soft mild generalized tenderness to deep palpation positive bowel sounds  Lower extremities no edema    Additional Data:     Labs:      Results from last 7 days  Lab Units 07/15/18  0551   WBC Thousand/uL 4 30*   HEMOGLOBIN g/dL 14 2   HEMATOCRIT % 43 9   PLATELETS Thousands/uL 169   NEUTROS PCT % 49   LYMPHS PCT % 35   MONOS PCT % 10   EOS PCT % 4       Results from last 7 days  Lab Units 07/15/18  0551  07/12/18  1906   SODIUM mmol/L 142  < > 140   POTASSIUM mmol/L 4 0  < > 3 6   CHLORIDE mmol/L 108  < > 103   CO2 mmol/L 27  < > 29   BUN mg/dL 13  < > 21   CREATININE mg/dL 0 83  < > 1 30   CALCIUM mg/dL 9 3  < > 9 6   TOTAL PROTEIN g/dL  --   --  6 5   BILIRUBIN TOTAL mg/dL  --   --  0 41   ALK PHOS U/L  --   --  88   ALT U/L  --   --  22   AST U/L  --   --  17   GLUCOSE RANDOM mg/dL 101  < > 130   < > = values in this interval not displayed  Results from last 7 days  Lab Units 07/12/18  1936   INR  1 07       * I Have Reviewed All Lab Data Listed Above  * Additional Pertinent Lab Tests Reviewed:  [facility]    Imaging:    Imaging Reports Reviewed Today Include:   Imaging Personally Reviewed by Myself Includes:      Recent Cultures (last 7 days):       Results from last 7 days  Lab Units 07/12/18  1915   URINE CULTURE  >100,000 cfu/ml Escherichia coli*       Last 24 Hours Medication List:     Current Facility-Administered Medications:  cephalexin 500 mg Oral Q12H [address], GRACY   furosemide 20 mg Oral Daily Sandy Phoenix MD   heparin (porcine) 5,000 Units Subcutaneous Q8H [facility] Sandy Higginbotham MD   methocarbamol 750 mg Oral HS Jaspreet Henderson PA-C   ondansetron 4 mg Intravenous Q6H PRN Sandy Higginbotham MD   ondansetron 4 mg Oral Once Anila Alicia PA-C   senna 1 tablet Oral Daily Sandy Phoenix MD        Today, Patient Was Seen By: Oneyda Dan DO    ** Please Note: This note has been constructed using a voice recognition system   **

## 2018-07-17 NOTE — PHYSICAL THERAPY NOTE
Physical Therapy Treatment Note       07/17/18 1425   Pain Assessment   Pain Assessment No/denies pain   Pain Score No Pain   Pain Type Chronic pain   Pain Location Generalized   Patient's Stated Pain Goal No pain   Hospital Pain Intervention(s) Repositioned   Response to Interventions worse with mobility   Pain Rating: FLACC (Rest) - Face 0   Pain Rating: FLACC (Rest) - Legs 0   Pain Rating: FLACC (Rest) - Activity 0   Pain Rating: FLACC (Rest) - Cry 0   Pain Rating: FLACC (Rest) - Consolability 0   Score: FLACC (Rest) 0   Pain Rating: FLACC (Activity) - Face 1   Pain Rating: FLACC (Activity) - Legs 1   Pain Rating: FLACC (Activity) - Activity 0   Pain Rating: FLACC (Activity) - Cry 1   Pain Rating: FLACC (Activity) - Consolability 1   Score: FLACC (Activity) 4   Restrictions/Precautions   Weight Bearing Precautions Per Order No   Other Precautions Bed Alarm; Fall Risk;Pain;Seizure  (EEG monitoring)   General   Chart Reviewed Yes   Family/Caregiver Present No   Cognition   Overall Cognitive Status WFL   Arousal/Participation Responsive   Attention Attends with cues to redirect   Orientation Level Oriented X4   Memory Unable to assess   Following Commands Follows one step commands with increased time or repetition   Comments Patient responsive and cooperative with therapy session  Increased verbal cueing needed for attention to task  Subjective   Subjective "I can't sit"   Bed Mobility   Rolling L 2  Maximal assistance   Additional items Assist x 1; Increased time required;LE management;Verbal cues   Supine to Sit 2  Maximal assistance   Additional items Assist x 2;HOB elevated; Increased time required;Verbal cues;LE management   Sit to Supine 2  Maximal assistance   Additional items Assist x 2; Increased time required;Verbal cues;LE management   Additional Comments Patient found supine in bed upon arrival  Max A x2 required for supine to sit transfer   Once in sitting, max Ax1 required from the back to maintain balance  When cued, patient able to maintain upright sitting with min A x1 however, only able to hold this position for approximately 1 min before fatiguing  Patient sat EOB for approx  10 min  Unable to assess further mobility assessment at this time due to decreased sitting balance  Transfers   Sit to Stand Unable to assess   Additional items (Unable to assess further mobility assessment at this time)   Balance   Static Sitting Poor   Dynamic Sitting Poor -   Endurance Deficit   Endurance Deficit Yes   Endurance Deficit Description due to generalized weakness, pain and fatigue   Activity Tolerance   Activity Tolerance Patient limited by fatigue;Patient limited by pain   Medical Staff Made Aware [facility]   Nurse Made Aware Yes, An   Exercises   Knee AROM Long Arc Quad Sitting;10 reps;Bilateral  (10 reps each w/ increased time and increased cues)   Balance training  maintained sitting balance with mod-max A x1 for approximately 10 min    Assessment   Prognosis Fair   Problem List Decreased strength;Decreased range of motion;Decreased endurance; Impaired balance;Decreased mobility; Decreased coordination   Assessment Patient seen today for a physical therapy treatment session  Upon arrival, patient was agreeable to therapy session  Max A x2 required for supine to sit transfer  While EOB for 10 min, patient required mod-max Ax1 from the back to maintain sitting  With verbal cueing, patient able to maintain sitting balance w/ min A x1 and UE support for approx 1 min before fatiguing  Patient able to perform 10 reps/leg of LAQ w/ verbal cuing and assistance  Patient continues to present with decreased strength, impaired sitting balance, decreased motivation, decreased attention, decreased activity tolerance and a decline in functional mobility  Patient will continue to benefit from skilled physical therapy to address these impairments  Continue to recommend discharge to rehab when medically stable     Goals Patient Goals to go to rehab   STG Expiration Date 07/27/18   Treatment Day 1   Plan   Treatment/Interventions Functional transfer training;LE strengthening/ROM; Therapeutic exercise; Endurance training;Patient/family training;Equipment eval/education; Bed mobility   Progress Progressing toward goals   PT Frequency 2-3x/wk   Recommendation   Recommendation (to rehab when medically stable)   Equipment Recommended Walker  (RW)   PT - OK to Discharge Yes  (to rehab when medically stable )   Additional Comments Patient left supine in bed with bed alarm on and call balderrama in lap       Melanie Mcgrath, SPT

## 2018-07-17 NOTE — PLAN OF CARE
Problem: OCCUPATIONAL THERAPY ADULT  Goal: Performs self-care activities at highest level of function for planned discharge setting  See evaluation for individualized goals  Treatment Interventions: ADL retraining, Functional transfer training, UE strengthening/ROM, Endurance training, Patient/family training, Equipment evaluation/education, Compensatory technique education, Continued evaluation, Energy conservation, Activityengagement          See flowsheet documentation for full assessment, interventions and recommendations  Outcome: Progressing  Limitation: Decreased ADL status, Decreased endurance, Decreased self-care trans, Decreased high-level ADLs  Prognosis: Good  Assessment: Pt participated in occupational therapy with focus on activity tolerance,  bed mob, unsupported sitting balance and tolerance for pt engagement in functional self-care task/oral care/grooming tasks and LB dressing  Pt cleared by RN/An for pt participation in occupational therapy  Pt received HOB raised/supine pt sleeping soundly upright and awake to name called  Pt Identifiers confirmed  Pt unable to reported her goal today 2* pt decreased cognition  Pt required assist x 2 for bed mob and assist  for sitting balance 2* significant balance deficits  Pt will require in-pt rehab to continue to address pt deficits with decreased overall strength and activity tolerance which currently impair pt ADL and functional mob        OT Discharge Recommendation: Short Term Rehab  OT - OK to Discharge: Yes (when medically stable)

## 2018-07-18 ENCOUNTER — APPOINTMENT (INPATIENT)
Dept: NEUROLOGY | Facility: AMBULATORY SURGERY CENTER | Age: 83
DRG: 682 | End: 2018-07-18
Payer: MEDICARE

## 2018-07-18 ENCOUNTER — APPOINTMENT (INPATIENT)
Dept: RADIOLOGY | Facility: HOSPITAL | Age: 83
DRG: 682 | End: 2018-07-18
Payer: MEDICARE

## 2018-07-18 PROBLEM — G40.001 LOCALIZATION-RELATED (FOCAL) (PARTIAL) IDIOPATHIC EPILEPSY AND EPILEPTIC SYNDROMES WITH SEIZURES OF LOCALIZED ONSET, NOT INTRACTABLE, WITH STATUS EPILEPTICUS (HCC): Status: ACTIVE | Noted: 2018-07-18

## 2018-07-18 PROBLEM — G40.901 STATUS EPILEPTICUS (HCC): Status: ACTIVE | Noted: 2018-07-18

## 2018-07-18 PROBLEM — G40.009 LOCALIZATION-RELATED (FOCAL) (PARTIAL) IDIOPATHIC EPILEPSY AND EPILEPTIC SYNDROMES WITH SEIZURES OF LOCALIZED ONSET, NOT INTRACTABLE, WITHOUT STATUS EPILEPTICUS (HCC): Status: RESOLVED | Noted: 2018-07-18 | Resolved: 2018-07-18

## 2018-07-18 PROBLEM — G40.001 LOCALIZATION-RELATED (FOCAL) (PARTIAL) IDIOPATHIC EPILEPSY AND EPILEPTIC SYNDROMES WITH SEIZURES OF LOCALIZED ONSET, NOT INTRACTABLE, WITH STATUS EPILEPTICUS (HCC): Status: RESOLVED | Noted: 2018-07-18 | Resolved: 2018-07-18

## 2018-07-18 PROBLEM — G40.009 LOCALIZATION-RELATED (FOCAL) (PARTIAL) IDIOPATHIC EPILEPSY AND EPILEPTIC SYNDROMES WITH SEIZURES OF LOCALIZED ONSET, NOT INTRACTABLE, WITHOUT STATUS EPILEPTICUS (HCC): Status: ACTIVE | Noted: 2018-07-18

## 2018-07-18 LAB
BASE EXCESS BLDA CALC-SCNC: 1.6 MMOL/L
CARBAMAZEPINE SERPL-MCNC: 3.1 UG/ML (ref 4–12)
HCO3 BLDA-SCNC: 26.9 MMOL/L (ref 22–28)
NASAL CANNULA: 2
O2 CT BLDA-SCNC: 19.4 ML/DL (ref 16–23)
OXYHGB MFR BLDA: 97 % (ref 94–97)
PCO2 BLDA: 44.7 MM HG (ref 36–44)
PH BLDA: 7.4 [PH] (ref 7.35–7.45)
PHENYTOIN SERPL-MCNC: 19 UG/ML (ref 10–20)
PHENYTOIN SERPL-MCNC: <0.4 UG/ML (ref 10–20)
PO2 BLDA: 110.7 MM HG (ref 75–129)
SPECIMEN SOURCE: ABNORMAL

## 2018-07-18 PROCEDURE — 99232 SBSQ HOSP IP/OBS MODERATE 35: CPT | Performed by: INTERNAL MEDICINE

## 2018-07-18 PROCEDURE — 95951 HB EEG MONITORING/VIDEORECORD: CPT

## 2018-07-18 PROCEDURE — 82805 BLOOD GASES W/O2 SATURATION: CPT | Performed by: PHYSICIAN ASSISTANT

## 2018-07-18 PROCEDURE — 97110 THERAPEUTIC EXERCISES: CPT

## 2018-07-18 PROCEDURE — 70450 CT HEAD/BRAIN W/O DYE: CPT

## 2018-07-18 PROCEDURE — 97164 PT RE-EVAL EST PLAN CARE: CPT

## 2018-07-18 PROCEDURE — 80185 ASSAY OF PHENYTOIN TOTAL: CPT | Performed by: PSYCHIATRY & NEUROLOGY

## 2018-07-18 PROCEDURE — 99233 SBSQ HOSP IP/OBS HIGH 50: CPT | Performed by: PSYCHIATRY & NEUROLOGY

## 2018-07-18 PROCEDURE — 92610 EVALUATE SWALLOWING FUNCTION: CPT

## 2018-07-18 PROCEDURE — C9113 INJ PANTOPRAZOLE SODIUM, VIA: HCPCS | Performed by: PHYSICIAN ASSISTANT

## 2018-07-18 PROCEDURE — 95951 PR EEG MONITORING/VIDEORECORD: CPT | Performed by: PSYCHIATRY & NEUROLOGY

## 2018-07-18 PROCEDURE — 80156 ASSAY CARBAMAZEPINE TOTAL: CPT | Performed by: PSYCHIATRY & NEUROLOGY

## 2018-07-18 RX ORDER — SODIUM CHLORIDE, SODIUM GLUCONATE, SODIUM ACETATE, POTASSIUM CHLORIDE, MAGNESIUM CHLORIDE, SODIUM PHOSPHATE, DIBASIC, AND POTASSIUM PHOSPHATE .53; .5; .37; .037; .03; .012; .00082 G/100ML; G/100ML; G/100ML; G/100ML; G/100ML; G/100ML; G/100ML
65 INJECTION, SOLUTION INTRAVENOUS CONTINUOUS
Status: DISCONTINUED | OUTPATIENT
Start: 2018-07-18 | End: 2018-07-19

## 2018-07-18 RX ORDER — PANTOPRAZOLE SODIUM 40 MG/1
40 INJECTION, POWDER, FOR SOLUTION INTRAVENOUS DAILY
Status: DISCONTINUED | OUTPATIENT
Start: 2018-07-18 | End: 2018-07-19

## 2018-07-18 RX ORDER — ACETAMINOPHEN 650 MG/1
650 SUPPOSITORY RECTAL EVERY 4 HOURS PRN
Status: DISCONTINUED | OUTPATIENT
Start: 2018-07-18 | End: 2018-07-20

## 2018-07-18 RX ORDER — PHENYTOIN SODIUM 50 MG/ML
100 INJECTION, SOLUTION INTRAMUSCULAR; INTRAVENOUS EVERY 8 HOURS
Status: DISCONTINUED | OUTPATIENT
Start: 2018-07-18 | End: 2018-07-26

## 2018-07-18 RX ORDER — B-COMPLEX WITH VITAMIN C
1 TABLET ORAL
Status: DISCONTINUED | OUTPATIENT
Start: 2018-07-19 | End: 2018-07-18

## 2018-07-18 RX ADMIN — SODIUM CHLORIDE 1175 MG PE: 9 INJECTION, SOLUTION INTRAVENOUS at 11:39

## 2018-07-18 RX ADMIN — PANTOPRAZOLE SODIUM 40 MG: 40 INJECTION, POWDER, FOR SOLUTION INTRAVENOUS at 20:22

## 2018-07-18 RX ADMIN — HEPARIN SODIUM 5000 UNITS: 5000 INJECTION, SOLUTION INTRAVENOUS; SUBCUTANEOUS at 14:23

## 2018-07-18 RX ADMIN — PHENYTOIN SODIUM 100 MG: 50 INJECTION INTRAMUSCULAR; INTRAVENOUS at 22:36

## 2018-07-18 RX ADMIN — LEVETIRACETAM 750 MG: 100 INJECTION, SOLUTION INTRAVENOUS at 00:07

## 2018-07-18 RX ADMIN — HEPARIN SODIUM 5000 UNITS: 5000 INJECTION, SOLUTION INTRAVENOUS; SUBCUTANEOUS at 05:28

## 2018-07-18 RX ADMIN — HEPARIN SODIUM 5000 UNITS: 5000 INJECTION, SOLUTION INTRAVENOUS; SUBCUTANEOUS at 22:37

## 2018-07-18 RX ADMIN — CEFTRIAXONE 1000 MG: 1 INJECTION, POWDER, FOR SOLUTION INTRAMUSCULAR; INTRAVENOUS at 20:19

## 2018-07-18 RX ADMIN — Medication 100 MG: at 10:26

## 2018-07-18 NOTE — PROGRESS NOTES
Paged Dr Dhaval Jasso in regards to patient's HTN episode and change of mental status       Waiting for response back    Will continue to monitor patient

## 2018-07-18 NOTE — PROGRESS NOTES
[facility] Progress Note  Patient: Alec Stovall 80 y o  female   MRN: [id number]  PCP: Fernanda Degroot MD  Unit/Bed#: [facility] 718-01 Encounter: [id number]  Date Of Visit: 18    Assessment:    Principal Problem:    Ambulatory dysfunction  Active Problems:    Hypertension    Weakness    Urinary tract infection without hematuria    Acute kidney injury ([address] )    Tremor    Thyroid nodule      Plan:    1   E coli UTI, resolved , completed 5 day course of antibiotics  2  Generalized weakness and Ambulatory dysfunction, PT recommending STR   3  Focal status epilepticus/ LLE tremor, started on AEDs, currently on Tegretol, Vimpat and Keppra,  Neurology is following, still on vEEG, plan for brain MRI  4  Left thyroid nodule, normal TSH,  outpatient thyroid ultrasound  5  Hypertension, acceptable blood pressure  6  Hyperlipidemia, continue statin       VTE Pharmacologic Prophylaxis:   Pharmacologic: Heparin  Mechanical VTE Prophylaxis in Place: Yes    Patient Centered Rounds: I have performed bedside rounds with nursing staff today  Discussions with Specialists or Other Care Team Provider:     Education and Discussions with Family / Patient:  Patient     Time Spent for Care: 30 minutes  More than 50% of total time spent on counseling and coordination of care as described above      Current Length of Stay: 6 day(s)    Current Patient Status: Inpatient   Certification Statement: The patient will continue to require additional inpatient hospital stay due to Management focal status epilepticus    Discharge Plan / Estimated Discharge Date: not ready yet    Code Status: Level 1 - Full Code      Subjective:   Patient seen and examined  Drowsy  Not feeling well  Continue to have left lower extremity twitching      Objective:     Vitals:   Temp (24hrs), Av 4 °F (36 9 °C), Min:98 2 °F (36 8 °C), Max:98 5 °F (36 9 °C)    HR:  [64-69] 64  Resp:  [18] 18  BP: ()/(49-50) 108/50  SpO2:  [96 %-97 %] 96 %  Body mass index is 26 05 kg/m²  Input and Output Summary (last 24 hours): Intake/Output Summary (Last 24 hours) at 07/18/18 1000  Last data filed at 07/18/18 7890   Gross per 24 hour   Intake              860 ml   Output              344 ml   Net              516 ml       Physical Exam:     Physical Exam  Patient is drowsy,  in no acute distress, currently on video EEG  Lung clear to auscultation bilateral anteriorly  Heart positive S1-S2 no murmur  Abdomen soft mild generalized tenderness to deep palpation positive bowel sounds  Lower extremities no edema    Additional Data:     Labs:      Results from last 7 days  Lab Units 07/15/18  0551   WBC Thousand/uL 4 30*   HEMOGLOBIN g/dL 14 2   HEMATOCRIT % 43 9   PLATELETS Thousands/uL 169   NEUTROS PCT % 49   LYMPHS PCT % 35   MONOS PCT % 10   EOS PCT % 4       Results from last 7 days  Lab Units 07/15/18  0551  07/12/18  1906   SODIUM mmol/L 142  < > 140   POTASSIUM mmol/L 4 0  < > 3 6   CHLORIDE mmol/L 108  < > 103   CO2 mmol/L 27  < > 29   BUN mg/dL 13  < > 21   CREATININE mg/dL 0 83  < > 1 30   CALCIUM mg/dL 9 3  < > 9 6   TOTAL PROTEIN g/dL  --   --  6 5   BILIRUBIN TOTAL mg/dL  --   --  0 41   ALK PHOS U/L  --   --  88   ALT U/L  --   --  22   AST U/L  --   --  17   GLUCOSE RANDOM mg/dL 101  < > 130   < > = values in this interval not displayed  Results from last 7 days  Lab Units 07/12/18  1936   INR  1 07       * I Have Reviewed All Lab Data Listed Above  * Additional Pertinent Lab Tests Reviewed:  [facility] Admission Reviewed    Imaging:    Imaging Reports Reviewed Today Include:   Imaging Personally Reviewed by Myself Includes:      Recent Cultures (last 7 days):       Results from last 7 days  Lab Units 07/12/18  1915   URINE CULTURE  >100,000 cfu/ml Escherichia coli*       Last 24 Hours Medication List:     Current Facility-Administered Medications:  carBAMazepine 200 mg Oral BID Vinicio Bautista PA-C   carBAMazepine 200 mg Oral BID Imelda West PA-C   carBAMazepine 200 mg Oral Once Jennifer Devarinjarad, DO   heparin (porcine) 5,000 Units Subcutaneous Q8H [facility] Sandy Higginbotham MD   lacosamide (VIMPAT) IVPB 100 mg Intravenous Q12H Jennifer Devarinjarad, DO   levETIRAcetam 750 mg Oral Q12H [facility] Imelda West PA-C   methocarbamol 750 mg Oral HS Imelda West PA-C   ondansetron 4 mg Intravenous Q6H PRN Sandy Higginbotham MD   ondansetron 4 mg Oral Once Anila Alicia PA-C   pravastatin 80 mg Oral Daily With YUM! Rohit Anthony DO   senna 1 tablet Oral Daily Sandy Rangel MD        Today, Patient Was Seen By: Ninfa Aleman DO    ** Please Note: This note has been constructed using a voice recognition system   **

## 2018-07-18 NOTE — PROGRESS NOTES
Updated Dr Shani Quijano in regards to patient's current status  She stated she will assess the patient in a few minutes       Awaiting for further orders    Will continue to monitor patient

## 2018-07-18 NOTE — SPEECH THERAPY NOTE
Speech Language/Pathology  Speech/Language Pathology  Assessment    Patient Name: Maricruz Delcid  KNXGA'Y Date: 7/18/2018     Problem List  Patient Active Problem List   Diagnosis    Nephrolithiasis    Hypertension    Hyperbilirubinemia    Hyperlipidemia    Amaurosis fugax    Hearing loss    Subarachnoid hemorrhage (HCC)    Tinea pedis    Vitamin D deficiency    Subdural hematoma (HCC)    Lumbar disc disease    Fall    Bilateral shoulder pain    Peripheral edema    Weakness    Ambulatory dysfunction    Urinary tract infection without hematuria    Acute kidney injury ([address] )    Tremor    Thyroid nodule     Past Medical History  Past Medical History:   Diagnosis Date    Amaurosis fugax 11/18/2016    Arthritis     History of subarachnoid hemorrhage     Hyperlipidemia     Hypertension     Nephrolithiasis     Thyroid nodule 7/17/2018     Past Surgical History  Past Surgical History:   Procedure Laterality Date    LITHOTRIPSY      OH FRAGMENT KIDNEY STONE/ ESWL Left 1/3/2017    Procedure: LITHROTRIPSY EXTRACORPORAL SHOCKWAVE (ESWL); Surgeon: Leander Rob MD;  [facility];  Service: Urology    OH FRAGMENT KIDNEY STONE/ ESWL Right 11/18/2016    Procedure: Serjio Cabrera SHOCKWAVE (ESWL); Surgeon: Leander Rob MD;  [facility];  Service: Urology    URETERAL STENT PLACEMENT Bilateral 11/9/2016    Procedure: Doris Bueno; Anirudh Benito ;  Surgeon: Leander Rob MD;  Location[facility];  Service:      Chief Complaint:   Weak  Could not walk  History of Present Illness:  Maricruz Delcid is a 80 y o  female who presents with acute weakness and inability to walk  She had a history of hypertension and chronic lower extremity edema for which she takes Lasix and has been in good health prior to yesterday  Patient reports that she is independent in all activities of daily living and lives with her grandson    Patient reports that today she progressively felt weaker and at dinner time was unable to get up from her chair and kept slipping down  Patient did not fall, lose consciousness  Denied any fevers, chills, nausea, vomiting, diarrhea, dysuria  Attestation signed by Alyssia Hicks DO at 7/17/2018 5:24 PM (Updated)     Split/Shared Statement  I saw/examined the patient  I agree with the Advanced Practitioner's note with the following additions/exceptions: focal status epilepticus with correlate on EEG  Amplitude diminished but persistent focal seizures not aborted thus far  Keppra 4 g loaded and continue with 1000 BID, CBZ PO load 600 total today and will maintain on 200 BID for now, Vimpat 200 x 1 and 100 BID, and added Ativan 1mg q8h  If needed will switch from Keppra to cerebyx or depakote tomorrow  MRI brain seizure protocol w/w/o contrast once focal status aborted and EEG leads unhooked  She is at this time drowsy but arousable and O x 3 but lethargic with medication  If any evidence of respiratory decline, please notify neurology ASAP         Summary:  Pt presents w/ inability to maintain alertness and requires persistent stim  Not appropriate for PO at this time  Will cont to follow  Recommendations:  Diet: npo  Meds: tube/iv  Oral care:  Aspiration precautions  Reflux precautions    Therapy Prognosis: favorable  Prognosis considerations: regular diet at baseline  Frequency:3-5x/week    Consider consult w/:  Nutrition    Reason for consult:  R/o aspiration  Determine safest and least restrictive diet  Change in mental status  New neuro event  H/o neurological disease  Current diet:  Adm on regular, then changed to mechanical  Staff have not been feeding her  Premorbid diet[de-identified]  Presume regular  Previous VBS:  none  O2 requirement:  none  Voice/Speech:  Appropriately responded x 2  'hopital' and that rui was willing to try to eat  However, I could not alert her enough to respond after that     Social:  Home w/ family  Follows commands:  inconsistent Cognitive Status:  Has brief periods of alertness  Oral [facility] exam:  ? A few teeth missing  Open mouth posture  Items administered:  Ice chips only    Oral stage:  Minimal oral movement  Pharyngeal stage:  Delayed swallow present w/ stim  Reduced laryngeal rise  Esophageal stage:  No s/s reported    Results d/w:  Nursing, family, physician, dietician    Goal(s):  Pt will tolerate least restrictive diet w/out s/s aspiration or oral/pharyngeal difficulties

## 2018-07-18 NOTE — PLAN OF CARE
Problem: PHYSICAL THERAPY ADULT  Goal: Performs mobility at highest level of function for planned discharge setting  See evaluation for individualized goals  Treatment/Interventions: Functional transfer training, LE strengthening/ROM, Therapeutic exercise, Endurance training, Patient/family training, Equipment eval/education, Bed mobility  Equipment Recommended: Walker (RW)       See flowsheet documentation for full assessment, interventions and recommendations  Prognosis: Guarded  Problem List: Decreased strength, Decreased range of motion, Decreased endurance, Impaired balance, Decreased mobility, Decreased cognition, Decreased safety awareness, Decreased skin integrity, Pain  Assessment: PT re-eval complete and performed 2* change in medical status and mental status per NSG  Pt demonstrates dependent deficits during mobility including dec endurance,dec static sit balance,FLACC score with pt grimacing in pain during PROM BLE and mobility,dependent for B rolling and BM  Pt scores zero for static sit balance at EOB with dec ability to follow simple one step commands,minimal participation in therapy intervention and inc pain  Pt needed dep Ax1 for repositioning in supine and proper positioning of green wedge pillows for comfort and pressure relief  Pt needed PROM for BLE supine in bed  Pt would cont to benefit from skilled inpt PT bedlevel therapy interventions to maximaize functional independence  NSG and CM aware of treatment and recommendations at this time  Recommendation: Short-term skilled PT, Long-term skilled nursing home placement (LTC vs inpt rhb PENDING mobility progress)     PT - OK to Discharge: (S) Yes (to rehab when medically stable )    See flowsheet documentation for full assessment

## 2018-07-18 NOTE — PLAN OF CARE
DISCHARGE PLANNING - CARE MANAGEMENT     Discharge to post-acute care or home with appropriate resources Progressing        GENITOURINARY - ADULT     Maintains or returns to baseline urinary function Progressing     Absence of urinary retention Progressing        METABOLIC, FLUID AND ELECTROLYTES - ADULT     Electrolytes maintained within normal limits Progressing     Fluid balance maintained Progressing        Nutrition/Hydration-ADULT     Nutrient/Hydration intake appropriate for improving, restoring or maintaining nutritional needs Progressing        Potential for Falls     Patient will remain free of falls Progressing        Prexisting or High Potential for Compromised Skin Integrity     Skin integrity is maintained or improved Progressing

## 2018-07-18 NOTE — PROGRESS NOTES
Progress Note - Neurology   Rona Pandey 80 y o  female MRN: [id number]  Unit/Bed#: [facility] 821-92 Encounter: [id number]    Assessment and Plan:  1  Focal Motor Status Epilepticus  -continued LLE focal motor events despite AED regimen of keppra, vimpat, and carbamazepine; however patient is now NPO 2/2 extreme lethargy and unsafe swallowing per speech eval  -will initiate fosphenytoin 1175mg IV and check level 3-4 hours after given, monitor BP and HR closely (every 5-10 minutes) while infusing  -continue with IV keppra 750mg Q12 and vimpat 100mg Q12  -continue vEEG, please see epileptologist's interpretation and documentation  -MRI of the brain to be completed once video EEG monitoring completed  -seizure precautions    2  DDD of thoracic spine  -CT completed noting multi-level degenerative changes, but no cord involvement  -pt does seem to be quite uncomfortable, especially with movement  -consider short dose of steroids to treat pain/inflammation, will discuss further with attending    3  UTI  -complete Keflex as ordered      Subjective:   Yanira Lopez is a 80 y  o  left handed female with history of amaurosis fugax in 11/2016, ICH in 6/2016, traumatic SAH in 2/2018 after falling when shoveling snow, hypertension, hyperlipidemia,  who present to the hospital on 07/12/2018 secondary to complaints of generalized weakness with inability to get out of her chair which started the day prior  Patient was found to have UTI and is currently being treated for this  Patient developed left upper extremity shaking which resolved, followed by a left lower extremity shaking 2 days later  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  Seen in bed today, lethargic but able to be aroused by voice  She is able to follow simple commands  She continues with persistent LLE twitching/focal motor activity- confirmed by EEG, despite use of vimpat, keppra, and 1 dose of PO carbamazepine   Pt was evaluated by speech and is now NPO due to persistent lethargy and inability to swallow safely  ROS:  Overall limited 2/2 mental status/lethargy however she did complain of left arm pain and was grimacing and saying "ow" repeatedly during my exam    Vitals: Blood pressure (!) 111/48, pulse 61, temperature 97 9 °F (36 6 °C), temperature source Axillary, resp  rate 18, height 5' 2" (1 575 m), weight 64 6 kg (142 lb 6 7 oz), SpO2 96 %, not currently breastfeeding  ,Body mass index is 26 05 kg/m²  Physical Exam:   Constitutional: She is oriented to person only today-able to tell me her name but then stopped answering questions  She appears well-developed and well-nourished  No distress  Elderly female right sidlying in bed  Left lower extremity jerking noted upon arrival   HENT:   Head: Normocephalic and atraumatic  Eyes: Conjunctivae and EOM are normal  Pupils are equal, round, and reactive to light  Right eye exhibits no discharge  Left eye exhibits no discharge  No scleral icterus  Neck: Normal range of motion  Neck supple  Cardiovascular: Normal rate and regular rhythm  Exam reveals no gallop and no friction rub  No murmur heard  Pulmonary/Chest: Effort normal and breath sounds normal  No stridor  No respiratory distress  She has no wheezes  She has no rales  She exhibits no tenderness  Musculoskeletal: Normal range of motion  She exhibits no edema, tenderness or deformity  Lymphadenopathy:     She has no cervical adenopathy  Neurological: She is alert and oriented to person, place, and time  Skin: Skin is warm and dry  No rash noted  No erythema  Psychiatric: lethargic     Neurologic Exam      Mental Status   Overall very lethargic  Oriented to person- stopped answering after telling me her name  Follows 1 step commands, although inconsistently     Level of consciousness: lethargic  Knowledge: unable to evaluate      Cranial Nerves      CN II   Visual acuity: not evaluated     CN III, IV, VI   Pupils are equal, round, and reactive to light  Extraocular motions are normal    Conjugate gaze: present     CN V   Not evaluated     CN VII   Facial expression full, symmetric       Motor Exam   Muscle bulk: decreased  Patient's motor exam limited by pain in her left shoulder/arm  She continues to only be able to elevate bilateral upper extremities to approximately 45°   strength is intact bilaterally  Right dorsi and plantar flexion 5; left dorsi and plantar flexion limited secondary to continuous jerking of left le/foot       Sensory Exam   Not able to be evaluated 2/2 lethargy     Gait, Coordination, and Reflexes   Mild amplitude, continuous jerking of LLE       Lab, Imaging and other studies:   Recent Results (from the past 24 hour(s))   Carbamazepine level, total    Collection Time: 07/18/18  5:04 AM   Result Value Ref Range    Carbamazepine Lvl 3 1 (L) 4 0 - 12 0 ug/mL   Phenytoin level, total    Collection Time: 07/18/18 12:02 PM   Result Value Ref Range    Phenytoin Lvl <0 4 (L) 10 0 - 20 0 ug/mL       VTE Prophylaxis: Heparin

## 2018-07-18 NOTE — PROGRESS NOTES
Critical Care Transfer Acceptance Note    aKrel Del Valle 80 y o  female MRN: [id number]    Unit/Bed#: [facility] 718-01 Encounter: [id number]    Impression:  1  Acute toxic Metabolic encephalopathy, suspected 2/2 AED medication  2  Focal Motor status epilepticus  3  Sinus bradycardia, resolved  4  Hypotension (082'Y systolic), s/p cerbryx infusion  5  E coli UTI  6  Baseline ambulatory dysfunction  7  Hyperlipidemia  8  Prior h/o amaurosis fugox 2016  9  H/o traumatic [facility] 2/2018  10  DDD w/o cord compression on recent CT    Plan:  1  Toxic Metabolic Encephalopathy: Will obtain STAT head CT to r/o intracranial abnormality and stat ABG to r/o hypercapnia  Discussed w/ Dr Aline Powell of neurology-->highly suspect the encephalopathic picture is 2/2 recent AEDs (18mg/kg fosphenytoin bolus @ 1130 today) and they will change regimen/dosing  Will transfer patient to ICU for closer monitoring  Currently GCS 9 (E-1, V-4, M-4)  Limited exam 2/2 encephalopathy, is withdrawing equally in all extremities  Neuro checks q1hr, NPO for now  Continue vEEG  May need to obtain enteral access for AED administration given NPO status  2  Focal Motor status epilepticus: continue vEEG, Current Regimen: vimpat 100mg q12  F/u neuro/epileptogy recommendations  I spoke w/ neuro Dr Aline Powell and epileptology Dr Diallo Hamper continue vEEG  3  Sinus bradycardia (resolved)- this was s/p fosphenytoin bolus  Monitor  4  Hypotension-resolved  This was s/p fosphenytoin  Monitor  5  Ecoli UTI: day #7/7 antibiotics  Given NPO status, will switch keflex to ceftriaxone for today's dose  6  DDD w/o cord compression: will consider short pulsed dose steroids  Family Update: Notified pt's son and America Iqbal [phone]  He is aware of the patient being transferred to the ICU and plan of care  Advised that patient has an advanced directive which advises she is DNR/DNI  She is currently listed as FULL CODE   I reviewed to see if we had her advanced directive on file and it does not appear we do  The son will bring the advanced directive in tonight so we can make a copy  I will change her code status to DNR/DNI  Counseling / Coordination of Care: Total Critical Care time spent 50 minutes excluding procedures, teaching and family updates  ______________________________________________________________________    Chief Complaint: Encephalopathy    Recent Events / Nursing Concern:   Admitted  from home 2/2 generalized weakness and inability to get out of her chair which started the day prior  She was found to have an E  Coli UTI, mild REINIER and a persistent LLE tremor on exam  Neurology was consulted and she was placed on vEEG  It was found that she is having focal motor status epilepticus  No prior h/o seizures  Received keppra, vimpat, carbamazepine yesterday and was initiated on fosphenytoin bolus of 1175mg IV today  S/p fosphenytoin bolus, she became transiently hypotensive in the 'V systolic and bradycardia which has since resolved  She has been significantly obtunded since phosphytoin administration at 1130am today  Critical care was requested to evaluate the patient  Vitals:   Vitals:    18 1243 18 1251 18 1324 18 1549   BP: 154/62 163/66 118/51 142/57   BP Location: Right arm Left arm Left arm Left arm   Pulse: 67 81 65 64   Resp:  20 20 16   Temp:    97 6 °F (36 4 °C)   TempSrc:    Oral   SpO2:  96% 95% 100%   Weight:       Height:                 Temperature: Temp (24hrs), Av °F (36 7 °C), Min:97 5 °F (36 4 °C), Max:98 5 °F (36 9 °C)  Current: Temperature: 97 6 °F (36 4 °C)    Hemodynamic Monitoring:  N/A       Respiratory:  SpO2: SpO2: 100 %       Physical Exam:  Physical Exam   Constitutional: Vital signs are normal    HENT:   Head: Normocephalic  Eyes: Conjunctivae are normal  Pupils are equal, round, and reactive to light  Pupils 4mm reactive   Neck: No JVD present     Cardiovascular: Normal rate, regular rhythm, intact distal pulses and normal pulses  Pulmonary/Chest: Effort normal and breath sounds normal    Abdominal: Soft  Normal appearance  Neurological: She is unresponsive  GCS eye subscore is 1  GCS verbal subscore is 4  GCS motor subscore is 4  Limited exam 2/2 encephalopathy  Withdrawals to pain in all extremities  Says "no" with tactile stimuli  Fine left foot tremor evident whenever stimulated  Skin: Skin is warm, dry and intact  Nursing note and vitals reviewed  Allergies: No Known Allergies    Medications:   Scheduled Meds:  Current Facility-Administered Medications:  acetaminophen 650 mg Rectal Q4H PRN Nando Hdez DO    [START ON 7/19/2018] calcium carbonate-vitamin D 1 tablet Oral Daily With Breakfast Nando Hdez DO    heparin (porcine) 5,000 Units Subcutaneous Q8H [facility] Sandy Higginbotham MD    lacosamide (VIMPAT) IVPB 100 mg Intravenous Q12H Jennifer Escalante DO Last Rate: Stopped (07/18/18 1104)   methocarbamol 750 mg Oral HS Edy Faustin PA-C    ondansetron 4 mg Intravenous Q6H PRN Sandy Higginbotham MD    ondansetron 4 mg Oral Once Anila Alicia PA-C    pravastatin 80 mg Oral Daily With YUM! Rohit Anthony DO    senna 1 tablet Oral Daily Sandy Higginbotham MD      Continuous Infusions:   PRN Meds:    acetaminophen 650 mg Q4H PRN   ondansetron 4 mg Q6H PRN       Labs:     Results from last 7 days  Lab Units 07/15/18  0551 07/14/18  0445 07/13/18  0451 07/12/18  1906   WBC Thousand/uL 4 30* 6 19  --  5 36   HEMOGLOBIN g/dL 14 2 13 9  --  13 3   HEMATOCRIT % 43 9 44 4  --  40 8   PLATELETS Thousands/uL 169 165 158 172   NEUTROS PCT % 49 64  --  60   MONOS PCT % 10 10  --  9       Results from last 7 days  Lab Units 07/15/18  0551 07/14/18  0445 07/13/18  1406  07/12/18  1906   SODIUM mmol/L 142 143 139  < > 140   POTASSIUM mmol/L 4 0 3 6 3 6  < > 3 6   CHLORIDE mmol/L 108 108 104  < > 103   CO2 mmol/L 27 28 28  < > 29   BUN mg/dL 13 15 14  < > 21   CREATININE mg/dL 0  83 0 94 0 87  < > 1 30   CALCIUM mg/dL 9 3 9 0 9 4  < > 9 6   TOTAL PROTEIN g/dL  --   --   --   --  6 5   BILIRUBIN TOTAL mg/dL  --   --   --   --  0 41   ALK PHOS U/L  --   --   --   --  88   ALT U/L  --   --   --   --  22   AST U/L  --   --   --   --  17   GLUCOSE RANDOM mg/dL 101 107 130  < > 130   < > = values in this interval not displayed  Results from last 7 days  Lab Units 07/12/18  1936   INR  1 07   PTT seconds 29           0  Lab Value Date/Time   TROPONINI <0 02 07/12/2018 1906   TROPONINI 0 02 02/10/2018 1510   TROPONINI 0 04 02/09/2018 1848   TROPONINI 0 02 11/07/2016 1809   TROPONINI <0 02 06/21/2016 1833   TROPONINI <0 02 06/21/2016 1308   TROPONINI <0 02 06/20/2016 1836           Diagnostic Imaging / Data: I have personally reviewed pertinent reports  CT Spine (7/16)   No cervical spine fracture or traumatic malalignment  Moderate to marked multilevel spondylotic degenerative changes of the cervical spine as described above with severe bilateral neural foraminal narrowing at C4/C5 and C5/C6  No significant spinal canal stenosis  Stable 2 3 cm left thyroid lobe nodule   Again, a nonemergent thyroid ultrasound is recommended for further characterization  Considerations related to the patient's age and/or comorbidities may be used to alter these recommendations     CT Thoracic (7/16) Mild multilevel spondylotic degenerative changes of the thoracic spine causing no significant spinal canal stenosis or neural foraminal narrowing  No fracture or subluxation  MRI Lumbar (7/16) Mild smooth levoscoliosis is grossly unchanged   Slight progression of lumbar degenerative disc disease with slight worsening of canal stenosis and foraminal narrowing described in detail above  Fusion of the endplates at T8-O6 with mild foraminal narrowing on the left  CT Head (7/12) No acute intracranial abnormality  Mild chronic small vessel ischemic changes and volume loss  Small focus of encephalomalacia at the left frontoparietal junction, the sequela of prior hemorrhage   No new hemorrhage  CT Abdomen (7/12) 1   Diverticulosis without evidence of diverticulitis or colitis   No bowel obstruction  2   Cholelithiasis   No evidence of cholecystitis or biliary obstruction  3   Numerous nonobstructing right renal calculi measuring between 5 and 14 mm   Nonobstructing left renal calculi measuring between 2 and 5 mm  EKG: NSR  Code Status: Level 1 - Full Code    Portions of the record may have been created with voice recognition software  Occasional wrong word or "sound a like" substitutions may have occurred due to the inherent limitations of voice recognition software  Read the chart carefully and recognize, using context, where substitutions have occurred      SIGNATURE: Kenneth Joshi  DATE: July 18, 2018  TIME: 4:12 PM

## 2018-07-18 NOTE — PHYSICAL THERAPY NOTE
Physical Therapy Re-Evaluation:       18 1030   Pain Assessment   Pain Assessment FLACC   Pain Rating: FLACC (Rest) - Face 0   Pain Rating: FLACC (Rest) - Legs 0   Pain Rating: FLACC (Rest) - Activity 0   Pain Rating: FLACC (Rest) - Cry 0   Pain Rating: FLACC (Rest) - Consolability 0   Score: FLACC (Rest) 0   Pain Rating: FLACC (Activity) - Face 1   Pain Rating: FLACC (Activity) - Legs 1   Pain Rating: FLACC (Activity) - Activity 1   Pain Rating: FLACC (Activity) - Cry 1   Pain Rating: FLACC (Activity) - Consolability 2   Score: FLACC (Activity) 6   Restrictions/Precautions   Other Precautions Hard of hearing;Pain; Fall Risk;Multiple lines; Bed Alarm;Cognitive;Agitated   General   Chart Reviewed Yes   Additional Pertinent History PT re-eval initiated   Family/Caregiver Present No   Cognition   Overall Cognitive Status Impaired   Arousal/Participation Poorly responsive   Attention Difficulty attending to directions   Orientation Level Oriented to person  (contantly repeating  when asking current date/year)   Following Commands Unable to follow one step commands   Subjective   Subjective PT re-eval initiated;pt supine in bed resting comfortably;pt willing and agreeable to work with PT and to participate in therapy intervention;"I can try to sit up";lethargic,responsive to verbal instruction 25% of the time and B eyes remain closed majority of therapy session   Bed Mobility   Rolling R 1  Dependent   Additional items Assist x 1; Increased time required;Verbal cues;LE management  (for repositioning and rotation of green wedge pillows)   Rolling L 1  Dependent   Additional items Assist x 1; Increased time required;Verbal cues;LE management  (for repositioning and rotation of green wedge pillows)   Supine to Sit 1  Dependent   Additional items Assist x 2; Increased time required;Verbal cues;LE management;HOB elevated   Sit to Supine 1  Dependent   Additional items Assist x 2; Increased time required;Verbal cues;LE management   Transfers   Sit to Stand Unable to assess  (pt has zero static sit balance at EOB)   Additional Comments pt currently is appropriate for bedlevel PT services at this time;pt is lethargic,poorly responsive and needs depAx2 for mobility and care  NSG aware   Ambulation/Elevation   Gait Assistance Not tested   Balance   Static Sitting Zero   Endurance Deficit   Endurance Deficit Yes   Endurance Deficit Description poorly responsive,pain,fatigue,lethargic   Activity Tolerance   Activity Tolerance Patient limited by fatigue;Patient limited by pain;Treatment limited secondary to medical complications (Comment)  (poor)   Medical Staff Made Aware DARIN Ledesma)   Nurse Made Aware yes   Exercises   Heelslides Supine;10 reps;PROM; Bilateral   Hip Flexion Supine;10 reps;PROM; Bilateral   Hip Abduction Supine;10 reps;PROM; Bilateral   Hip Adduction Supine;10 reps;PROM; Bilateral   Equipment Use   Comments 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 via non PT staff   Assessment   Prognosis Guarded   Problem List Decreased strength;Decreased range of motion;Decreased endurance; Impaired balance;Decreased mobility; Decreased cognition;Decreased safety awareness;Decreased skin integrity;Pain   Assessment PT re-eval complete and performed 2* change in medical status and mental status per NSG  Pt demonstrates dependent deficits during mobility including dec endurance,dec static sit balance,FLACC score with pt grimacing in pain during PROM BLE and mobility,dependent for B rolling and BM  Pt scores zero for static sit balance at EOB with dec ability to follow simple one step commands,minimal participation in therapy intervention and inc pain  Pt needed dep Ax1 for repositioning in supine and proper positioning of green wedge pillows for comfort and pressure relief  Pt needed PROM for BLE supine in bed  Pt would cont to benefit from skilled inpt PT bedlevel therapy interventions to maximaize functional independence  NSG and CM aware of treatment and recommendations at this time  Goals   Patient Goals to rest and to stop this madness   STG Expiration Date 07/28/18   Short Term Goal #1 in 7-10 days:pt will be able to participate in 75% of therapy intervention and able to follow simple one step commands 75% of the time,able to perform B rolling and BM with initiation and participation with verbal and physical instruction given needing maxAx1,activity tolerance:45mins/45mins,perform OOB and further mobility (gait and transfers) by PT when pt is available to perform mobility,AAROM BLE ther ex HEP consistently,assist with repositioning and positioning of proper equipment for pt comfort and to prevent skin breakdown   Treatment Day 2   Plan   Treatment/Interventions LE strengthening/ROM; Therapeutic exercise; Endurance training;Patient/family training;Bed mobility;Spoke to nursing;Spoke to case management   Progress Slow progress, medical status limitations   PT Frequency 2-3x/wk   Recommendation   Recommendation Short-term skilled PT;Long-term skilled nursing home placement  (LTC vs inpt rhb PENDING mobility progress)   Equipment Recommended Other (Comment)  (TBD)   Skilled PT recommended while in hospital and upon DC to progress pt toward treatment goals

## 2018-07-18 NOTE — PROGRESS NOTES
Dr Amelia Mullins made aware patient became bradycardiac with a HR in the high 40's  Patient's SBP elevated in the 170's  Patient is nonresponsive and not responding to commands   No further orders given    Will continue to monitor patient

## 2018-07-19 ENCOUNTER — APPOINTMENT (INPATIENT)
Dept: NEUROLOGY | Facility: AMBULATORY SURGERY CENTER | Age: 83
DRG: 682 | End: 2018-07-19
Payer: MEDICARE

## 2018-07-19 ENCOUNTER — APPOINTMENT (INPATIENT)
Dept: RADIOLOGY | Facility: HOSPITAL | Age: 83
DRG: 682 | End: 2018-07-19
Payer: MEDICARE

## 2018-07-19 LAB
ANION GAP SERPL CALCULATED.3IONS-SCNC: 7 MMOL/L (ref 4–13)
BASOPHILS # BLD AUTO: 0.02 THOUSANDS/ΜL (ref 0–0.1)
BASOPHILS NFR BLD AUTO: 0 % (ref 0–1)
BUN SERPL-MCNC: 23 MG/DL (ref 5–25)
CALCIUM SERPL-MCNC: 9.5 MG/DL (ref 8.3–10.1)
CHLORIDE SERPL-SCNC: 108 MMOL/L (ref 100–108)
CO2 SERPL-SCNC: 29 MMOL/L (ref 21–32)
CREAT SERPL-MCNC: 0.9 MG/DL (ref 0.6–1.3)
EOSINOPHIL # BLD AUTO: 0.11 THOUSAND/ΜL (ref 0–0.61)
EOSINOPHIL NFR BLD AUTO: 2 % (ref 0–6)
ERYTHROCYTE [DISTWIDTH] IN BLOOD BY AUTOMATED COUNT: 14.2 % (ref 11.6–15.1)
GFR SERPL CREATININE-BSD FRML MDRD: 57 ML/MIN/1.73SQ M
GLUCOSE SERPL-MCNC: 99 MG/DL (ref 65–140)
HCT VFR BLD AUTO: 48.8 % (ref 34.8–46.1)
HGB BLD-MCNC: 15.2 G/DL (ref 11.5–15.4)
IMM GRANULOCYTES # BLD AUTO: 0.01 THOUSAND/UL (ref 0–0.2)
IMM GRANULOCYTES NFR BLD AUTO: 0 % (ref 0–2)
LYMPHOCYTES # BLD AUTO: 1.02 THOUSANDS/ΜL (ref 0.6–4.47)
LYMPHOCYTES NFR BLD AUTO: 17 % (ref 14–44)
MAGNESIUM SERPL-MCNC: 2.6 MG/DL (ref 1.6–2.6)
MCH RBC QN AUTO: 29.2 PG (ref 26.8–34.3)
MCHC RBC AUTO-ENTMCNC: 31.1 G/DL (ref 31.4–37.4)
MCV RBC AUTO: 94 FL (ref 82–98)
MONOCYTES # BLD AUTO: 0.43 THOUSAND/ΜL (ref 0.17–1.22)
MONOCYTES NFR BLD AUTO: 7 % (ref 4–12)
NEUTROPHILS # BLD AUTO: 4.41 THOUSANDS/ΜL (ref 1.85–7.62)
NEUTS SEG NFR BLD AUTO: 74 % (ref 43–75)
NRBC BLD AUTO-RTO: 0 /100 WBCS
PHOSPHATE SERPL-MCNC: 2.9 MG/DL (ref 2.3–4.1)
PLATELET # BLD AUTO: 175 THOUSANDS/UL (ref 149–390)
PMV BLD AUTO: 9.5 FL (ref 8.9–12.7)
POTASSIUM SERPL-SCNC: 3.8 MMOL/L (ref 3.5–5.3)
RBC # BLD AUTO: 5.2 MILLION/UL (ref 3.81–5.12)
SODIUM SERPL-SCNC: 144 MMOL/L (ref 136–145)
WBC # BLD AUTO: 6 THOUSAND/UL (ref 4.31–10.16)

## 2018-07-19 PROCEDURE — 74018 RADEX ABDOMEN 1 VIEW: CPT

## 2018-07-19 PROCEDURE — 80048 BASIC METABOLIC PNL TOTAL CA: CPT | Performed by: INTERNAL MEDICINE

## 2018-07-19 PROCEDURE — 83735 ASSAY OF MAGNESIUM: CPT | Performed by: INTERNAL MEDICINE

## 2018-07-19 PROCEDURE — 99291 CRITICAL CARE FIRST HOUR: CPT | Performed by: INTERNAL MEDICINE

## 2018-07-19 PROCEDURE — 93005 ELECTROCARDIOGRAM TRACING: CPT

## 2018-07-19 PROCEDURE — 92526 ORAL FUNCTION THERAPY: CPT

## 2018-07-19 PROCEDURE — 95951 PR EEG MONITORING/VIDEORECORD: CPT | Performed by: PSYCHIATRY & NEUROLOGY

## 2018-07-19 PROCEDURE — C9113 INJ PANTOPRAZOLE SODIUM, VIA: HCPCS | Performed by: PHYSICIAN ASSISTANT

## 2018-07-19 PROCEDURE — 85025 COMPLETE CBC W/AUTO DIFF WBC: CPT | Performed by: INTERNAL MEDICINE

## 2018-07-19 PROCEDURE — 84100 ASSAY OF PHOSPHORUS: CPT | Performed by: INTERNAL MEDICINE

## 2018-07-19 PROCEDURE — 95951 HB EEG MONITORING/VIDEORECORD: CPT

## 2018-07-19 PROCEDURE — 99233 SBSQ HOSP IP/OBS HIGH 50: CPT | Performed by: PSYCHIATRY & NEUROLOGY

## 2018-07-19 PROCEDURE — 71045 X-RAY EXAM CHEST 1 VIEW: CPT

## 2018-07-19 RX ORDER — OLANZAPINE 2.5 MG/1
2.5 TABLET ORAL
Status: DISCONTINUED | OUTPATIENT
Start: 2018-07-19 | End: 2018-07-20

## 2018-07-19 RX ORDER — POTASSIUM CHLORIDE 14.9 MG/ML
20 INJECTION INTRAVENOUS ONCE
Status: COMPLETED | OUTPATIENT
Start: 2018-07-19 | End: 2018-07-19

## 2018-07-19 RX ORDER — SODIUM CHLORIDE 9 MG/ML
10 INJECTION, SOLUTION INTRAVENOUS ONCE
Status: COMPLETED | OUTPATIENT
Start: 2018-07-19 | End: 2018-07-19

## 2018-07-19 RX ORDER — DIVALPROEX SODIUM 125 MG/1
500 CAPSULE, COATED PELLETS ORAL EVERY 8 HOURS SCHEDULED
Status: DISCONTINUED | OUTPATIENT
Start: 2018-07-20 | End: 2018-07-20

## 2018-07-19 RX ORDER — BISACODYL 10 MG
10 SUPPOSITORY, RECTAL RECTAL DAILY
Status: DISCONTINUED | OUTPATIENT
Start: 2018-07-19 | End: 2018-07-29 | Stop reason: HOSPADM

## 2018-07-19 RX ORDER — LANOLIN ALCOHOL/MO/W.PET/CERES
3 CREAM (GRAM) TOPICAL
Status: DISCONTINUED | OUTPATIENT
Start: 2018-07-19 | End: 2018-07-27

## 2018-07-19 RX ADMIN — HEPARIN SODIUM 5000 UNITS: 5000 INJECTION, SOLUTION INTRAVENOUS; SUBCUTANEOUS at 14:59

## 2018-07-19 RX ADMIN — VALPROATE SODIUM 500 MG: 100 INJECTION, SOLUTION INTRAVENOUS at 20:03

## 2018-07-19 RX ADMIN — POTASSIUM CHLORIDE 20 MEQ: 200 INJECTION, SOLUTION INTRAVENOUS at 08:00

## 2018-07-19 RX ADMIN — OLANZAPINE 2.5 MG: 2.5 TABLET, FILM COATED ORAL at 23:57

## 2018-07-19 RX ADMIN — Medication 100 MG: at 11:30

## 2018-07-19 RX ADMIN — MELATONIN TAB 3 MG 3 MG: 3 TAB at 22:15

## 2018-07-19 RX ADMIN — SODIUM CHLORIDE 10 ML/HR: 0.9 INJECTION, SOLUTION INTRAVENOUS at 14:41

## 2018-07-19 RX ADMIN — PHENYTOIN SODIUM 100 MG: 50 INJECTION INTRAMUSCULAR; INTRAVENOUS at 06:39

## 2018-07-19 RX ADMIN — PANTOPRAZOLE SODIUM 40 MG: 40 INJECTION, POWDER, FOR SOLUTION INTRAVENOUS at 08:22

## 2018-07-19 RX ADMIN — HEPARIN SODIUM 5000 UNITS: 5000 INJECTION, SOLUTION INTRAVENOUS; SUBCUTANEOUS at 06:38

## 2018-07-19 RX ADMIN — HEPARIN SODIUM 5000 UNITS: 5000 INJECTION, SOLUTION INTRAVENOUS; SUBCUTANEOUS at 22:15

## 2018-07-19 RX ADMIN — PHENYTOIN SODIUM 100 MG: 50 INJECTION INTRAMUSCULAR; INTRAVENOUS at 23:04

## 2018-07-19 RX ADMIN — Medication 100 MG: at 21:58

## 2018-07-19 RX ADMIN — SODIUM CHLORIDE, SODIUM GLUCONATE, SODIUM ACETATE, POTASSIUM CHLORIDE, MAGNESIUM CHLORIDE, SODIUM PHOSPHATE, DIBASIC, AND POTASSIUM PHOSPHATE 65 ML/HR: .53; .5; .37; .037; .03; .012; .00082 INJECTION, SOLUTION INTRAVENOUS at 00:04

## 2018-07-19 RX ADMIN — VALPROATE SODIUM 500 MG: 100 INJECTION, SOLUTION INTRAVENOUS at 15:00

## 2018-07-19 RX ADMIN — PHENYTOIN SODIUM 100 MG: 50 INJECTION INTRAMUSCULAR; INTRAVENOUS at 14:41

## 2018-07-19 RX ADMIN — Medication 100 MG: at 00:01

## 2018-07-19 NOTE — PROGRESS NOTES
Patient evaluated at [phone]  Patient was sleeping upon arrival though awakes easily to her name  Patient is drowsy however able to maintain an alert state for several minutes and was oriented x3  We will plan to proceed with the previous plan of initiating [facility]  Please see previous note for details  Continue to monitor closely and obtain AED levels as ordered

## 2018-07-19 NOTE — PHYSICAL THERAPY NOTE
Physical Therapy Cancellation Note      PATIENT TRANSFERRED TO ICU OVER NIGHT FROM P7  PT ATTEMPTED RE-EVALUATION HOWEVER PATIENT CURRENTLY UNDERGOING EEG MONITORING AND PRESENTS AS SIGNIFICANTLY LETHARGIC  PT WILL CONTINUE TO FOLLOW ON CASELOAD AND PERFORM INITIAL EVALUATION AS MEDICALLY APPROPRIATE      Julieth Gutiérrez, PT

## 2018-07-19 NOTE — PROGRESS NOTES
Patient was found to be not appropriate for PO  I discussed this at length with the son Gale Fletcher as well as the patient  Both agree that a Delwin Blaise was appropriate  Delwin Blaise was placed Xrays were done to confirm placement, and feedings were started  Spoke with Neurology who will continue to manage patient's Anti Epileptic Medications and we both agree that she will benefit with ICU level monitoring during this titration period of multiple Medications in the setting of her recent hypotension  Once patient no longer requires ICU level monitoring, will transfer patient back to Neurology Unit

## 2018-07-19 NOTE — PROGRESS NOTES
Progress Note - Neurology   Thee Melgar 80 y o  female [id number]  Unit/Bed#: ICU 01/ICU 01    Assessment:  Devyn omalley 80 y  o  left handed female with history of amaurosis fugax in 11/2016, ICH in 6/2016, traumatic SAH in 2/2018 after falling when shoveling snow, hypertension, hyperlipidemia,  who present to the hospital on 07/12/2018 secondary to complaints of generalized weakness with inability to get out of her chair which started the day prior  Patient was found to have UTI and is currently being treated for this  Patient developed left upper extremity shaking which resolved, followed by a left lower extremity shaking 2 days later  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  Plan:  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  Persistent left foot jerking, though amplitude has decreased  Spoke with the patient's son , Severino Elise, at length  He is in agreement with proceeding with AED to treatment to try to break seizure, while monitoring respiratory status and neurologic exam closely  Per ICU team, will plan to place an NGT for oral administration of AEDs if available  Continue video EEG monitoring  vEEG revealed  Continuous periodic epileptiform discharges with focal slowing over the right parasagittal region to posterior quadrant associated with focal left foot jerking  CT head negative for acute abnormality  Repeat stat CTH negative for acute abnormality  MRI brain with and without contrast pending  We will monitor the patient's clinical course over the next 24 hours  If her seizure persists, we may consider unhooking the patient from vEEG and obtaining an MRI, it is likely that we will need to continue to do EEG monitoring for several days  Current AED regimen:   - Lacosamide 100mg q 12 hours   - Phenytoin 100mg q 8 hours - will likely change to PO on 7/20  Will plan to re-evaluate at 1330   If patient's neurologic exam is stable or improved and seizure cotinues to persist, will add Depacon/Depakote as stated below:   - Depacon 500mg q6 hours x4 doses (slow load)   - Followed by Depakote 500mg q 8hrs for maintenance  If patient doing well, will consider discontinuing Vimpat on Saturday 7/21  Will obtain the following:   - Phenytoin level on 7/20 at 1330   - Depacon level 7/21 prior to AM dose   - Phenytoin and Depacon level on 7/23 prior to AM dose  Tele  Seizure precautions    Encephalopathy  Highly likely that this is secondary to AED regimen  Frequent neurological checks  Stat CT head with acute decline of in exam/mental status  Notify neurology with any changes in examination  R 3rd and 4th digit distal pallor  Resolved  Dopplers positive  Critical team aware  RN monitoring closely  Ecoli UTI  Treatment completed s/p Ceftriaxone? Keflex x 7 days    Generalized weakness and ambulatory dysfunction  OT/PT  Recommending STR    Subjective:   Monique Hoff is a 80 y o  left handed female with history of amaurosis fugax in 11/2016, hypertension, hyperlipidemia, traumatic SAH in 2/2018 after falling when shoveling snow who presented to the hospital on 07/12/2018 secondary to complaints of generalized weakness with inability to get out of her chair   CT head was negative for acute abnormality   A small focus of encephalomalacia at the left frontoparietal junction, the sequelae of prior hemorrhage, was noted  UA noted to be positive  Patient also with c/o LLQ discomfort  CT A/P revealed diverticula and nonobstructive nephrolithiasis  Antibiotics were started  Patient developed left upper extremity shaking which resolved, followed by a left lower extremity shaking 2 days later  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  Keppra was initiated   Without significant improvement   Fosphenytoin was added and after infusion the patient was noted to become hypotensive with subsequent hypertension and bradycardia, as well as increased somnolence  Stat CT head was negative for acute abnormality  Due to the symptoms and the persistent nature of her depressed mentation, the patient was transferred to the ICU  Keppra was subsequently discontinued as it did not seem to have a significant effect on her seizure activity  Current AED regimen is with Lacosamide and Phenytoin  Today on exam, the patient was oriented to person, place and month  She was complaining of burning in her peripheral IV site  The nurse is aware  Patient currently receiving IV potassium  Rate was adjusted  Patient denies any other complaints  Towards the end of exam, patient fell asleep  Nursing noted that patient seemed to be more alert today though she was perseverating this morning  Just prior to my arrival,  The patient was noted to have pallor of her 3rd and 4th distal digits of her right hand  ICU team was notified  After about 5-10 minutes color started to return  Pulses were palpable and confirmed with doppler  Nursing to monitor closely      Past Medical History:   Diagnosis Date    Amaurosis fugax 11/18/2016    Arthritis     History of subarachnoid hemorrhage     Hyperlipidemia     Hypertension     Nephrolithiasis     Thyroid nodule 7/17/2018     Past Surgical History:   Procedure Laterality Date    LITHOTRIPSY      VT FRAGMENT KIDNEY STONE/ ESWL Left 1/3/2017    Procedure: LITHROTRIPSY EXTRACORPORAL SHOCKWAVE (ESWL); Surgeon: Sasha Mendoza MD;  [facility];  Service: Urology    VT FRAGMENT KIDNEY STONE/ ESWL Right 11/18/2016    Procedure: Kathlen Litten SHOCKWAVE (ESWL);   Surgeon: Sasha Mendoza MD;  [facility];  Service: Urology    URETERAL STENT PLACEMENT Bilateral 11/9/2016    Procedure: Roxanne Huang; STENT INSERTION ;  Surgeon: Sasha Mendoza MD;  [facility];  Service:      Family history:  Family History   Problem Relation Age of Onset    Pneumonia Mother     Cancer Sister  Prostate cancer Brother        Social History     Social History    Marital status:      Spouse name: N/A    Number of children: N/A    Years of education: N/A     Social History Main Topics    Smoking status: Never Smoker    Smokeless tobacco: Never Used    Alcohol use No    Drug use: No    Sexual activity: Not Asked     Other Topics Concern    None     Social History Narrative    None       Scheduled Meds:  Current Facility-Administered Medications:  acetaminophen 650 mg Rectal Q4H PRN Andressa Han,     heparin (porcine) 5,000 Units Subcutaneous Q8H [address] Sandy Higginbotham MD    lacosamide (VIMPAT) IVPB 100 mg Intravenous Q12H Jennifer Devarinti, DO Last Rate: Stopped (07/19/18 0031)   multi-electrolyte 65 mL/hr Intravenous Continuous Ming Welsh MD Last Rate: 65 mL/hr (07/19/18 0004)   ondansetron 4 mg Intravenous Q6H PRN Sandy Eldridge MD    ondansetron 4 mg Oral Once Anila Alicia PA-C    phenytoin 100 mg Intravenous Q8H Jennifer Devarinti, DO      Continuous Infusions:  multi-electrolyte 65 mL/hr Last Rate: 65 mL/hr (07/19/18 0004)     PRN Meds:   acetaminophen    ondansetron    ROS: Unable to be obtained ( other than complaints of peripheral IV irritation) due to AMS  Vitals: BP (!) 122/45   Pulse 62   Temp 98 3 °F (36 8 °C) (Rectal)   Resp 13   Ht 5' 2" (1 575 m)   Wt 64 6 kg (142 lb 6 7 oz)   SpO2 100%   BMI 26 05 kg/m²     Physical Exam:   Physical Exam   Constitutional: She appears well-developed and well-nourished  No distress  Elderly female right side-lying upon arrival  Continuous left foot jerking noted on arrival, though decreased amplitude from prior visits   HENT:   Head: Normocephalic and atraumatic  Eyes: Conjunctivae are normal  Pupils are equal, round, and reactive to light  Right eye exhibits no discharge  Left eye exhibits no discharge  No scleral icterus  Neck: Normal range of motion  Neck supple  Cardiovascular: Normal rate and regular rhythm    Exam reveals no gallop and no friction rub  No murmur heard  Pulmonary/Chest: Effort normal and breath sounds normal  No stridor  No respiratory distress  She has no wheezes  She has no rales  She exhibits no tenderness  Musculoskeletal: She exhibits no edema, tenderness or deformity  Lymphadenopathy:     She has no cervical adenopathy  Skin: Skin is warm and dry  No rash noted  No erythema  Right 3rd and 4th digit distal pallor noted upon arrival however color improved 5-10 minutes later     Neurologic Exam     Mental Status   Drowsy  Upon arrival, oriented to person, place and month  Can tell me the name of her son  However when asked the name of her grandson, with whom she lives, she states Lynette Moyer which is not the correct grandson  She then was noted to perseverate on this answer when I was asking her other questions  Intermittently following commands  Cranial Nerves     CN II   Visual acuity: normal (Grossly)    CN III, IV, VI   Pupils are equal, round, and reactive to light  Conjugate gaze: present    CN VII   Facial expression full, symmetric  Patient with difficulty following direction for EOMs  Does track examiner the to left and right  Motor Exam   Muscle bulk: decreased  Decreased ability to follow commands and therefore more limited motor evaluation  RUE:  Full  strength    Unable to evaluate entire arm due to peripheral IV/ pain secondary to potassium infusion    LUE:  Patient able to lift arm antigravity x5 seconds to approximately 70° of shoulder flexion    RLE: grossly 4, able to wiggle toes on command - limited by arousal    LLE: evaluation limited by arousal and seizure, trace toe wiggling     Gait, Coordination, and Reflexes     Reflexes   Right plantar: normal  Left plantar: normal      Labs:  Recent Results (from the past 24 hour(s))   Phenytoin level, total    Collection Time: 07/18/18  1:36 PM   Result Value Ref Range    Phenytoin Lvl 19 0 10 0 - 20 0 ug/mL Blood gas, arterial    Collection Time: 07/18/18  4:17 PM   Result Value Ref Range    pH, Arterial 7 397 7 350 - 7 450    pCO2, Arterial 44 7 (H) 36 0 - 44 0 mm Hg    pO2, Arterial 110 7 75 0 - 129 0 mm Hg    HCO3, Arterial 26 9 22 0 - 28 0 mmol/L    Base Excess, Arterial 1 6 mmol/L    O2 Content, Arterial 19 4 16 0 - 23 0 mL/dL    O2 HGB,Arterial  97 0 94 0 - 97 0 %    SOURCE Radial, Right     Nasal Cannula 2    Basic metabolic panel    Collection Time: 07/19/18  5:00 AM   Result Value Ref Range    Sodium 144 136 - 145 mmol/L    Potassium 3 8 3 5 - 5 3 mmol/L    Chloride 108 100 - 108 mmol/L    CO2 29 21 - 32 mmol/L    Anion Gap 7 4 - 13 mmol/L    BUN 23 5 - 25 mg/dL    Creatinine 0 90 0 60 - 1 30 mg/dL    Glucose 99 65 - 140 mg/dL    Calcium 9 5 8 3 - 10 1 mg/dL    eGFR 57 ml/min/1 73sq m   Magnesium    Collection Time: 07/19/18  5:00 AM   Result Value Ref Range    Magnesium 2 6 1 6 - 2 6 mg/dL   Phosphorus    Collection Time: 07/19/18  5:00 AM   Result Value Ref Range    Phosphorus 2 9 2 3 - 4 1 mg/dL   CBC and differential    Collection Time: 07/19/18  5:00 AM   Result Value Ref Range    WBC 6 00 4 31 - 10 16 Thousand/uL    RBC 5 20 (H) 3 81 - 5 12 Million/uL    Hemoglobin 15 2 11 5 - 15 4 g/dL    Hematocrit 48 8 (H) 34 8 - 46 1 %    MCV 94 82 - 98 fL    MCH 29 2 26 8 - 34 3 pg    MCHC 31 1 (L) 31 4 - 37 4 g/dL    RDW 14 2 11 6 - 15 1 %    MPV 9 5 8 9 - 12 7 fL    Platelets 687 636 - 464 Thousands/uL    nRBC 0 /100 WBCs    Neutrophils Relative 74 43 - 75 %    Immat GRANS % 0 0 - 2 %    Lymphocytes Relative 17 14 - 44 %    Monocytes Relative 7 4 - 12 %    Eosinophils Relative 2 0 - 6 %    Basophils Relative 0 0 - 1 %    Neutrophils Absolute 4 41 1 85 - 7 62 Thousands/µL    Immature Grans Absolute 0 01 0 00 - 0 20 Thousand/uL    Lymphocytes Absolute 1 02 0 60 - 4 47 Thousands/µL    Monocytes Absolute 0 43 0 17 - 1 22 Thousand/µL    Eosinophils Absolute 0 11 0 00 - 0 61 Thousand/µL    Basophils Absolute 0 02 0 00 - 0 10 Thousands/µL     Imaging: I have personally reviewed pertinent imaging and PACS reports       VTE Prophylaxis: Heparin

## 2018-07-19 NOTE — PROGRESS NOTES
Progress Note - Critical Care   Maddy Cherry 80 y o  female MRN: [id number]  Unit/Bed#: ICU 01 Encounter: [id number]    Assessment:   1  Acute toxic Metabolic encephalopathy, suspected 2/2 AED medication  2  Focal Motor status epilepticus  3  Sinus bradycardia, resolved  4  Hypotension (479'W systolic), s/p cerbryx infusion  5  E coli UTI  6  Baseline ambulatory dysfunction  7  Hyperlipidemia  8  Prior h/o amaurosis fugox 2016  9  H/o traumatic [facility] 2/2018  10  DDD w/o cord compression on recent CT    Plan:          Neuro:   Toxic Metabolic Encephalopathy:   Discussed w/ Dr Cindy Urias of neurology-->highly suspect the encephalopathic picture is 2/2 recent AEDs (18mg/kg fosphenytoin bolus @ 1130 today) and they will change regimen/dosing  Neuro checks q1hr, NPO for now  Continue vEEG  Focal Motor status epilepticus:   Continue vEEG, Current Regimen: vimpat 100mg q12  F/u neuro/epileptogy recommendations  CV:   Sinus bradycardia (resolved)- this was s/p fosphenytoin bolus  Monitor  Hypotension-resolved  This was s/p fosphenytoin  Monitor                 Lung:   No active problems at this time                 GI:   Keep patient NPO, will need official speech evaluation for swallow study  FEN:   Patient has isolated running at 65 cc/hour  Monitor electrolytes maintained potassium over 4 phosphate over 3 and magnesium over 2 replace as needed                     :   Patient has a known UTI and was treated with Rocephin                  ID:   Patient was treated for UTI with Rocephin                 Heme:   Heparin Subcutaneous                   Endo:   No Active problems                             Msk/Skin:   Continue to work with PT/OT as applicable                  Disposition:   Continue to monitor in ICU while patient's AED are titrated for close monitoring    Chief Complaint:    Focal motor seizure of the Left lower extremity    HPI/24hr events: Rocio Lopez is a 88 y o  female with history of amaurosis fugax in 11/2016, ICH in 6/2016, traumatic SAH in 2/2018 after falling when shoveling snow, hypertension, hyperlipidemia,  who present to the hospital on 07/12/2018 secondary to complaints of generalized weakness with inability to get out of her chair which started the day prior  Marquise Galeano was found to have UTI and is currently being treated for this   Patient developed left upper extremity shaking which resolved, followed by a left lower extremity shaking 2 days later   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  Patient was transferred to ICU care yesterday for closer monitoring as patient became hypotensive and silas cardia as she was started on AED      Vitals:   Vitals:    07/19/18 0500 07/19/18 0600 07/19/18 0700 07/19/18 0800   BP: 116/92  137/54 142/51   Pulse: 68 76 68 72   Resp: 18 (!) 24 16 (!) 27   Temp:    98 3 °F (36 8 °C)   TempSrc:    Rectal   SpO2: 99% 99% 98% 98%   Weight:       Height:         Temp  Min: 97 2 °F (36 2 °C)  Max: 99 7 °F (37 6 °C)  IBW: 50 1 kg    SpO2: SpO2: 98 %      Intake/Output Summary (Last 24 hours) at 07/19/18 1464  Last data filed at 07/19/18 0800   Gross per 24 hour   Intake           765 67 ml   Output              667 ml   Net            98 67 ml       Invasive/non-invasive ventilation settings:   Respiratory    Lab Data (Last 4 hours)    None         O2/Vent Data (Last 4 hours)    None                Invasive Devices     Peripheral Intravenous Line            Peripheral IV 07/18/18 Right Antecubital less than 1 day                Active medications:    Current Facility-Administered Medications:     acetaminophen (TYLENOL) rectal suppository 650 mg, 650 mg, Rectal, Q4H PRN    heparin (porcine) subcutaneous injection 5,000 Units, 5,000 Units, Subcutaneous, Q8H [address], 5,000 Units at 07/19/18 5988 **AND** Platelet count, , , Once    lacosamide (VIMPAT) 100 mg in sodium chloride 0 9 % 100 mL IVPB, 100 mg, Intravenous, Q12H, Stopped at 18 0031    multi-electrolyte (ISOLYTE-S PH 7 4 equivalent) IV solution, 65 mL/hr, Intravenous, Continuous, 65 mL/hr at 18 0004    ondansetron (ZOFRAN) injection 4 mg, 4 mg, Intravenous, Q6H PRN    ondansetron (ZOFRAN-ODT) dispersible tablet 4 mg, 4 mg, Oral, Once, Stopped at 18 1330    phenytoin (DILANTIN) injection 100 mg, 100 mg, Intravenous, Q8H, 100 mg at 18 6322    Hemodynamic Monitoring  N/A    Physical Exam:  General Appearance: well developed, cooperative and in no apparent distress    Skin: Normal findings:  no rashes, no jaundice    Skin Breakdown and Location: no    H/ENT: Normocephalic, without obvious abnormality, atraumatic ENT exam normal, no neck nodes or sinus tenderness    Eyes: conjunctivae/corneas clear  PERRL, EOM's intact  Fundi benign  Cardiac: regular rate and rhythm, S1, S2 normal, no murmur, click, rub or gallop    Pulmonary: clear to auscultation bilaterally    Gastrointestinal: soft, non-tender; bowel sounds normal; no masses,  no organomegaly    Extremities: normal strength, tone, and muscle mass    Musculoskeletal: negative    Neuro: Patient has fine tremor on left foot    Psych: A&O X 2 patient knew she was at [facility] and knew her name, couldn't answer what year it was  : deferred          Renal Replacement: no  Type:Not on dialysis          Mccloud: no    Lymph: no lymphadenopathy     Lab: I have personally reviewed pertinent lab results  Imaging: I have personally reviewed pertinent reports  EK2018 NSR  VTE Pharmacologic Prophylaxis: Heparin  VTE Mechanical Prophylaxis: sequential compression device  ID: Microbiology E coli in urine culture   Pain: No pain  Code Status: Level 3 - DNAR and DNI     Intake and Outputs:   I/O       701 -  07 -  07    P  O  420 60    I V  (mL/kg)  255 7 (4)    IV Piggyback 500 250    Total Intake(mL/kg) 920 (14 2) 565 7 (8 8)    Urine (mL/kg/hr) 344 (0 2) 1036 (0 7)    Total Output 344 1036    Net +576 -470 3          Unmeasured Urine Occurrence 1 x 2 x        Nutrition:        Diet Orders            Start     Ordered    07/18/18 1343  Diet NPO  Diet effective now     Question Answer Comment   Diet Type NPO    RD to adjust diet per protocol? Yes        07/18/18 1343    07/17/18 1329  Dietary nutrition supplements  Once     Question Answer Comment   Select Supplement: Ensure Compact-Vanilla    Frequency Lunch, Dinner        07/17/18 1329        Labs:     Results from last 7 days  Lab Units 07/19/18  0500 07/15/18  0551 07/14/18  0445   WBC Thousand/uL 6 00 4 30* 6 19   HEMOGLOBIN g/dL 15 2 14 2 13 9   HEMATOCRIT % 48 8* 43 9 44 4   PLATELETS Thousands/uL 175 169 165   NEUTROS PCT % 74 49 64   MONOS PCT % 7 10 10      Results from last 7 days  Lab Units 07/19/18  0500 07/15/18  0551 07/14/18  0445  07/12/18  1906   SODIUM mmol/L 144 142 143  < > 140   POTASSIUM mmol/L 3 8 4 0 3 6  < > 3 6   CHLORIDE mmol/L 108 108 108  < > 103   CO2 mmol/L 29 27 28  < > 29   BUN mg/dL 23 13 15  < > 21   CREATININE mg/dL 0 90 0 83 0 94  < > 1 30   CALCIUM mg/dL 9 5 9 3 9 0  < > 9 6   TOTAL PROTEIN g/dL  --   --   --   --  6 5   BILIRUBIN TOTAL mg/dL  --   --   --   --  0 41   ALK PHOS U/L  --   --   --   --  88   ALT U/L  --   --   --   --  22   AST U/L  --   --   --   --  17   GLUCOSE RANDOM mg/dL 99 101 107  < > 130   < > = values in this interval not displayed      Results from last 7 days  Lab Units 07/19/18  0500   MAGNESIUM mg/dL 2 6       Results from last 7 days  Lab Units 07/19/18  0500   PHOSPHORUS mg/dL 2 9        Results from last 7 days  Lab Units 07/12/18  1936   INR  1 07   PTT seconds 29           0  Lab Value Date/Time   TROPONINI <0 02 07/12/2018 1906   TROPONINI 0 02 02/10/2018 1510   TROPONINI 0 04 02/09/2018 1848   TROPONINI 0 02 11/07/2016 1809   TROPONINI <0 02 06/21/2016 1833   TROPONINI <0 02 06/21/2016 1308   TROPONINI <0 02 06/20/2016 1836 Micro:  Lab Results   Component Value Date    BLOODCX No Growth After 5 Days  11/07/2016    BLOODCX No Growth After 5 Days  11/07/2016    URINECX >100,000 cfu/ml Escherichia coli (A) 07/12/2018    URINECX No Growth <100 cfu/mL 11/09/2016    URINECX 2491-3851 cfu/ml Staphylococcus coagulase negative 11/07/2016       Allergies: No Known Allergies    Medications:   Scheduled Meds:    Current Facility-Administered Medications:  acetaminophen 650 mg Rectal Q4H PRN Elridge Sida, DO    heparin (porcine) 5,000 Units Subcutaneous Q8H [address] Sandy Higginbotham MD    lacosamide (VIMPAT) IVPB 100 mg Intravenous Q12H Jennifer Devarinti, DO Last Rate: Stopped (07/19/18 0031)   multi-electrolyte 65 mL/hr Intravenous Continuous Rosalee Robles MD Last Rate: 65 mL/hr (07/19/18 0004)   ondansetron 4 mg Intravenous Q6H PRN Sandy Higginbotham MD    ondansetron 4 mg Oral Once Anila Alicia PA-C    phenytoin 100 mg Intravenous Q8H Jennifer Devarinti, DO      Continuous Infusions:    multi-electrolyte 65 mL/hr Last Rate: 65 mL/hr (07/19/18 0004)     PRN Meds:    acetaminophen 650 mg Q4H PRN   ondansetron 4 mg Q6H PRN       Invasive lines and devices: Invasive Devices     Peripheral Intravenous Line            Peripheral IV 07/18/18 Right Antecubital less than 1 day                   Counseling / Coordination of Care  Total Critical Care time spent 46 minutes excluding procedures, teaching and family updates  Code Status: Level 3 - DNAR and DNI     Portions of the record may have been created with voice recognition software  Occasional wrong word or "sound a like" substitutions may have occurred due to the inherent limitations of voice recognition software  Read the chart carefully and recognize, using context, where substitutions have occurred       Jennyfer Silveira MD

## 2018-07-19 NOTE — OCCUPATIONAL THERAPY NOTE
Occupational Therapy Cancellation Notice     OT orders received and chart review completed-Attempted to see pt for OTre-evaluation today however pt undergoing EEG monitoring and lethargic at this time  Will continue to follow and attempt to see pt at another time when appropriate         Shanice Wadsworth MS, OTR/L

## 2018-07-19 NOTE — SPEECH THERAPY NOTE
Speech Language/Pathology    Speech/Language Pathology Progress Note    Patient Name: Chantale Chen  YVLAA'A Date: 7/19/2018     Problem List  Patient Active Problem List   Diagnosis    Nephrolithiasis    Hypertension    Hyperbilirubinemia    Hyperlipidemia    Amaurosis fugax    Hearing loss    Subarachnoid hemorrhage (HCC)    Tinea pedis    Vitamin D deficiency    Subdural hematoma (HCC)    Lumbar disc disease    Fall    Bilateral shoulder pain    Peripheral edema    Weakness    Ambulatory dysfunction    Urinary tract infection without hematuria    Acute kidney injury ([address] )    Tremor    Thyroid nodule    Status epilepticus [facility])        Past Medical History  Past Medical History:   Diagnosis Date    Amaurosis fugax 11/18/2016    Arthritis     History of subarachnoid hemorrhage     Hyperlipidemia     Hypertension     Nephrolithiasis     Thyroid nodule 7/17/2018        Past Surgical History  Past Surgical History:   Procedure Laterality Date    LITHOTRIPSY      MI FRAGMENT KIDNEY STONE/ ESWL Left 1/3/2017    Procedure: LITHROTRIPSY EXTRACORPORAL SHOCKWAVE (ESWL); Surgeon: Arian Damon MD;  [facility];  Service: Urology    MI FRAGMENT KIDNEY STONE/ ESWL Right 11/18/2016    Procedure: [place] SHOCKWAVE (ESWL); Surgeon: Arian Damon MD;  [facility];  Service: Urology    URETERAL STENT PLACEMENT Bilateral 11/9/2016    Procedure: Wagoner Deems; Chiquis Matter ;  Surgeon: Arian Damon MD;  [facility];  Service:          Subjective:  Lethargic  (increased from yesterday when seen by me)  Received order for eval  Pt seen yesterday by this SLP on P7  Now transferred to ICU  Was on continuous EEG on P7 as well  Objective:  Open mouth posture  Oral care/moisturizing done  Maintains open mouth posture  trialed small ice chips which kept falling out of her mouth   I closed her lips and she was able to contain it but made no attempt to manipulate it  She did respond to me w/ a whisper x 1 but cannot maintain alertness  No swallow elicited today  Eyes closed  Assessment:  Not appropriate for po  Plan/Recommendations:  Consider keofed

## 2018-07-20 ENCOUNTER — APPOINTMENT (INPATIENT)
Dept: RADIOLOGY | Facility: HOSPITAL | Age: 83
DRG: 682 | End: 2018-07-20
Payer: MEDICARE

## 2018-07-20 ENCOUNTER — APPOINTMENT (INPATIENT)
Dept: NEUROLOGY | Facility: AMBULATORY SURGERY CENTER | Age: 83
DRG: 682 | End: 2018-07-20
Payer: MEDICARE

## 2018-07-20 PROBLEM — G93.40 ENCEPHALOPATHY: Status: ACTIVE | Noted: 2018-07-20

## 2018-07-20 PROBLEM — E87.8 HYPERCHLOREMIA: Status: ACTIVE | Noted: 2018-07-20

## 2018-07-20 PROBLEM — N17.9 ACUTE KIDNEY INJURY (HCC): Status: RESOLVED | Noted: 2018-07-13 | Resolved: 2018-07-20

## 2018-07-20 PROBLEM — N39.0 URINARY TRACT INFECTION WITHOUT HEMATURIA: Status: RESOLVED | Noted: 2018-07-12 | Resolved: 2018-07-20

## 2018-07-20 LAB
ANION GAP SERPL CALCULATED.3IONS-SCNC: 4 MMOL/L (ref 4–13)
ATRIAL RATE: 77 BPM
BASOPHILS # BLD AUTO: 0.02 THOUSANDS/ΜL (ref 0–0.1)
BASOPHILS NFR BLD AUTO: 0 % (ref 0–1)
BUN SERPL-MCNC: 22 MG/DL (ref 5–25)
CALCIUM SERPL-MCNC: 9 MG/DL (ref 8.3–10.1)
CHLORIDE SERPL-SCNC: 111 MMOL/L (ref 100–108)
CO2 SERPL-SCNC: 29 MMOL/L (ref 21–32)
CREAT SERPL-MCNC: 0.9 MG/DL (ref 0.6–1.3)
EOSINOPHIL # BLD AUTO: 0.14 THOUSAND/ΜL (ref 0–0.61)
EOSINOPHIL NFR BLD AUTO: 2 % (ref 0–6)
ERYTHROCYTE [DISTWIDTH] IN BLOOD BY AUTOMATED COUNT: 14.4 % (ref 11.6–15.1)
GFR SERPL CREATININE-BSD FRML MDRD: 57 ML/MIN/1.73SQ M
GLUCOSE SERPL-MCNC: 148 MG/DL (ref 65–140)
HCT VFR BLD AUTO: 47 % (ref 34.8–46.1)
HCT VFR BLD AUTO: 47.2 % (ref 34.8–46.1)
HGB BLD-MCNC: 14.5 G/DL (ref 11.5–15.4)
HGB BLD-MCNC: 14.8 G/DL (ref 11.5–15.4)
IMM GRANULOCYTES # BLD AUTO: 0.02 THOUSAND/UL (ref 0–0.2)
IMM GRANULOCYTES NFR BLD AUTO: 0 % (ref 0–2)
LYMPHOCYTES # BLD AUTO: 0.76 THOUSANDS/ΜL (ref 0.6–4.47)
LYMPHOCYTES NFR BLD AUTO: 12 % (ref 14–44)
MCH RBC QN AUTO: 29.7 PG (ref 26.8–34.3)
MCHC RBC AUTO-ENTMCNC: 30.9 G/DL (ref 31.4–37.4)
MCV RBC AUTO: 96 FL (ref 82–98)
MONOCYTES # BLD AUTO: 0.45 THOUSAND/ΜL (ref 0.17–1.22)
MONOCYTES NFR BLD AUTO: 7 % (ref 4–12)
NEUTROPHILS # BLD AUTO: 5.1 THOUSANDS/ΜL (ref 1.85–7.62)
NEUTS SEG NFR BLD AUTO: 79 % (ref 43–75)
NRBC BLD AUTO-RTO: 0 /100 WBCS
P AXIS: 32 DEGREES
PHENYTOIN SERPL-MCNC: 13.2 UG/ML (ref 10–20)
PLATELET # BLD AUTO: 152 THOUSANDS/UL (ref 149–390)
PMV BLD AUTO: 9.6 FL (ref 8.9–12.7)
POTASSIUM SERPL-SCNC: 4.7 MMOL/L (ref 3.5–5.3)
PR INTERVAL: 158 MS
QRS AXIS: -43 DEGREES
QRSD INTERVAL: 71 MS
QT INTERVAL: 358 MS
QTC INTERVAL: 406 MS
RBC # BLD AUTO: 4.88 MILLION/UL (ref 3.81–5.12)
SODIUM SERPL-SCNC: 144 MMOL/L (ref 136–145)
T WAVE AXIS: 66 DEGREES
VALPROATE SERPL-MCNC: 68 UG/ML (ref 50–100)
VENTRICULAR RATE: 77 BPM
WBC # BLD AUTO: 6.49 THOUSAND/UL (ref 4.31–10.16)

## 2018-07-20 PROCEDURE — 80048 BASIC METABOLIC PNL TOTAL CA: CPT | Performed by: FAMILY MEDICINE

## 2018-07-20 PROCEDURE — 36569 INSJ PICC 5 YR+ W/O IMAGING: CPT

## 2018-07-20 PROCEDURE — 92526 ORAL FUNCTION THERAPY: CPT

## 2018-07-20 PROCEDURE — 02HV33Z INSERTION OF INFUSION DEVICE INTO SUPERIOR VENA CAVA, PERCUTANEOUS APPROACH: ICD-10-PCS | Performed by: INTERNAL MEDICINE

## 2018-07-20 PROCEDURE — 74018 RADEX ABDOMEN 1 VIEW: CPT

## 2018-07-20 PROCEDURE — 80185 ASSAY OF PHENYTOIN TOTAL: CPT | Performed by: PHYSICIAN ASSISTANT

## 2018-07-20 PROCEDURE — 85014 HEMATOCRIT: CPT | Performed by: STUDENT IN AN ORGANIZED HEALTH CARE EDUCATION/TRAINING PROGRAM

## 2018-07-20 PROCEDURE — 85025 COMPLETE CBC W/AUTO DIFF WBC: CPT | Performed by: FAMILY MEDICINE

## 2018-07-20 PROCEDURE — 85018 HEMOGLOBIN: CPT | Performed by: STUDENT IN AN ORGANIZED HEALTH CARE EDUCATION/TRAINING PROGRAM

## 2018-07-20 PROCEDURE — 99233 SBSQ HOSP IP/OBS HIGH 50: CPT | Performed by: PSYCHIATRY & NEUROLOGY

## 2018-07-20 PROCEDURE — C1751 CATH, INF, PER/CENT/MIDLINE: HCPCS

## 2018-07-20 PROCEDURE — 93010 ELECTROCARDIOGRAM REPORT: CPT | Performed by: INTERNAL MEDICINE

## 2018-07-20 PROCEDURE — 80164 ASSAY DIPROPYLACETIC ACD TOT: CPT | Performed by: PHYSICIAN ASSISTANT

## 2018-07-20 PROCEDURE — 95951 PR EEG MONITORING/VIDEORECORD: CPT | Performed by: PSYCHIATRY & NEUROLOGY

## 2018-07-20 PROCEDURE — 95951 HB EEG MONITORING/VIDEORECORD: CPT

## 2018-07-20 RX ORDER — VALPROIC ACID 250 MG/5ML
500 SOLUTION ORAL EVERY 8 HOURS SCHEDULED
Status: DISCONTINUED | OUTPATIENT
Start: 2018-07-20 | End: 2018-07-21

## 2018-07-20 RX ORDER — QUETIAPINE FUMARATE 25 MG/1
25 TABLET, FILM COATED ORAL
Status: DISCONTINUED | OUTPATIENT
Start: 2018-07-20 | End: 2018-07-24

## 2018-07-20 RX ORDER — ACETAMINOPHEN 160 MG/5ML
650 SUSPENSION, ORAL (FINAL DOSE FORM) ORAL EVERY 6 HOURS PRN
Status: DISCONTINUED | OUTPATIENT
Start: 2018-07-20 | End: 2018-07-29 | Stop reason: HOSPADM

## 2018-07-20 RX ORDER — SENNOSIDES 8.8 MG/5ML
8.8 LIQUID ORAL
Status: DISCONTINUED | OUTPATIENT
Start: 2018-07-20 | End: 2018-07-29 | Stop reason: HOSPADM

## 2018-07-20 RX ADMIN — BISACODYL 10 MG: 10 SUPPOSITORY RECTAL at 09:27

## 2018-07-20 RX ADMIN — PHENYTOIN SODIUM 100 MG: 50 INJECTION INTRAMUSCULAR; INTRAVENOUS at 13:44

## 2018-07-20 RX ADMIN — HEPARIN SODIUM 5000 UNITS: 5000 INJECTION, SOLUTION INTRAVENOUS; SUBCUTANEOUS at 23:34

## 2018-07-20 RX ADMIN — HEPARIN SODIUM 5000 UNITS: 5000 INJECTION, SOLUTION INTRAVENOUS; SUBCUTANEOUS at 06:22

## 2018-07-20 RX ADMIN — DIVALPROEX SODIUM 500 MG: 125 CAPSULE, COATED PELLETS ORAL at 14:03

## 2018-07-20 RX ADMIN — SODIUM CHLORIDE 150 MG: 9 INJECTION, SOLUTION INTRAVENOUS at 23:02

## 2018-07-20 RX ADMIN — HEPARIN SODIUM 5000 UNITS: 5000 INJECTION, SOLUTION INTRAVENOUS; SUBCUTANEOUS at 13:44

## 2018-07-20 RX ADMIN — PHENYTOIN SODIUM 100 MG: 50 INJECTION INTRAMUSCULAR; INTRAVENOUS at 06:16

## 2018-07-20 RX ADMIN — VALPROATE SODIUM 500 MG: 100 INJECTION, SOLUTION INTRAVENOUS at 02:44

## 2018-07-20 RX ADMIN — ACETAMINOPHEN 650 MG: 650 SUPPOSITORY RECTAL at 09:18

## 2018-07-20 RX ADMIN — Medication 100 MG: at 10:51

## 2018-07-20 RX ADMIN — PHENYTOIN SODIUM 100 MG: 50 INJECTION INTRAMUSCULAR; INTRAVENOUS at 23:20

## 2018-07-20 RX ADMIN — VALPROATE SODIUM 500 MG: 100 INJECTION, SOLUTION INTRAVENOUS at 09:15

## 2018-07-20 NOTE — PROCEDURES
Insert PICC line  Date/Time: 7/20/2018 11:43 AM  Performed by: Jimmy Gaitan by: Vidhya Gunn     Patient location:  Bedside  Other Assisting Provider: Yes (comment)    Consent:     Consent obtained:  Verbal and written (physician obtained consnet from pt's son)    Consent given by: consent from pt's son Issac Tillman protocol:     Procedure explained and questions answered to patient or proxy's satisfaction: yes      Relevant documents present and verified: yes      Test results available and properly labeled: yes      Radiology Images displayed and confirmed  If images not available, report reviewed: yes      Required blood products, implants, devices, and special equipment available: yes      Site/side marked: yes      Immediately prior to procedure, a time out was called: yes      Patient identity confirmed:  Arm band and hospital-assigned identification number  Pre-procedure details:     Hand hygiene: Hand hygiene performed prior to insertion      Skin preparation:  ChloraPrep    Skin preparation agent: Skin preparation agent completely dried prior to procedure    Indications:     PICC line indications: no peripheral vascular access    Anesthesia (see MAR for exact dosages): Anesthesia method:  Local infiltration    Local anesthetic:  Lidocaine 1% w/o epi (3 ml)  Procedure details:     Location:  Basilic    Vessel type: vein      Laterality:  Right    Approach: percutaneous technique used      Patient position: 30 degree elevation      Procedural supplies:  Triple lumen    Catheter size:  5 Fr    Landmarks identified: yes      Ultrasound guidance: yes      Sterile ultrasound techniques: Sterile gel and sterile probe covers were used      Number of attempts:  1    Successful placement: yes      Vessel of catheter tip end:  Sherlock 3CG confirmed (ok to use, reported to RN)    Total catheter length (cm):  37    Catheter out on skin (cm):  0    Max flow rate:  999 ml / hr    Arm circumference:  30  Post-procedure details:     Post-procedure:  Dressing applied and securement device placed    Assessment:  Blood return through all ports and free fluid flow    Post-procedure complications: none      Patient tolerance of procedure:   Tolerated well, no immediate complications    Observer: Yes      Observer name:  Hernando Saldaña, Infusion tech

## 2018-07-20 NOTE — PROGRESS NOTES
Progress Note - Neurology   Karel Del Valle 80 y o  female [id number]  Unit/Bed#: ICU 01/ICU 01    Assessment:  Darrin Koyanagi a 80 y  o  left handed female with history of amaurosis fugax in 11/2016, ICH in 6/2016, traumatic SAH in 2/2018 after falling when shoveling snow, hypertension, hyperlipidemia,  who present to the hospital on 07/12/2018 secondary to complaints of generalized weakness with inability to get out of her chair which started the day prior  Patient was found to have UTI and is currently being treated for this  Patient developed left upper extremity shaking which resolved, followed by a left lower extremity shaking 2 days later  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  Video EEG monitoring has continued as AEDs have been adjusted  Unfortunately, though much improved, left lower extremity jerking still present  Plan:  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  Persistent subtle left foot jerking, though amplitude has continued to decrease  Patient's son, YASMIN, was present at bedside  I spoke with him at length with regards to the patient's progress, her current difficulty with swallowing and need for an NGT, and overall prognosis  At this point, we believe that the patient's difficulty with swallowing is most likely related to her somnolence which is secondary to her AEDs  As we wean off medications and she adjusts to current dosing of others, we would expect that this would improved and that ultimately a NGT would no longer be needed  With regards to her seizure, her clinical symptoms of left foot/leg twitching are improving  Additionally she is more alert than yesterday  We will continue to monitor closely  Continue video EEG monitoring  vEEG revealed continuous periodic epileptiform discharges with focal slowing over the right parasagittal region to posterior quadrant associated with focal left foot jerking    CT head negative for acute abnormality  Repeat stat CTH negative for acute abnormality  MRI brain with and without contrast pending  Will likely have patient undergo MRI on Monday depending on clinical course over the weekend  Current AED regimen:   - Lacosamide 100mg q 12 hours - will likely discontinue tomorrow   - Phenytoin 100mg q 8 hours - consider changing to PO via NGT over the weekend   - Depakote 500mg q 8hrs - 7/20 Valproic acid level 68  Will obtain the following:   - Phenytoin level re-timed to 7/20 at 1330   - Depacon level 7/21 prior to AM dose   - Phenytoin and Depacon level on 7/23 prior to AM dose  Tele  Seizure precautions    Encephalopathy  Likely multifactorial including: secondary to AEDs, ongoing seizure, delirium   Increased agitation overnight  Seroquel 25mg qhs  Promote sleep/wake cycle  Frequent neurological checks  Stat CT head with acute decline of in exam/mental status  Notify neurology with any changes in examination  R 3rd and 4th digit distal pallor  Resolved    Ecoli UTI  Treatment completed    Generalized weakness and ambulatory dysfunction  OT/PT  Recommending STR    Subjective:   Simon Campos is a 80 y o  left handed female with history of amaurosis fugax in 11/2016, hypertension, hyperlipidemia, traumatic SAH in 2/2018 after falling when shoveling snow who presented to the hospital on 07/12/2018 secondary to complaints of generalized weakness with inability to get out of her chair   CT head was negative for acute abnormality   A small focus of encephalomalacia at the left frontoparietal junction, the sequelae of prior hemorrhage, was noted  UA noted to be positive  Patient also with c/o LLQ discomfort  CT A/P revealed diverticula and nonobstructive nephrolithiasis  Antibiotics were started  Patient developed left upper extremity shaking which resolved, followed by a left lower extremity shaking 2 days later    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  Keppra was initiated   Without significant improvement  Fosphenytoin was added and after infusion the patient was noted to become hypotensive with subsequent hypertension and bradycardia, as well as increased somnolence  Stat CT head was negative for acute abnormality  Due to the symptoms and the persistent nature of her depressed mentation, the patient was transferred to the ICU  Keppra was subsequently discontinued as it did not seem to have a significant effect on her seizure activity  Current AED regimen is with Lacosamide, Phenytoin and Depakote  Today on exam, the patient was sleeping on arrival   She is oriented to person, month and year  She was able to pick out that we were in a hospital when given 3 choices  She denies pain  Radha Lapidus currently in place  The patient's son, Jc Vazquez, was at bedside  I did talk with him at length with regards to the patient's progress, persistent seizure and AED regimen  Past Medical History:   Diagnosis Date    Amaurosis fugax 11/18/2016    Arthritis     History of subarachnoid hemorrhage     Hyperlipidemia     Hypertension     Nephrolithiasis     Thyroid nodule 7/17/2018     Past Surgical History:   Procedure Laterality Date    LITHOTRIPSY      HI FRAGMENT KIDNEY STONE/ ESWL Left 1/3/2017    Procedure: LITHROTRIPSY EXTRACORPORAL SHOCKWAVE (ESWL); Surgeon: Nati Isbell MD;  [facility];  Service: Urology    HI FRAGMENT KIDNEY STONE/ ESWL Right 11/18/2016    Procedure: Duluis daniele Everts SHOCKWAVE (ESWL);   Surgeon: Nati Isbell MD;  [facility];  Service: Urology    URETERAL STENT PLACEMENT Bilateral 11/9/2016    Procedure: Clio Taylor; Gloria Prudent ;  Surgeon: Nati Isbell MD;  [facility];  Service:      Family history:  Family History   Problem Relation Age of Onset    Pneumonia Mother     Cancer Sister     Prostate cancer Brother        Social History     Social History    Marital status:      Spouse name: N/A    Number of children: N/A    Years of education: N/A     Social History Main Topics    Smoking status: Never Smoker    Smokeless tobacco: Never Used    Alcohol use No    Drug use: No    Sexual activity: Not Asked     Other Topics Concern    None     Social History Narrative    None       Scheduled Meds:    Current Facility-Administered Medications:  acetaminophen 650 mg Per NG Tube Q6H PRN Rachael Montes PA-C    bisacodyl 10 mg Rectal Daily Ajay Ortega DO    divalproex sodium 500 mg Oral [facility] Duran Canales PA-C    heparin (porcine) 5,000 Units Subcutaneous Q8H [facility] Sandy Higginbotham MD    lacosamide (VIMPAT) IVPB 100 mg Intravenous Q12H Jenniferjaney Escalante DO Last Rate: Stopped (07/20/18 1057)   melatonin 3 mg Oral HS Juan Jose Zurita MD    ondansetron 4 mg Intravenous Q6H PRN Sandy Aguirre MD    ondansetron 4 mg Oral Once Anila Alicia PA-C    phenytoin 100 mg Intravenous Q8H Jennifer Escalante DO    QUEtiapine 25 mg Oral HS Rachael Montes PA-C    senna 8 8 mg Per NG Tube HS Rachael Montes PA-C      Continuous Infusions:   PRN Meds:   acetaminophen    ondansetron    ROS: Unable to be obtained due to AMS  Vitals: /54   Pulse 78   Temp 98 °F (36 7 °C) (Oral)   Resp (!) 38   Ht 5' 2" (1 575 m)   Wt 64 6 kg (142 lb 6 7 oz)   SpO2 98%   BMI 26 05 kg/m²     Physical Exam:   Physical Exam   Constitutional: She appears well-developed and well-nourished  No distress  Elderly female supine in bed   HENT:   Head: Normocephalic and atraumatic  Eyes: Conjunctivae and EOM are normal  Pupils are equal, round, and reactive to light  Right eye exhibits no discharge  Left eye exhibits no discharge  No scleral icterus  Able to track examiner   Neck: Normal range of motion  Neck supple  Cardiovascular: Normal rate and regular rhythm  Exam reveals no gallop and no friction rub  No murmur heard    Pulmonary/Chest: Effort normal and breath sounds normal  No stridor  No respiratory distress  She has no wheezes  She has no rales  She exhibits no tenderness  Musculoskeletal: She exhibits no edema, tenderness or deformity  Lymphadenopathy:     She has no cervical adenopathy  Neurological: She is alert  Skin: Skin is warm and dry  No rash noted  No erythema  Neurologic Exam     Mental Status   Drowsy  Patient awoke to her name  She is oriented to person, month and year  She was able to pick out that we were in a hospital when given 3 choices  Continues to know the name of her son however when asked the name of the grandson that she lives with she continues to state the incorrect grandson  She also was noted to perseverate again today  She was able to follow one-step commands with fair accuracy  No evidence of aphasia or dysarthria     Cranial Nerves     CN II   Visual acuity: normal (Grossly)  Left visual field deficit: upper temporal quadrant(s)    CN III, IV, VI   Pupils are equal, round, and reactive to light  Extraocular motions are normal    Conjugate gaze: present    CN VII   Facial expression full, symmetric  Patient able to track examiner  Patient does seem to have decreased L superior temporal vision during today's evaluation  Motor Exam   Muscle bulk: decreased  Decreased ability to follow commands and therefore limited motor evaluation  RUE:  Full  strength    Remainder grossly 3-/5    LUE:  Patient able to lift arm antigravity x5 seconds to approximately 70° of shoulder flexion    RLE: proximal 2, able to wiggle toes and dorsiflex on command    LLE: proximal 1+, trace toe wiggling, persistent low amplitude L foot rhythmic movement       Labs:  Recent Results (from the past 24 hour(s))   Hemoglobin and hematocrit, blood    Collection Time: 07/20/18  1:32 AM   Result Value Ref Range    Hemoglobin 14 8 11 5 - 15 4 g/dL    Hematocrit 47 2 (H) 34 8 - 46 1 %   CBC and differential    Collection Time: 07/20/18  4:57 AM   Result Value Ref Range    WBC 6 49 4 31 - 10 16 Thousand/uL    RBC 4 88 3 81 - 5 12 Million/uL    Hemoglobin 14 5 11 5 - 15 4 g/dL    Hematocrit 47 0 (H) 34 8 - 46 1 %    MCV 96 82 - 98 fL    MCH 29 7 26 8 - 34 3 pg    MCHC 30 9 (L) 31 4 - 37 4 g/dL    RDW 14 4 11 6 - 15 1 %    MPV 9 6 8 9 - 12 7 fL    Platelets 349 207 - 532 Thousands/uL    nRBC 0 /100 WBCs    Neutrophils Relative 79 (H) 43 - 75 %    Immat GRANS % 0 0 - 2 %    Lymphocytes Relative 12 (L) 14 - 44 %    Monocytes Relative 7 4 - 12 %    Eosinophils Relative 2 0 - 6 %    Basophils Relative 0 0 - 1 %    Neutrophils Absolute 5 10 1 85 - 7 62 Thousands/µL    Immature Grans Absolute 0 02 0 00 - 0 20 Thousand/uL    Lymphocytes Absolute 0 76 0 60 - 4 47 Thousands/µL    Monocytes Absolute 0 45 0 17 - 1 22 Thousand/µL    Eosinophils Absolute 0 14 0 00 - 0 61 Thousand/µL    Basophils Absolute 0 02 0 00 - 0 10 Thousands/µL   Basic metabolic panel    Collection Time: 07/20/18  4:57 AM   Result Value Ref Range    Sodium 144 136 - 145 mmol/L    Potassium 4 7 3 5 - 5 3 mmol/L    Chloride 111 (H) 100 - 108 mmol/L    CO2 29 21 - 32 mmol/L    Anion Gap 4 4 - 13 mmol/L    BUN 22 5 - 25 mg/dL    Creatinine 0 90 0 60 - 1 30 mg/dL    Glucose 148 (H) 65 - 140 mg/dL    Calcium 9 0 8 3 - 10 1 mg/dL    eGFR 57 ml/min/1 73sq m   Valproic acid level, total    Collection Time: 07/20/18  4:57 AM   Result Value Ref Range    Valproic Acid, Total 68 50 - 100 ug/mL     Imaging: I have personally reviewed pertinent imaging and PACS reports       VTE Prophylaxis: Heparin

## 2018-07-20 NOTE — NUTRITION
07/20/18 1012   Recommendations/Interventions   Nutrition Recommendations Adjust EN/PN  (Rec increasing tube feeding as tolerated with Jevity 1 2 to goal rate of 58ml/hr to provide 1392ml, 1670kcal, 77g pro, 1128ml free water   Monitor weight and electrolytes  )

## 2018-07-20 NOTE — PROGRESS NOTES
Progress Note - ICU Transfer to Step down/med  surg  Taco Ra 80 y o  female MRN: [id number]    Unit/Bed#: ICU 01 Encounter: [id number]      Code Status: Level 3 - DNAR and DNI    Date of ICU admission: 6/95/0801    PCP: Dr Nirav Arellano     Reason for ICU adm: encephalopathy/status epilepticus    Active problems:   Patient Active Problem List   Diagnosis    Nephrolithiasis    Hypertension    Hyperbilirubinemia    Hyperlipidemia    Amaurosis fugax    Hearing loss    Subarachnoid hemorrhage ([address] )    Tinea pedis    Vitamin D deficiency    Subdural hematoma (HCC)    Lumbar disc disease    Fall    Bilateral shoulder pain    Peripheral edema    Weakness    Ambulatory dysfunction    Tremor    Thyroid nodule    Status epilepticus ([address] )    Hyperchloremia    Encephalopathy with underlying dementia       Summary of clinical course:   Admitted to [address] [place] [address] 7/12 from home 2/2 generalized weakness and inability to get out of her chair which started the day prior  She was found to have an E  Coli UTI, mild REINIER and a persistent LLE tremor on exam  Her UTI was tx'd w/ 7 day course of ABX  Neurology was consulted and she was placed on vEEG  It was found that she is having focal motor status epilepticus  No prior h/o seizures  Received keppra, vimpat, carbamazepine and was initiated on fosphenytoin bolus of 1175mg IV on 7/17       S/p fosphenytoin bolus, she became transiently hypotensive in the 'V systolic and bradycardia which has since resolved  She became significantly obtunded s/p phosphytoin administration and Critical care was requested to evaluate the patient  Patient was transferred to the ICU for closer monitoring of the patient with the antiepileptic medication titration  Her encephalopathy has vastly improved however she remains having focal seizures with neurology/epileptology continuing to titrate her medications       On a critical standpoint, she is stable to get transferred to the floor and Continue her vEEG and management of her AEDs  I have spoken to Dr Malissa Bennett of [facility] which accepted transfer of the patient to [facility]    Recent or scheduled procedures:   CT Spine (7/16) No cervical spine fracture or traumatic malalignment  Moderate to marked multilevel spondylotic degenerative changes of the cervical spine as described above with severe bilateral neural foraminal narrowing at C4/C5 and C5/C6  No significant spinal canal stenosis  Stable 2 3 cm left thyroid lobe nodule   Again, a nonemergent thyroid ultrasound is recommended for further characterization  Considerations related to the patient's age and/or comorbidities may be used to alter these recommendations     CT Thoracic (7/16) Mild multilevel spondylotic degenerative changes of the thoracic spine causing no significant spinal canal stenosis or neural foraminal narrowing  No fracture or subluxation  MRI Lumbar (7/16) Mild smooth levoscoliosis is grossly unchanged   Slight progression of lumbar degenerative disc disease with slight worsening of canal stenosis and foraminal narrowing described in detail above  Fusion of the endplates at U5-B9 with mild foraminal narrowing on the left  7/16 c Started on EMU  CT Head (7/12) No acute intracranial abnormality  Mild chronic small vessel ischemic changes and volume loss  Small focus of encephalomalacia at the left frontoparietal junction, the sequela of prior hemorrhage   No new hemorrhage  CT Abdomen (7/12) 1   Diverticulosis without evidence of diverticulitis or colitis   No bowel obstruction  2   Cholelithiasis   No evidence of cholecystitis or biliary obstruction  3   Numerous nonobstructing right renal calculi measuring between 5 and 14 mm   Nonobstructing left renal calculi measuring between 2 and 5 mm  CT Head (7/17): (-) acute    Outstanding/pending diagnostics:   [  ] AED levels (managed by neurology)  [  ] vEEG monitoring    Hospital discharge planning:     To be determined based on patient's clinical course

## 2018-07-20 NOTE — CASE MANAGEMENT
Continued Stay Review    Date: 7/20/18    Vital Signs: /53 (BP Location: Left arm)   Pulse 88   Temp 98 °F (36 7 °C) (Oral)   Resp 20   Ht 5' 2" (1 575 m)   Wt 64 6 kg (142 lb 6 7 oz)   SpO2 100%   BMI 26 05 kg/m²     Medications:   Scheduled Meds:   Current Facility-Administered Medications:  acetaminophen 650 mg Rectal Q4H PRN Viviane DO Petar    bisacodyl 10 mg Rectal Daily Tristan Jacobo DO    divalproex sodium 500 mg Oral [facility] Virginia Hardin PA-C    heparin (porcine) 5,000 Units Subcutaneous Q8H [facility] Sandy Higginbotham MD    lacosamide (VIMPAT) IVPB 100 mg Intravenous Q12H Jennifer Escalante DO Last Rate: Stopped (07/19/18 2228)   melatonin 3 mg Oral HS Sunita Delgado MD    OLANZapine 2 5 mg Oral HS Sunita Delgado MD    ondansetron 4 mg Intravenous Q6H PRN Sandy Higginbotham MD    ondansetron 4 mg Oral Once Clarice Schuster PA-C    phenytoin 100 mg Intravenous Q8H Jennifer Escalante DO    valproate sodium (DEPACON) IV bolus 500 mg Intravenous Q6H Virginia Hardin PA-C Last Rate: 500 mg (07/20/18 0915)     Continuous Infusions:    PRN Meds:   acetaminophen    ondansetron    Abnormal Labs/Diagnostic Results:   Results from last 7 days  Lab Units 07/20/18  0457 07/20/18  0132 07/19/18  0500 07/15/18  0551   WBC Thousand/uL 6 49  --  6 00 4 30*   HEMOGLOBIN g/dL 14 5 14 8 15 2 14 2   HEMATOCRIT % 47 0* 47 2* 48 8* 43 9   PLATELETS Thousands/uL 152  --  175 169   NEUTROS PCT % 79*  --  74 49   MONOS PCT % 7  --  7 10        Results from last 7 days  Lab Units 07/20/18  0457 07/19/18  0500 07/15/18  0551   SODIUM mmol/L 144 144 142   POTASSIUM mmol/L 4 7 3 8 4 0   CHLORIDE mmol/L 111* 108 108   CO2 mmol/L 29 29 27   BUN mg/dL 22 23 13   CREATININE mg/dL 0 90 0 90 0 83   CALCIUM mg/dL 9 0 9 5 9 3   GLUCOSE RANDOM mg/dL 148* 99 101         Results from last 7 days  Lab Units 07/19/18  0500   MAGNESIUM mg/dL 2 6            Lab Results   Component Value Date     PHOS 2 9 07/19/2018     PHOS 3 7 06/23/2016     PHOS 4 0 06/21/2016             0  Lab Value Date/Time   TROPONINI <0 02 07/12/2018 1906   TROPONINI 0 02 02/10/2018 1510   TROPONINI 0 04 02/09/2018 1848   TROPONINI 0 02 11/07/2016 1809   TROPONINI <0 02 06/21/2016 1833   TROPONINI <0 02 06/21/2016 1308   TROPONINI <0 02 06/20/2016 1836          ABG:        Lab Results   Component Value Date     PHART 7 397 07/18/2018     FKB1PRJ 44 7 (H) 07/18/2018     PO2ART 110 7 07/18/2018     [id number] 26 9 07/18/2018     BEART 1 6 07/18/2018     SOURCE Radial, Right 07/18/2018      Imaging: CXR:Nasoenteric tube tip has been advanced with tip projecting over the gastric antrum  I have personally reviewed pertinent reports  EKG: NSR  Micro:        Lab Results   Component Value Date     BLOODCX No Growth After 5 Days  11/07/2016     BLOODCX No Growth After 5 Days   11/07/2016     URINECX >100,000 cfu/ml Escherichia coli (A) 07/12/2018     URINECX No Growth <100 cfu/mL 11/09/2016     URINECX 7649-0772 cfu/ml Staphylococcus coagulase negative 11/07/2016        Age/Sex: 80 y o  female     Assessment/Plan: Assessment and Plan:   Principal Problem:    Status epilepticus [address] [address] )  Active Problems:    Hypertension    Weakness    Ambulatory dysfunction    Tremor    Thyroid nodule    Hyperchloremia    Encephalopathy with underlying dementia  Resolved Problems:    Urinary tract infection without hematuria    Acute kidney injury ([facility] [address] )    Localization-related (focal) (partial) idiopathic epilepsy and epileptic syndromes with seizures of localized onset, not intractable, with status epilepticus ([facility] [address] )           Neuro:   Epilepsia partialis continua              Medications: vimpat 100mg q12, Dilantin 100mg q8              Continue vEEG/neuro checks              Neurology following  Encephalopathy w/ Underlying dementia              Improving              Suspected to be multifactorial: delerium 2/2 critical illness, seizure, pharmacologic agents              Started on melatonin and zyprexa ON  Reviewed outpatient medlist: no antipsychotics or alzheimers medications present              delerium precautions              Promote sleep/wake cycle        CV:   Bradycardia & hypotension-resolved  Isolated event s/p phosphenytoin infusion  Continue telemetry  Most recent Echo: 6/2016: EF 65mmhg, [id number]  Trace AR  Access: Peripheral  Nurses report patient has difficult vascular access  Consider PICC       Pulm:   Stable  Titrate O2 to maintain SpO2 >92%     GI:  Enteral access achieved yesterday  Started tube feeds  Obtain nutrition consult for tube feed recommendations     :   Incontinent  Trend BUN/Creatinine     F/E/N:   Replete lytes prn  D/c fluids     ID: Copmpleted 7 day course of abx for e  Coli UTI 7/18 keflex/ceftriaxone     Heme: stable     Endo: stable                Msk/Skin: frequent repositioning  Assess daily for skin breakdown     Disposition: ICU care   D/w neurology results of vEEG       Discharge Plan: to be determined

## 2018-07-20 NOTE — PROGRESS NOTES
Progress Note - Critical Care   Mukesh Cotton 80 y o  female MRN: [id number]  Unit/Bed#: ICU 01 Encounter: [id number]    Attending Physician: Mayank Romero MD      ______________________________________________________________________  Assessment and Plan:   Principal Problem:    Status epilepticus [facility])  Active Problems:    Hypertension    Weakness    Ambulatory dysfunction    Tremor    Thyroid nodule    Hyperchloremia    Encephalopathy with underlying dementia  Resolved Problems:    Urinary tract infection without hematuria    Acute kidney injury ([facility] [address] )    Localization-related (focal) (partial) idiopathic epilepsy and epileptic syndromes with seizures of localized onset, not intractable, with status epileptic[facility] ([facility] )        Neuro:   Epilepsia partialis continua   Medications: vimpat 100mg q12, Dilantin 100mg q8   Continue vEEG/neuro checks   Neurology following  Encephalopathy w/ Underlying dementia   Improving   Suspected to be multifactorial: delerium 2/2 critical illness, seizure, pharmacologic agents   Started on melatonin and zyprexa ON  Reviewed outpatient medlist: no antipsychotics or alzheimers medications present   delerium precautions   Promote sleep/wake cycle      CV:   Bradycardia & hypotension-resolved  Isolated event s/p phosphenytoin infusion  Continue telemetry  Most recent Echo: 6/2016: EF 65mmhg, [id number]  Trace AR  Access: Peripheral  Nurses report patient has difficult vascular access  Consider PICC  Pulm:   Stable  Titrate O2 to maintain SpO2 >92%    GI:  Enteral access achieved yesterday  Started tube feeds  Obtain nutrition consult for tube feed recommendations    :   Incontinent  Trend BUN/Creatinine    F/E/N:   Replete lytes prn  D/c fluids    ID: Copmpleted 7 day course of abx for e  Coli UTI 7/18 keflex/ceftriaxone    Heme: stable    Endo: stable     Msk/Skin: frequent repositioning  Assess daily for skin breakdown    Disposition: ICU care   D/w neurology results of vEEG    Code Status: Level 3 - DNAR and DNI    Counseling / Coordination of Care  Total Critical Care time spent 0 minutes excluding procedures, teaching and family updates  ______________________________________________________________________    Chief Complaint: "My name is Awa Garcia"    24 Hour Events:   · Reportedly less amplitude of jerking movements on yesterday vEEG  · Valproate (depacon) 500mg q6 IV started yesterday @ 1500  · Depakote 500mg q8 oral is ordered to start today @ 1400  · Keofed inserted yesterday    Current Seizure Meds:   · depakote sprinkles 500mg q8 (1400)  · Vimpat 100 q12 IV  · Dilantin 100mg q8 IV  · Phenytoin (dilantin)100mg q8    I/O:   1 7/468 +1 2    Review of Systems   Unable to perform ROS: Dementia     ______________________________________________________________________    Physical Exam:   Physical Exam   HENT:   Head: Normocephalic  Mouth/Throat: Oropharynx is clear and moist  She has dentures  keofed in place   Eyes: Pupils are equal, round, and reactive to light  Neck: Trachea normal and normal range of motion  No JVD present  Cardiovascular: Normal rate, regular rhythm, intact distal pulses and normal pulses  Pulmonary/Chest: Effort normal and breath sounds normal    Abdominal: Soft  Normal appearance  There is no tenderness  Neurological: She is alert  No cranial nerve deficit  GCS eye subscore is 3  GCS verbal subscore is 3  GCS motor subscore is 5  Persistent left foot twitch   Skin: Skin is dry and intact  Capillary refill takes less than 2 seconds  Psychiatric: She is agitated  Nursing note and vitals reviewed        ______________________________________________________________________  Vitals:    07/20/18 0400 07/20/18 0528 07/20/18 0600 07/20/18 0700   BP:  123/58 (!) 101/39 (!) 113/41   Pulse: 94 82 70 70   Resp: 22 (!) 50 13 (!) 11   Temp: 97 6 °F (36 4 °C)      TempSrc: Oral      SpO2: 98% 99% 100% 100%   Weight:       Height: Temperature:   Temp (24hrs), Av 6 °F (37 °C), Min:97 6 °F (36 4 °C), Max:99 2 °F (37 3 °C)    Current Temperature: 97 6 °F (36 4 °C)  Weights:   IBW: 50 1 kg    Body mass index is 26 05 kg/m²  Weight (last 2 days)     Date/Time   Weight    18 0557  64 6 (142 42)            Hemodynamic Monitoring:  N/A     Non-Invasive/Invasive Ventilation Settings:  Respiratory    Lab Data (Last 4 hours)    None         O2/Vent Data (Last 4 hours)    None              No results found for: PHART, HAH5KPW, PO2ART, ROP3RJT, F2VRFWZT, BEART, SOURCE  SpO2: SpO2: 100 %  Intake and Outputs:  I/O       701 -  07 07 0700    P  O  60 0     I V  (mL/kg) 385 7 (6) 686 5 (10 6)     NG/GT  558     IV Piggyback 250 500     Total Intake(mL/kg) 695 7 (10 8) 1744 5 (27)     Urine (mL/kg/hr) 1036 (0 7) 468 (0 3)     Total Output 1036 468      Net -340 3 +1276 5             Unmeasured Urine Occurrence 4 x 4 x     Unmeasured Stool Occurrence  2 x           Nutrition:        Diet Orders            Start     Ordered    18 1233  Diet Enteral/Parenteral; Tube Feeding No Oral Diet; Jevity 1 2 Betito; 20  Diet effective now     Comments:  Increase tube feeding to a goal rate of 50 mL/hr  Thank you  Question Answer Comment   Diet Type Enteral/Parenteral    Enteral/Parenteral Tube Feeding No Oral Diet    Tube Feeding Formula: Jevity 1 2 Betito    Tube Feeding Continuous rate (mL/hr): 20    RD to adjust diet per protocol?  Yes        18 1235    18 1329  Dietary nutrition supplements  Once     Question Answer Comment   Select Supplement: Ensure Compact-Vanilla    Frequency Lunch, Dinner        18 1329        TF currently running at 50 ml/hr with a goal of 50 Formula: jevity 1 2  Labs:     Results from last 7 days  Lab Units 18  0457 18  0132 18  0500 07/15/18  0551   WBC Thousand/uL 6 49  --  6 00 4 30*   HEMOGLOBIN g/dL 14 5 14 8 15 2 14 2   HEMATOCRIT % 47 0* 47 2* 48 8* 43 9   PLATELETS Thousands/uL 152  --  175 169   NEUTROS PCT % 79*  --  74 49   MONOS PCT % 7  --  7 10       Results from last 7 days  Lab Units 07/20/18  0457 07/19/18  0500 07/15/18  0551   SODIUM mmol/L 144 144 142   POTASSIUM mmol/L 4 7 3 8 4 0   CHLORIDE mmol/L 111* 108 108   CO2 mmol/L 29 29 27   BUN mg/dL 22 23 13   CREATININE mg/dL 0 90 0 90 0 83   CALCIUM mg/dL 9 0 9 5 9 3   GLUCOSE RANDOM mg/dL 148* 99 101       Results from last 7 days  Lab Units 07/19/18  0500   MAGNESIUM mg/dL 2 6     Lab Results   Component Value Date    PHOS 2 9 07/19/2018    PHOS 3 7 06/23/2016    PHOS 4 0 06/21/2016            0  Lab Value Date/Time   TROPONINI <0 02 07/12/2018 1906   TROPONINI 0 02 02/10/2018 1510   TROPONINI 0 04 02/09/2018 1848   TROPONINI 0 02 11/07/2016 1809   TROPONINI <0 02 06/21/2016 1833   TROPONINI <0 02 06/21/2016 1308   TROPONINI <0 02 06/20/2016 1836         ABG:  Lab Results   Component Value Date    PHART 7 397 07/18/2018    WDX4BBN 44 7 (H) 07/18/2018    PO2ART 110 7 07/18/2018    OBQ7UFR 26 9 07/18/2018    BEART 1 6 07/18/2018    SOURCE Radial, Right 07/18/2018     Imaging: CXR:Nasoenteric tube tip has been advanced with tip projecting over the gastric antrum  I have personally reviewed pertinent reports  EKG: NSR  Micro:  Lab Results   Component Value Date    BLOODCX No Growth After 5 Days  11/07/2016    BLOODCX No Growth After 5 Days  11/07/2016    URINECX >100,000 cfu/ml Escherichia coli (A) 07/12/2018    URINECX No Growth <100 cfu/mL 11/09/2016    URINECX 9844-8808 cfu/ml Staphylococcus coagulase negative 11/07/2016     Allergies: No Known Allergies  Medications:   Scheduled Meds:    Current Facility-Administered Medications:  acetaminophen 650 mg Rectal Q4H PRN Kristina Heman, DO    bisacodyl 10 mg Rectal Daily Merilee Peaches, DO    divalproex sodium 500 mg Oral [facility] Oleg Alvarado PA-C    heparin (porcine) 5,000 Units Subcutaneous Q8H [address] Sandy Srinivasan MD lacosamide (VIMPAT) IVPB 100 mg Intravenous Q12H Jennifer DO Ava Last Rate: Stopped (07/19/18 2228)   melatonin 3 mg Oral HS Bulmaro Roblero MD    OLANZapine 2 5 mg Oral HS Bulmaro Roblero MD    ondansetron 4 mg Intravenous Q6H PRN Sandy Higginbotham MD    ondansetron 4 mg Oral Once Anila Alicia PA-C    phenytoin 100 mg Intravenous Q8H Jennifer Ava DO    valproate sodium (DEPACON) IV bolus 500 mg Intravenous Q6H Karthikeyan Cooper PA-C Last Rate: Stopped (07/20/18 0314)     Continuous Infusions:   PRN Meds:    acetaminophen 650 mg Q4H PRN   ondansetron 4 mg Q6H PRN     VTE Pharmacologic Prophylaxis: Heparin  VTE Mechanical Prophylaxis: sequential compression device  Invasive lines and devices: Invasive Devices     Peripheral Intravenous Line            Peripheral IV 07/18/18 Right Antecubital 1 day          Drain            NG/OG/Enteral Tube Enteral Feeding Tube 8 Fr Right nares less than 1 day                     Portions of the record may have been created with voice recognition software  Occasional wrong word or "sound a like" substitutions may have occurred due to the inherent limitations of voice recognition software  Read the chart carefully and recognize, using context, where substitutions have occurred      [place], [place]

## 2018-07-20 NOTE — PROGRESS NOTES
Pt transferred from icu at this time  Pt comfortable, vitals are stable no needs at this time will continue to monitor patient  Bottom red and blanchable  keofeed tube on right nare with jevity  @ 50cc

## 2018-07-20 NOTE — SPEECH THERAPY NOTE
Speech Language/Pathology    Speech/Language Pathology Progress Note    Patient Name: Rona Pandey  OZCDE'W Date: 7/20/2018     Problem List  Patient Active Problem List   Diagnosis    Nephrolithiasis    Hypertension    Hyperbilirubinemia    Hyperlipidemia    Amaurosis fugax    Hearing loss    Subarachnoid hemorrhage (HCC)    Tinea pedis    Vitamin D deficiency    Subdural hematoma (HCC)    Lumbar disc disease    Fall    Bilateral shoulder pain    Peripheral edema    Weakness    Ambulatory dysfunction    Tremor    Thyroid nodule    Status epilepticus ([address] )    Hyperchloremia    Encephalopathy with underlying dementia        Past Medical History  Past Medical History:   Diagnosis Date    Amaurosis fugax 11/18/2016    Arthritis     History of subarachnoid hemorrhage     Hyperlipidemia     Hypertension     Nephrolithiasis     Thyroid nodule 7/17/2018        Past Surgical History  Past Surgical History:   Procedure Laterality Date    LITHOTRIPSY      [facility] FRAGMENT KIDNEY STONE/ ESWL Left 1/3/2017    Procedure: LITHROTRIPSY EXTRACORPORAL SHOCKWAVE (ESWL); Surgeon: Nikita Collazo MD;  Location: [facility];  Service: Urology    [facility] FRAGMENT KIDNEY STONE/ ESWL Right 11/18/2016    Procedure: Randy Rink SHOCKWAVE (ESWL); Surgeon: Nikita Collazo MD;  Location: [facility];  Service: Urology    URETERAL STENT PLACEMENT Bilateral 11/9/2016    Procedure: Rannie Hard; STENT INSERTION ;  Surgeon: Nikita Collazo MD;  [facility];  Service:          Subjective:  Maintains alertness only briefly  Voicing was stronger today (min response)  Remains on continuous eeg  Objective:  keofed in  Seen for po potential  Mouth open posture  Oral care done  trialed ice  Pt unable to suck and was not manipulatiing the ice  Needed max cues to close lips and was unable to do so  independently  Noted a swallow x 1  Pt was then grossly unresponsive  Nurse reported she is on sedating meds  Assessment:  Unable to maintain alertness and therefor not appropriate for PO  Plan/Recommendations:  ST will F/u  Suspect she will be able to start a diet when she is alert

## 2018-07-21 ENCOUNTER — APPOINTMENT (INPATIENT)
Dept: RADIOLOGY | Facility: HOSPITAL | Age: 83
DRG: 682 | End: 2018-07-21
Payer: MEDICARE

## 2018-07-21 ENCOUNTER — APPOINTMENT (INPATIENT)
Dept: NEUROLOGY | Facility: AMBULATORY SURGERY CENTER | Age: 83
DRG: 682 | End: 2018-07-21
Payer: MEDICARE

## 2018-07-21 PROBLEM — E87.0 HYPERNATREMIA: Status: ACTIVE | Noted: 2018-07-21

## 2018-07-21 LAB
ALBUMIN SERPL BCP-MCNC: 2.4 G/DL (ref 3.5–5)
ANION GAP SERPL CALCULATED.3IONS-SCNC: 6 MMOL/L (ref 4–13)
BASOPHILS # BLD AUTO: 0.02 THOUSANDS/ΜL (ref 0–0.1)
BASOPHILS NFR BLD AUTO: 0 % (ref 0–1)
BUN SERPL-MCNC: 20 MG/DL (ref 5–25)
CALCIUM SERPL-MCNC: 9.3 MG/DL (ref 8.3–10.1)
CHLORIDE SERPL-SCNC: 114 MMOL/L (ref 100–108)
CO2 SERPL-SCNC: 29 MMOL/L (ref 21–32)
CREAT SERPL-MCNC: 0.68 MG/DL (ref 0.6–1.3)
EOSINOPHIL # BLD AUTO: 0.26 THOUSAND/ΜL (ref 0–0.61)
EOSINOPHIL NFR BLD AUTO: 4 % (ref 0–6)
ERYTHROCYTE [DISTWIDTH] IN BLOOD BY AUTOMATED COUNT: 14.8 % (ref 11.6–15.1)
GFR SERPL CREATININE-BSD FRML MDRD: 78 ML/MIN/1.73SQ M
GLUCOSE SERPL-MCNC: 109 MG/DL (ref 65–140)
HCT VFR BLD AUTO: 42.3 % (ref 34.8–46.1)
HGB BLD-MCNC: 13.2 G/DL (ref 11.5–15.4)
IMM GRANULOCYTES # BLD AUTO: 0.02 THOUSAND/UL (ref 0–0.2)
IMM GRANULOCYTES NFR BLD AUTO: 0 % (ref 0–2)
LYMPHOCYTES # BLD AUTO: 1.36 THOUSANDS/ΜL (ref 0.6–4.47)
LYMPHOCYTES NFR BLD AUTO: 20 % (ref 14–44)
MCH RBC QN AUTO: 29.5 PG (ref 26.8–34.3)
MCHC RBC AUTO-ENTMCNC: 31.2 G/DL (ref 31.4–37.4)
MCV RBC AUTO: 94 FL (ref 82–98)
MONOCYTES # BLD AUTO: 0.52 THOUSAND/ΜL (ref 0.17–1.22)
MONOCYTES NFR BLD AUTO: 8 % (ref 4–12)
NEUTROPHILS # BLD AUTO: 4.59 THOUSANDS/ΜL (ref 1.85–7.62)
NEUTS SEG NFR BLD AUTO: 68 % (ref 43–75)
NRBC BLD AUTO-RTO: 0 /100 WBCS
PHENYTOIN SERPL-MCNC: 11.9 UG/ML (ref 10–20)
PHENYTOIN SERPL-MCNC: 12.6 UG/ML (ref 10–20)
PLATELET # BLD AUTO: 168 THOUSANDS/UL (ref 149–390)
PMV BLD AUTO: 10.3 FL (ref 8.9–12.7)
POTASSIUM SERPL-SCNC: 3.9 MMOL/L (ref 3.5–5.3)
RBC # BLD AUTO: 4.48 MILLION/UL (ref 3.81–5.12)
SODIUM SERPL-SCNC: 149 MMOL/L (ref 136–145)
VALPROATE SERPL-MCNC: 57 UG/ML (ref 50–100)
VALPROATE SERPL-MCNC: 60 UG/ML (ref 50–100)
WBC # BLD AUTO: 6.77 THOUSAND/UL (ref 4.31–10.16)

## 2018-07-21 PROCEDURE — 99232 SBSQ HOSP IP/OBS MODERATE 35: CPT | Performed by: PSYCHIATRY & NEUROLOGY

## 2018-07-21 PROCEDURE — 82040 ASSAY OF SERUM ALBUMIN: CPT | Performed by: PSYCHIATRY & NEUROLOGY

## 2018-07-21 PROCEDURE — 80164 ASSAY DIPROPYLACETIC ACD TOT: CPT | Performed by: PHYSICIAN ASSISTANT

## 2018-07-21 PROCEDURE — 95951 PR EEG MONITORING/VIDEORECORD: CPT | Performed by: PSYCHIATRY & NEUROLOGY

## 2018-07-21 PROCEDURE — 85025 COMPLETE CBC W/AUTO DIFF WBC: CPT | Performed by: PHYSICIAN ASSISTANT

## 2018-07-21 PROCEDURE — 94762 N-INVAS EAR/PLS OXIMTRY CONT: CPT

## 2018-07-21 PROCEDURE — 80185 ASSAY OF PHENYTOIN TOTAL: CPT | Performed by: PSYCHIATRY & NEUROLOGY

## 2018-07-21 PROCEDURE — 99232 SBSQ HOSP IP/OBS MODERATE 35: CPT | Performed by: INTERNAL MEDICINE

## 2018-07-21 PROCEDURE — 80048 BASIC METABOLIC PNL TOTAL CA: CPT | Performed by: PHYSICIAN ASSISTANT

## 2018-07-21 PROCEDURE — 80186 ASSAY OF PHENYTOIN FREE: CPT | Performed by: PSYCHIATRY & NEUROLOGY

## 2018-07-21 PROCEDURE — 74018 RADEX ABDOMEN 1 VIEW: CPT

## 2018-07-21 PROCEDURE — 80185 ASSAY OF PHENYTOIN TOTAL: CPT | Performed by: PHYSICIAN ASSISTANT

## 2018-07-21 PROCEDURE — 95951 HB EEG MONITORING/VIDEORECORD: CPT

## 2018-07-21 PROCEDURE — 80165 DIPROPYLACETIC ACID FREE: CPT | Performed by: PSYCHIATRY & NEUROLOGY

## 2018-07-21 PROCEDURE — 80164 ASSAY DIPROPYLACETIC ACD TOT: CPT | Performed by: PSYCHIATRY & NEUROLOGY

## 2018-07-21 RX ORDER — DEXTROSE AND SODIUM CHLORIDE 5; .45 G/100ML; G/100ML
60 INJECTION, SOLUTION INTRAVENOUS CONTINUOUS
Status: DISCONTINUED | OUTPATIENT
Start: 2018-07-21 | End: 2018-07-21

## 2018-07-21 RX ADMIN — SODIUM CHLORIDE 150 MG: 9 INJECTION, SOLUTION INTRAVENOUS at 21:46

## 2018-07-21 RX ADMIN — MELATONIN TAB 3 MG 3 MG: 3 TAB at 21:46

## 2018-07-21 RX ADMIN — PHENYTOIN SODIUM 100 MG: 50 INJECTION INTRAMUSCULAR; INTRAVENOUS at 22:06

## 2018-07-21 RX ADMIN — SODIUM CHLORIDE 150 MG: 9 INJECTION, SOLUTION INTRAVENOUS at 08:52

## 2018-07-21 RX ADMIN — Medication 8.8 MG: at 21:46

## 2018-07-21 RX ADMIN — HEPARIN SODIUM 5000 UNITS: 5000 INJECTION, SOLUTION INTRAVENOUS; SUBCUTANEOUS at 21:46

## 2018-07-21 RX ADMIN — VALPROATE SODIUM 500 MG: 100 INJECTION, SOLUTION INTRAVENOUS at 01:31

## 2018-07-21 RX ADMIN — VALPROATE SODIUM 500 MG: 100 INJECTION, SOLUTION INTRAVENOUS at 13:19

## 2018-07-21 RX ADMIN — HEPARIN SODIUM 5000 UNITS: 5000 INJECTION, SOLUTION INTRAVENOUS; SUBCUTANEOUS at 13:19

## 2018-07-21 RX ADMIN — HEPARIN SODIUM 5000 UNITS: 5000 INJECTION, SOLUTION INTRAVENOUS; SUBCUTANEOUS at 05:45

## 2018-07-21 RX ADMIN — BISACODYL 10 MG: 10 SUPPOSITORY RECTAL at 08:52

## 2018-07-21 RX ADMIN — VALPROATE SODIUM 500 MG: 100 INJECTION, SOLUTION INTRAVENOUS at 22:43

## 2018-07-21 RX ADMIN — PHENYTOIN SODIUM 100 MG: 50 INJECTION INTRAMUSCULAR; INTRAVENOUS at 06:50

## 2018-07-21 RX ADMIN — QUETIAPINE FUMARATE 25 MG: 25 TABLET ORAL at 21:46

## 2018-07-21 RX ADMIN — VALPROATE SODIUM 500 MG: 100 INJECTION, SOLUTION INTRAVENOUS at 06:50

## 2018-07-21 RX ADMIN — DEXTROSE AND SODIUM CHLORIDE 60 ML/HR: 5; .45 INJECTION, SOLUTION INTRAVENOUS at 11:29

## 2018-07-21 RX ADMIN — VALPROIC ACID 500 MG: 500 SOLUTION ORAL at 05:44

## 2018-07-21 RX ADMIN — PHENYTOIN SODIUM 100 MG: 50 INJECTION INTRAMUSCULAR; INTRAVENOUS at 13:19

## 2018-07-21 NOTE — PROGRESS NOTES
[address] Internal Medicine Progress Note  Patient: Sergey Fitzgerald 80 y o  female   MRN: [id number]  PCP: Floresita Johnson MD  Unit/Bed#: [facility] 715-01 Encounter: [id number]  Date Of Visit: 07/21/18    Assessment:    Principal Problem:    Status epilepticus ([facility] [address] )  Active Problems:    Hypertension    Weakness    Ambulatory dysfunction    Tremor    Thyroid nodule    Hyperchloremia    Encephalopathy with underlying dementia      Plan:    Patient was admitted on 07/12 from home due to generalized weakness and ambulatory dysfunction found to have UTI, then she developed left lower extremity tremor and focal status epilepticus  On 07/18 she was transferred to ICU due to toxic metabolic encephalopathy likely secondary to AEDs  1   Focal status epilepticus/ LLE tremor, managed by Neurology, awaiting brain MRI,still on vEEG, currently on Vimpat, Dilantin and Depakote  2  Encephalopathy multifactorial secondary to ongoing seizure, AEDs and delirium, continue Seroquel q h s   3   Dysphagia secondary to above, currently on Keofed tube feeding, speech is following  4  Hypernatremia, add free water to tube feeding, start hypotonic IV fluid if [employer] is not working  5   E coli UTI, resolved , completed 5 day course of antibiotics  6  Generalized weakness and Ambulatory dysfunction, PT recommending STR   7  Left thyroid nodule, normal TSH,  outpatient thyroid ultrasound  8  Labile BP, monitor  9  Hyperlipidemia       VTE Pharmacologic Prophylaxis:   Pharmacologic: Heparin  Mechanical VTE Prophylaxis in Place: Yes    Patient Centered Rounds: I have performed bedside rounds with nursing staff today  Discussions with Specialists or Other Care Team Provider:     Education and Discussions with Family / Patient:  Patient     Time Spent for Care: 30 minutes  More than 50% of total time spent on counseling and coordination of care as described above      Current Length of Stay: 9 day(s)    Current Patient Status: Inpatient Certification Statement: The patient will continue to require additional inpatient hospital stay due to Management focal status epilepticus    Discharge Plan / Estimated Discharge Date: not ready yet    Code Status: Level 3 - DNAR and DNI      Subjective:   Patient seen and examined  Drowsy, denied pain  on video EEG    Objective:     Vitals:   Temp (24hrs), Av 4 °F (36 9 °C), Min:97 8 °F (36 6 °C), Max:98 8 °F (37 1 °C)    HR:  [74-95] 75  Resp:  [16-38] 16  BP: (108-143)/(44-80) 134/49  SpO2:  [95 %-100 %] 99 %  Body mass index is 25 93 kg/m²  Input and Output Summary (last 24 hours): Intake/Output Summary (Last 24 hours) at 18 1050  Last data filed at 18 0800   Gross per 24 hour   Intake              100 ml   Output              155 ml   Net              -55 ml       Physical Exam:     Physical Exam  Patient is drowsy,  in no acute distress, currently on video EEG  Lung clear to auscultation bilateral anteriorly  Heart positive S1-S2 no murmur  Abdomen soft mild generalized tenderness to deep palpation positive bowel sounds  Lower extremities no edema    Additional Data:     Labs:      Results from last 7 days  Lab Units 18  0553   WBC Thousand/uL 6 77   HEMOGLOBIN g/dL 13 2   HEMATOCRIT % 42 3   PLATELETS Thousands/uL 168   NEUTROS PCT % 68   LYMPHS PCT % 20   MONOS PCT % 8   EOS PCT % 4       Results from last 7 days  Lab Units 18  0553   SODIUM mmol/L 149*   POTASSIUM mmol/L 3 9   CHLORIDE mmol/L 114*   CO2 mmol/L 29   BUN mg/dL 20   CREATININE mg/dL 0 68   CALCIUM mg/dL 9 3   GLUCOSE RANDOM mg/dL 109           * I Have Reviewed All Lab Data Listed Above  * Additional Pertinent Lab Tests Reviewed:  [address] Admission Reviewed    Imaging:    Imaging Reports Reviewed Today Include:   Imaging Personally Reviewed by Myself Includes:      Recent Cultures (last 7 days):           Last 24 Hours Medication List:     Current Facility-Administered Medications:  acetaminophen 650 mg Per NG Tube Q6H PRN GRACY KELSEY    bisacodyl 10 mg Rectal Daily Madison Duarte DO    heparin (porcine) 5,000 Units Subcutaneous Q8H [facility] Sandy Higginbotham MD    lacosamide (VIMPAT) IVPB 150 mg Intravenous Q12H Luke Reardon MD Last Rate: 150 mg (07/21/18 0852)   melatonin 3 mg Oral HS Ernesto Cuba MD    ondansetron 4 mg Intravenous Q6H PRN Sandy Luciano Aas, MD    phenytoin 100 mg Intravenous Q8H Jennifer Escalante DO    QUEtiapine 25 mg Oral HS Rachael Montes PA-C    senna 8 8 mg Per NG Tube HS Rachael Montes PA-C    valproate sodium 500 mg Intravenous Q8H [facility] Larissa Lopes PA-C Last Rate: 500 mg (07/21/18 0650)        Today, Patient Was Seen By: Geovanny Borjas DO    ** Please Note: This note has been constructed using a voice recognition system   **

## 2018-07-22 ENCOUNTER — APPOINTMENT (INPATIENT)
Dept: NEUROLOGY | Facility: AMBULATORY SURGERY CENTER | Age: 83
DRG: 682 | End: 2018-07-22
Payer: MEDICARE

## 2018-07-22 LAB
ANION GAP SERPL CALCULATED.3IONS-SCNC: 4 MMOL/L (ref 4–13)
BUN SERPL-MCNC: 26 MG/DL (ref 5–25)
CALCIUM SERPL-MCNC: 8.5 MG/DL (ref 8.3–10.1)
CHLORIDE SERPL-SCNC: 116 MMOL/L (ref 100–108)
CO2 SERPL-SCNC: 30 MMOL/L (ref 21–32)
CREAT SERPL-MCNC: 0.57 MG/DL (ref 0.6–1.3)
GFR SERPL CREATININE-BSD FRML MDRD: 83 ML/MIN/1.73SQ M
GLUCOSE SERPL-MCNC: 99 MG/DL (ref 65–140)
MAGNESIUM SERPL-MCNC: 2.3 MG/DL (ref 1.6–2.6)
PHOSPHATE SERPL-MCNC: 3.6 MG/DL (ref 2.3–4.1)
POTASSIUM SERPL-SCNC: 3.8 MMOL/L (ref 3.5–5.3)
SODIUM SERPL-SCNC: 150 MMOL/L (ref 136–145)

## 2018-07-22 PROCEDURE — 94762 N-INVAS EAR/PLS OXIMTRY CONT: CPT

## 2018-07-22 PROCEDURE — 83735 ASSAY OF MAGNESIUM: CPT | Performed by: INTERNAL MEDICINE

## 2018-07-22 PROCEDURE — 80048 BASIC METABOLIC PNL TOTAL CA: CPT | Performed by: INTERNAL MEDICINE

## 2018-07-22 PROCEDURE — 99232 SBSQ HOSP IP/OBS MODERATE 35: CPT | Performed by: INTERNAL MEDICINE

## 2018-07-22 PROCEDURE — 84100 ASSAY OF PHOSPHORUS: CPT | Performed by: INTERNAL MEDICINE

## 2018-07-22 PROCEDURE — 95951 HB EEG MONITORING/VIDEORECORD: CPT

## 2018-07-22 PROCEDURE — 95951 PR EEG MONITORING/VIDEORECORD: CPT | Performed by: PSYCHIATRY & NEUROLOGY

## 2018-07-22 PROCEDURE — 99232 SBSQ HOSP IP/OBS MODERATE 35: CPT | Performed by: PSYCHIATRY & NEUROLOGY

## 2018-07-22 RX ORDER — DEXTROSE AND SODIUM CHLORIDE 5; .33 G/100ML; G/100ML
40 INJECTION, SOLUTION INTRAVENOUS CONTINUOUS
Status: DISCONTINUED | OUTPATIENT
Start: 2018-07-22 | End: 2018-07-28

## 2018-07-22 RX ADMIN — HEPARIN SODIUM 5000 UNITS: 5000 INJECTION, SOLUTION INTRAVENOUS; SUBCUTANEOUS at 21:48

## 2018-07-22 RX ADMIN — HEPARIN SODIUM 5000 UNITS: 5000 INJECTION, SOLUTION INTRAVENOUS; SUBCUTANEOUS at 13:06

## 2018-07-22 RX ADMIN — HEPARIN SODIUM 5000 UNITS: 5000 INJECTION, SOLUTION INTRAVENOUS; SUBCUTANEOUS at 05:09

## 2018-07-22 RX ADMIN — PHENYTOIN SODIUM 100 MG: 50 INJECTION INTRAMUSCULAR; INTRAVENOUS at 05:45

## 2018-07-22 RX ADMIN — MELATONIN TAB 3 MG 3 MG: 3 TAB at 21:48

## 2018-07-22 RX ADMIN — PHENYTOIN SODIUM 100 MG: 50 INJECTION INTRAMUSCULAR; INTRAVENOUS at 21:49

## 2018-07-22 RX ADMIN — BISACODYL 10 MG: 10 SUPPOSITORY RECTAL at 08:51

## 2018-07-22 RX ADMIN — SODIUM CHLORIDE 150 MG: 9 INJECTION, SOLUTION INTRAVENOUS at 23:37

## 2018-07-22 RX ADMIN — QUETIAPINE FUMARATE 25 MG: 25 TABLET ORAL at 21:48

## 2018-07-22 RX ADMIN — PHENYTOIN SODIUM 100 MG: 50 INJECTION INTRAMUSCULAR; INTRAVENOUS at 13:06

## 2018-07-22 RX ADMIN — DEXTROSE AND SODIUM CHLORIDE 75 ML/HR: 5; .33 INJECTION, SOLUTION INTRAVENOUS at 13:06

## 2018-07-22 RX ADMIN — VALPROATE SODIUM 500 MG: 100 INJECTION, SOLUTION INTRAVENOUS at 21:46

## 2018-07-22 RX ADMIN — SODIUM CHLORIDE 150 MG: 9 INJECTION, SOLUTION INTRAVENOUS at 10:37

## 2018-07-22 RX ADMIN — VALPROATE SODIUM 500 MG: 100 INJECTION, SOLUTION INTRAVENOUS at 05:11

## 2018-07-22 RX ADMIN — Medication 8.8 MG: at 21:48

## 2018-07-22 RX ADMIN — VALPROATE SODIUM 500 MG: 100 INJECTION, SOLUTION INTRAVENOUS at 13:06

## 2018-07-22 NOTE — PROGRESS NOTES
Neurology - Progress Note  Sergio Goodrich 80 y o  female MRN: [id number]  Unit/Bed#: [facility] 715-01 Encounter: [id number]    Assessment:  Toxic/metabolic encephalopathy 2 to recent uti/medication effect/sleep wake cycle disturbance/stress from recent seizures/postictal effect superimposed on reduced cognitive reserve-strong suspicion for cognitive impairment mild per son, vascular component  Background irritability still seen however no active seizures  Additionally patient developing volume depletion and per conversation with SLIM team will be corrected as this can also stress the body and restart seizures  She has had significant stress to her brain and will likely take a few days to slowly come around as long as the seizure activity does not return  Plan:  If she continues to have no left foot involuntary movements, will take off video eeg this afternoon  Neurology team will continue to intermittenly evaluate for clinical improvement-specifically improvement of cognition and if she redevelops the LLE movements then will likely need to be placed back on video eeg monitoring if able to take off today  IV hydration  Mri brain w/wo contrast    Subjective:   Per nursing no overnight issues and she continues to not display LLE involuntary movements  ROS:  Per 12 point review, unable to adequately obtain due to state of mentation  Continues to say ouch if touching her legs and when asked if in pain she responded "NO"  Vitals: Blood pressure (!) 123/49, pulse 74, temperature (!) 97 3 °F (36 3 °C), temperature source Axillary, resp  rate 18, height 5' 2" (1 575 m), weight 66 4 kg (146 lb 6 2 oz), SpO2 98 %, not currently breastfeeding  ,Body mass index is 26 77 kg/m²  Physical Exam:    General appearance: somnolent, easily arousable to verbal stim  Head: Normocephalic, without obvious abnormality, atraumatic, currently has leads on for video eeg        Neurologic: Mental status: somnolent, oriented to self, hospital, not sure which one  +, July, incorrect year, [place]  Can follow simple commands briefly then doses off  Attention/concentration impaired  Cn 2-12: able to fully move eyes in all directions today on command  No facial asymmetry or dysarthria  Motor: 5 power handgrip/push/pull ue bilat  Can wiggle toes on command, rt foot and left foot independently  Lab, Imaging and other studies: I have personally reviewed pertinent reports  Total 37 min spent evaluating patient, reviewing labs, discussing with epilepsy team, coming up with assessment/plan and >50% of the time spent counseling son

## 2018-07-22 NOTE — PROGRESS NOTES
[facility] Progress Note  Patient: Luis Angel Petersonr 80 y o  female   MRN: [id number]  PCP: Caitlin Monroe MD  Unit/Bed#: [facility] 715-01 Encounter: [id number]  Date Of Visit: 07/22/18    Assessment:    Principal Problem:    Status epilepticus ([address] )  Active Problems:    Hypertension    Weakness    Ambulatory dysfunction    Tremor    Thyroid nodule    Hyperchloremia    Encephalopathy with underlying dementia    Hypernatremia      Plan:    Patient was admitted on 07/12 from home due to generalized weakness and ambulatory dysfunction found to have UTI, then she developed left lower extremity tremor and focal status epilepticus  On 07/18 she was transferred to ICU due to toxic metabolic encephalopathy likely secondary to AEDs  1   Focal status epilepticus/ LLE tremor, managed by Neurology, still on vEEG, currently on Vimpat, Dilantin and Depakote, awaiting brain MRI  2  Encephalopathy multifactorial secondary to ongoing seizure, AEDs and delirium, likely superimposed on cognitive impairment,  continue Seroquel q h s   3   Dysphagia secondary to above, currently on Keofed tube feeding, speech is following  4  Hypernatremia, worsening today,  start hypotonic IV fluid, free water to tube feeding, monitor  5   E coli UTI, resolved , completed 5 day course of antibiotics  6  Generalized weakness and Ambulatory dysfunction, PT recommending STR   7  Left thyroid nodule, normal TSH,  outpatient thyroid ultrasound  8  Labile BP, monitor  9  Hyperlipidemia       VTE Pharmacologic Prophylaxis:   Pharmacologic: Heparin  Mechanical VTE Prophylaxis in Place: Yes    Patient Centered Rounds: I have performed bedside rounds with nursing staff today  Discussions with Specialists or Other Care Team Provider:  Neurology    Education and Discussions with Family / Patient:  Patient son    Time Spent for Care: 30 minutes    More than 50% of total time spent on counseling and coordination of care as described above     Current Length of Stay: 10 day(s)    Current Patient Status: Inpatient   Certification Statement: The patient will continue to require additional inpatient hospital stay due to Management focal status epilepticus    Discharge Plan / Estimated Discharge Date: not ready yet    Code Status: Level 3 - DNAR and DNI      Subjective:   Patient seen and examined  Drowsy  No event overnight  Still on video EEG, no more left lower extremity involuntary movement  Tolerating tube feeding  Son at bedside      Objective:     Vitals:   Temp (24hrs), Av 4 °F (36 3 °C), Min:97 3 °F (36 3 °C), Max:97 7 °F (36 5 °C)    HR:  [65-77] 74  Resp:  [16-19] 18  BP: ()/(37-68) 123/49  SpO2:  [95 %-100 %] 98 %  Body mass index is 26 77 kg/m²  Input and Output Summary (last 24 hours): Intake/Output Summary (Last 24 hours) at 18 1150  Last data filed at 18 0900   Gross per 24 hour   Intake              156 ml   Output              517 ml   Net             -361 ml       Physical Exam:     Physical Exam  Patient is somnolent, arousable to verbal stimuli in no acute distress, currently on video EEG  Lung clear to auscultation bilateral anteriorly  Heart positive S1-S2 no murmur  Abdomen soft  positive bowel sounds  Lower extremities no edema    Additional Data:     Labs:      Results from last 7 days  Lab Units 18  0553   WBC Thousand/uL 6 77   HEMOGLOBIN g/dL 13 2   HEMATOCRIT % 42 3   PLATELETS Thousands/uL 168   NEUTROS PCT % 68   LYMPHS PCT % 20   MONOS PCT % 8   EOS PCT % 4       Results from last 7 days  Lab Units 18  0527   SODIUM mmol/L 150*   POTASSIUM mmol/L 3 8   CHLORIDE mmol/L 116*   CO2 mmol/L 30   BUN mg/dL 26*   CREATININE mg/dL 0 57*   CALCIUM mg/dL 8 5   GLUCOSE RANDOM mg/dL 99           * I Have Reviewed All Lab Data Listed Above  * Additional Pertinent Lab Tests Reviewed:  All Labs For Current Hospital Admission Reviewed    Imaging:    Imaging Reports Reviewed Today Include: Imaging Personally Reviewed by Myself Includes:      Recent Cultures (last 7 days):           Last 24 Hours Medication List:     Current Facility-Administered Medications:  acetaminophen 650 mg Per NG Tube Q6H PRN Sanjana Boyce PA-C    bisacodyl 10 mg Rectal Daily Angelo Preciado DO    heparin (porcine) 5,000 Units Subcutaneous Q8H [facility] Sandy Higginbotham MD    lacosamide (VIMPAT) IVPB 150 mg Intravenous Q12H Thanh Muir MD Last Rate: 150 mg (07/22/18 1037)   melatonin 3 mg Oral HS Dawood Segundo MD    ondansetron 4 mg Intravenous Q6H PRN Sandy Yancey MD    phenytoin 100 mg Intravenous Q8H Jennifer Escalante DO    QUEtiapine 25 mg Oral HS Rachael Montes PA-C    senna 8 8 mg Per NG Tube HS Rachael Montes PA-C    valproate sodium 500 mg Intravenous Q8H [facility] Larissa Lopes PA-C Last Rate: 500 mg (07/22/18 0511)        Today, Patient Was Seen By: Laquita Carranza DO    ** Please Note: This note has been constructed using a voice recognition system   **

## 2018-07-22 NOTE — PROGRESS NOTES
Neurology - Progress Note  Ramesh Feldman 80 y o  female MRN: [id number]  Unit/Bed#: [facility] 477-55 Encounter: [id number]    Assessment:  Epilepsia Partialis Continua improved  Still electrographic events occurring however clinical events have not been present  Plan: We will review video eeg tomorrow and clinical presentation and determine if medication adjustments needed or additional agents need to be added  Topamax is one consideration  Albumin level, free/total dilantin and depacon trough levels before evening dose today  ROS:  Per 12 point review, complaining of pain but cannot pinpoint where  Vitals: Blood pressure (!) 117/43, pulse 72, temperature (!) 97 4 °F (36 3 °C), temperature source Axillary, resp  rate 18, height 5' 2" (1 575 m), weight 64 3 kg (141 lb 12 1 oz), SpO2 97 %, not currently breastfeeding  ,Body mass index is 25 93 kg/m²  Physical Exam:    General appearance: uncomfortable, yelling  Lungs: clear to auscultation bilaterally  Heart: regular rate and rhythm    Neurologic: Mental status: Alert and oriented to self, location , thought content appropriate  CN: horizontal gaze limitation both eyes when looking to right and left  No nystagmus noted  No facial asymmetry, no dysarthria  Motor: At least 4+ power bilat ue for handgrip, pull and push  2 power gross movement bilat LE, slightly more in the LLE  Pain limited  LLE involuntary movement not seen  Sensory: light touch to anywhere on her body is eliciting discomfort  Plantars: downgoing bilat              Lab, Imaging and other studies: I have personally reviewed pertinent reports  Total 25 min time spent evaluating patient and coming up with assessment/plan

## 2018-07-23 ENCOUNTER — APPOINTMENT (INPATIENT)
Dept: RADIOLOGY | Facility: HOSPITAL | Age: 83
DRG: 682 | End: 2018-07-23
Payer: MEDICARE

## 2018-07-23 PROBLEM — E87.6 HYPOKALEMIA: Status: ACTIVE | Noted: 2018-07-23

## 2018-07-23 PROBLEM — R25.1 TREMOR: Status: RESOLVED | Noted: 2018-07-16 | Resolved: 2018-07-23

## 2018-07-23 LAB
ANION GAP SERPL CALCULATED.3IONS-SCNC: 7 MMOL/L (ref 4–13)
BUN SERPL-MCNC: 23 MG/DL (ref 5–25)
CALCIUM SERPL-MCNC: 7.4 MG/DL (ref 8.3–10.1)
CHLORIDE SERPL-SCNC: 113 MMOL/L (ref 100–108)
CO2 SERPL-SCNC: 25 MMOL/L (ref 21–32)
CREAT SERPL-MCNC: 0.64 MG/DL (ref 0.6–1.3)
GFR SERPL CREATININE-BSD FRML MDRD: 80 ML/MIN/1.73SQ M
GLUCOSE SERPL-MCNC: 321 MG/DL (ref 65–140)
POTASSIUM SERPL-SCNC: 3.3 MMOL/L (ref 3.5–5.3)
SODIUM SERPL-SCNC: 145 MMOL/L (ref 136–145)

## 2018-07-23 PROCEDURE — 99232 SBSQ HOSP IP/OBS MODERATE 35: CPT | Performed by: INTERNAL MEDICINE

## 2018-07-23 PROCEDURE — 92526 ORAL FUNCTION THERAPY: CPT

## 2018-07-23 PROCEDURE — 70553 MRI BRAIN STEM W/O & W/DYE: CPT

## 2018-07-23 PROCEDURE — 80048 BASIC METABOLIC PNL TOTAL CA: CPT | Performed by: INTERNAL MEDICINE

## 2018-07-23 PROCEDURE — A9585 GADOBUTROL INJECTION: HCPCS | Performed by: PSYCHIATRY & NEUROLOGY

## 2018-07-23 PROCEDURE — 99233 SBSQ HOSP IP/OBS HIGH 50: CPT | Performed by: PHYSICIAN ASSISTANT

## 2018-07-23 RX ORDER — ACETAMINOPHEN 325 MG/1
975 TABLET ORAL EVERY 8 HOURS SCHEDULED
Status: DISCONTINUED | OUTPATIENT
Start: 2018-07-23 | End: 2018-07-27

## 2018-07-23 RX ORDER — POTASSIUM CHLORIDE 20MEQ/15ML
40 LIQUID (ML) ORAL ONCE
Status: COMPLETED | OUTPATIENT
Start: 2018-07-23 | End: 2018-07-23

## 2018-07-23 RX ADMIN — QUETIAPINE FUMARATE 25 MG: 25 TABLET ORAL at 23:03

## 2018-07-23 RX ADMIN — VALPROATE SODIUM 500 MG: 100 INJECTION, SOLUTION INTRAVENOUS at 23:52

## 2018-07-23 RX ADMIN — DEXTROSE AND SODIUM CHLORIDE 75 ML/HR: 5; .33 INJECTION, SOLUTION INTRAVENOUS at 08:14

## 2018-07-23 RX ADMIN — GADOBUTROL 6 ML: 604.72 INJECTION INTRAVENOUS at 22:14

## 2018-07-23 RX ADMIN — SODIUM CHLORIDE 150 MG: 9 INJECTION, SOLUTION INTRAVENOUS at 10:20

## 2018-07-23 RX ADMIN — ACETAMINOPHEN 975 MG: 325 TABLET, FILM COATED ORAL at 23:03

## 2018-07-23 RX ADMIN — PHENYTOIN SODIUM 100 MG: 50 INJECTION INTRAMUSCULAR; INTRAVENOUS at 23:04

## 2018-07-23 RX ADMIN — PHENYTOIN SODIUM 100 MG: 50 INJECTION INTRAMUSCULAR; INTRAVENOUS at 13:06

## 2018-07-23 RX ADMIN — POTASSIUM CHLORIDE 40 MEQ: 20 SOLUTION ORAL at 11:27

## 2018-07-23 RX ADMIN — HEPARIN SODIUM 5000 UNITS: 5000 INJECTION, SOLUTION INTRAVENOUS; SUBCUTANEOUS at 05:17

## 2018-07-23 RX ADMIN — VALPROATE SODIUM 500 MG: 100 INJECTION, SOLUTION INTRAVENOUS at 05:15

## 2018-07-23 RX ADMIN — SODIUM CHLORIDE 150 MG: 9 INJECTION, SOLUTION INTRAVENOUS at 23:03

## 2018-07-23 RX ADMIN — BISACODYL 10 MG: 10 SUPPOSITORY RECTAL at 08:14

## 2018-07-23 RX ADMIN — PHENYTOIN SODIUM 100 MG: 50 INJECTION INTRAMUSCULAR; INTRAVENOUS at 05:17

## 2018-07-23 RX ADMIN — MELATONIN TAB 3 MG 3 MG: 3 TAB at 23:03

## 2018-07-23 RX ADMIN — HEPARIN SODIUM 5000 UNITS: 5000 INJECTION, SOLUTION INTRAVENOUS; SUBCUTANEOUS at 23:04

## 2018-07-23 RX ADMIN — VALPROATE SODIUM 500 MG: 100 INJECTION, SOLUTION INTRAVENOUS at 13:06

## 2018-07-23 RX ADMIN — HEPARIN SODIUM 5000 UNITS: 5000 INJECTION, SOLUTION INTRAVENOUS; SUBCUTANEOUS at 13:06

## 2018-07-23 NOTE — PROGRESS NOTES
Progress Note - Neurology   Rona Pandey 80 y o  female [id number]  Unit/Bed#: [facility] 715/[facility] 715-01    Assessment:  Yanira Lopez is a 80 y  o  left handed female with history of amaurosis fugax in 11/2016, ICH in 6/2016, traumatic SAH in 2/2018 after falling when shoveling snow, hypertension, hyperlipidemia,  who present to the hospital on 07/12/2018 secondary to complaints of generalized weakness with inability to get out of her chair which started the day prior  Patient was found to have UTI and is currently being treated for this  Patient developed left upper extremity shaking which resolved, followed by a left lower extremity shaking 2 days later  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  AED regimen has been adjusted  No further clinical seizures noted  vEEg was discontinued  Plan:  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  LLE jerking has resolved  No further clinical signs of seizures  vEEG has been discontinued  CT head negative for acute abnormality  Repeat stat CTH negative for acute abnormality  MRI brain with and without contrast pending  Current AED regimen:   - Lacosamide (Vimpat) 150mg q 12 hours   - Phenytoin (Dilantin) 100mg q 8 hours - 7/21 total level: 11 9 - level from this AM pending   - Depacon 500mg q 8 hours - 7/21 total level 57 - level from this AM pending  Tele  Seizure precautions    Encephalopathy  Toxic metabolic secondary to recent UTI/medication effect/sleep-wake cycle disturbance /stress from recent seizure/postictal effect superimposed on reduce cognitive reserve/likely cognitive impairment   Anticipate that this will improve over time, but it may take several days  May consider adjusting AED regimen now that seizure has been stopped, but will further discuss with Attending Neurologist and await re-evaluation tomorrow  Recommend consideration of 1:1 vs restraints if possible    As patient's mentation improves re-evaluate swallow  Frequent neurological checks  Stat CT head with acute decline of in exam/mental status  Notify neurology with any changes in examination  BUE edema  Spoke with primary team  Recommended removal of L ring    Ecoli UTI  Treatment completed     Generalized weakness and ambulatory dysfunction  OT/PT  Recommending STR    Subjective:   Estephania Merino is a 80 y o  left handed female with history of amaurosis fugax in 11/2016, hypertension, hyperlipidemia, traumatic SAH in 2/2018 after falling when shoveling snow who presented to the hospital on 07/12/2018 secondary to complaints of generalized weakness with inability to get out of her chair   CT head was negative for acute abnormality   A small focus of encephalomalacia at the left frontoparietal junction, the sequelae of prior hemorrhage, was noted  UA noted to be positive  Patient also with c/o LLQ discomfort  CT A/P revealed diverticula and nonobstructive nephrolithiasis  Antibiotics were started  Patient developed left upper extremity shaking which resolved, followed by a left lower extremity shaking 2 days later  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  Multiple AEDs trialed  Patient is currently on Vimpat, Dilantin and Depakote  No further clinical evidence of seizures  vEEG was discontinued  Patient continues to have encephalopathy and agitation  Today on exam, the patient is supine in bed with soft wrist restraints in place on BUEs  Patient states she feels "lousy " She states that she hurts "all over "  When asked if she would like to be position she states "I don't care "  The patient is alert and oriented to person, place and time  She is not oriented to situation  She denies chest pain, shortness of breath  There is no family at bedside at this time      Past Medical History:   Diagnosis Date    Amaurosis fugax 11/18/2016    Arthritis     History of subarachnoid hemorrhage  Hyperlipidemia     Hypertension     Nephrolithiasis     Thyroid nodule 7/17/2018     Past Surgical History:   Procedure Laterality Date    LITHOTRIPSY      AL FRAGMENT KIDNEY STONE/ ESWL Left 1/3/2017    Procedure: LITHROTRIPSY EXTRACORPORAL SHOCKWAVE (ESWL); Surgeon: Adams Gross MD;  [facility];  Service: Urology    AL FRAGMENT KIDNEY STONE/ ESWL Right 11/18/2016    Procedure: Real Missy SHOCKWAVE (ESWL); Surgeon: Adams Gross MD;  [facility];  Service: Urology    URETERAL STENT PLACEMENT Bilateral 11/9/2016    Procedure: Sheryle Lehmann; Maicol Thomas ;  Surgeon: Adams Gross MD;  [facility];  Service:      Family history:  Family History   Problem Relation Age of Onset    Pneumonia Mother     Cancer Sister     Prostate cancer Brother        Social History     Social History    Marital status:      Spouse name: N/A    Number of children: N/A    Years of education: N/A     Social History Main Topics    Smoking status: Never Smoker    Smokeless tobacco: Never Used    Alcohol use No    Drug use: No    Sexual activity: Not Asked     Other Topics Concern    None     Social History Narrative    None       Scheduled Meds:    Current Facility-Administered Medications:  acetaminophen 650 mg Per NG Tube Q6H PRN Rachael Montes PA-C    bisacodyl 10 mg Rectal Daily Angelo Halo, DO    dextrose 5 % and sodium chloride 0 33 % 40 mL/hr Intravenous Continuous Laquita [place], DO Last Rate: 40 mL/hr (07/23/18 1127)   heparin (porcine) 5,000 Units Subcutaneous Q8H [address] Sandy Higginbotham MD    lacosamide (VIMPAT) IVPB 150 mg Intravenous Q12H Thanh Muir MD Last Rate: 150 mg (07/23/18 1020)   melatonin 3 mg Oral HS Dawood Segundo MD    ondansetron 4 mg Intravenous Q6H PRN Sandy Higginbotham MD    phenytoin 100 mg Intravenous Q8H Jennifer Escalante DO    QUEtiapine 25 mg Oral HS Rachael Montes PA-C    senna 8 8 mg Per NG Tube HS Rachael GRACY Montes    valproate sodium 500 mg Intravenous Q8H [facility] Larissa Lopes PA-C Last Rate: 500 mg (07/23/18 1306)     Continuous Infusions:    dextrose 5 % and sodium chloride 0 33 % 40 mL/hr Last Rate: 40 mL/hr (07/23/18 1127)     PRN Meds:   acetaminophen    ondansetron    ROS: Unable to be obtained ( other than complaints of peripheral IV irritation) due to AMS  Vitals: /52 (BP Location: Left arm)   Pulse 70   Temp 97 9 °F (36 6 °C) (Axillary)   Resp 18   Ht 5' 2" (1 575 m)   Wt 68 1 kg (150 lb 2 1 oz)   SpO2 97%   BMI 27 46 kg/m²     Physical Exam:  Physical Exam   Constitutional: She appears well-developed and well-nourished  Elderly female, supine in bed  Bilateral soft wrist restraints in place  Patient does not initially appear in distress though when asked she states she is lousy   HENT:   Head: Normocephalic and atraumatic  Eyes: Conjunctivae are normal  Pupils are equal, round, and reactive to light  Right eye exhibits no discharge  Left eye exhibits no discharge  No scleral icterus  Patient does not track examiner when asked   Neck: Normal range of motion  Neck supple  Cardiovascular: Normal rate and regular rhythm  Exam reveals no gallop and no friction rub  No murmur heard  Pulmonary/Chest: Effort normal and breath sounds normal  No stridor  No respiratory distress  She has no wheezes  She has no rales  She exhibits no tenderness  Musculoskeletal: Normal range of motion  She exhibits no edema, tenderness or deformity  Lymphadenopathy:     She has no cervical adenopathy  Neurological:   Drowsy but able to maintain and alert state during my exam   Skin: Skin is warm and dry  No rash noted  No erythema  Mild ecchymosis on arms bilaterally as well as some edema  Of note left hand edema is noted  Left 4th digit ring tight as a result     Psychiatric:   Depressed mood     Neurologic Exam     Mental Status   Drowsy, but able to maintain and alert state during my exam  The patient is oriented to person, place, month and year  She does not recall why she is here  She is able to follow simple motor commands  Require increased effort or speech, but no dysarthria or aphasia noted     Cranial Nerves     CN II   Visual acuity: normal (Grossly)    CN III, IV, VI   Pupils are equal, round, and reactive to light  Conjugate gaze: present    CN VII   Facial expression full, symmetric  Does not track examiner when asked, though I feel this is secondary to depressed affect     Motor Exam   Muscle bulk: decreased  Overall strength continues to decline  Patient able to lift bilateral upper extremities antigravity for 5 seconds with increased effort  I am able to range her passively to approximately 100° bilaterally without pain, which is different than prior exams  RUE:  Full  strength  LUE:   strength 4+    RLE:  Able to wiggle toes and dorsi/plantar flex against gravity, otherwise grossly 2+/3-    LLE:  Minimal toe wiggling, Trace, with increased effort required for attempts       Labs:  Recent Results (from the past 24 hour(s))   Basic metabolic panel    Collection Time: 07/23/18  5:31 AM   Result Value Ref Range    Sodium 145 136 - 145 mmol/L    Potassium 3 3 (L) 3 5 - 5 3 mmol/L    Chloride 113 (H) 100 - 108 mmol/L    CO2 25 21 - 32 mmol/L    Anion Gap 7 4 - 13 mmol/L    BUN 23 5 - 25 mg/dL    Creatinine 0 64 0 60 - 1 30 mg/dL    Glucose 321 (H) 65 - 140 mg/dL    Calcium 7 4 (L) 8 3 - 10 1 mg/dL    eGFR 80 ml/min/1 73sq m     Imaging: I have personally reviewed pertinent imaging and PACS reports       VTE Prophylaxis: Heparin

## 2018-07-23 NOTE — PLAN OF CARE
DISCHARGE PLANNING - CARE MANAGEMENT     Discharge to post-acute care or home with appropriate resources Progressing        GENITOURINARY - ADULT     Maintains or returns to baseline urinary function Progressing     Absence of urinary retention Progressing        METABOLIC, FLUID AND ELECTROLYTES - ADULT     Electrolytes maintained within normal limits Progressing     Fluid balance maintained Progressing        Nutrition/Hydration-ADULT     Nutrient/Hydration intake appropriate for improving, restoring or maintaining nutritional needs Progressing        Potential for Falls     Patient will remain free of falls Progressing        Prexisting or High Potential for Compromised Skin Integrity     Skin integrity is maintained or improved Progressing        SAFETY,RESTRAINT: NV/NON-SELF DESTRUCTIVE BEHAVIOR     Remains free of harm/injury (restraint for non violent/non self-detsructive behavior) Progressing     Returns to optimal restraint-free functioning Progressing

## 2018-07-23 NOTE — SPEECH THERAPY NOTE
Speech Language/Pathology    Speech/Language Pathology Progress Note    Patient Name: Justice Walton  WESPK'L Date: 7/23/2018     Problem List  Patient Active Problem List   Diagnosis    Nephrolithiasis    Hypertension    Hyperbilirubinemia    Hyperlipidemia    Amaurosis fugax    Hearing loss    Subarachnoid hemorrhage (HCC)    Tinea pedis    Vitamin D deficiency    Subdural hematoma (HCC)    Lumbar disc disease    Fall    Bilateral shoulder pain    Peripheral edema    Weakness    Ambulatory dysfunction    Thyroid nodule    Status epilepticus (HCC)    Hyperchloremia    Encephalopathy with underlying dementia    Hypernatremia    Hypokalemia        Past Medical History  Past Medical History:   Diagnosis Date    Amaurosis fugax 11/18/2016    Arthritis     History of subarachnoid hemorrhage     Hyperlipidemia     Hypertension     Nephrolithiasis     Thyroid nodule 7/17/2018        Past Surgical History  Past Surgical History:   Procedure Laterality Date    LITHOTRIPSY      MA FRAGMENT KIDNEY STONE/ ESWL Left 1/3/2017    Procedure: LITHROTRIPSY EXTRACORPORAL SHOCKWAVE (ESWL); Surgeon: Sasha Mendoza MD;  [facility];  Service: Urology    MA FRAGMENT KIDNEY STONE/ ESWL Right 11/18/2016    Procedure: Kathlen Litten SHOCKWAVE (ESWL); Surgeon: Sasha Mendoza MD;  [facility];  Service: Urology    URETERAL STENT PLACEMENT Bilateral 11/9/2016    Procedure: Roxanne Huang; STENT INSERTION ;  Surgeon: Sasha Mendoza MD;  [facility];  Service:          Subjective:  A bit more alert and verbal today than she has been  Kept eyes closed  Continues w/ mouth open posture, drying out mouth  Objective:  keofed in, see for po tolerance  Mouth open posture  Mouth very dry w/ some crust noted  Needed to apply moisturizer to mouth first as I was unable to remove the dried material   oral care completed   Pt also then required suction of loosened material  trialed min amount of honeythick liquid by tsp  Min transfer and swallow delay, weak laryngeal rise, eventual cough  Suctioned and trials d/c;d   Assessment:  A bit more alert but not appropriate for diet yet  Plan/Recommendations:  continue npo w/ keofed  Will f/u

## 2018-07-23 NOTE — PROGRESS NOTES
[facility] Medicine Progress Note  Patient: Ander Pyle 80 y o  female   MRN: [id number]  PCP: Kash Mcneal MD  [facility] Encounter: [id number]  Date Of Visit: 07/23/18    Assessment:    Principal Problem:    Status epilepticus ([facility] [address] )  Active Problems:    Hypertension    Weakness    Ambulatory dysfunction    Tremor    Thyroid nodule    Hyperchloremia    Encephalopathy with underlying dementia    Hypernatremia      Plan:    Patient was admitted on 07/12 from home due to generalized weakness and ambulatory dysfunction found to have UTI, then she developed left lower extremity tremor and focal status epilepticus  On 07/18 she was transferred to ICU due to toxic metabolic encephalopathy likely secondary to AEDs  1   Focal status epilepticus/ LLE tremor, managed by Neurology, off vEEG, currently on Vimpat, Dilantin and Depakote, follow on therapeutic levels,  awaiting brain MRI  2  Encephalopathy multifactorial secondary to ongoing seizure, AEDs and delirium, likely superimposed on cognitive impairment,  continue Seroquel q h s   3   Dysphagia secondary to above, currently on Keofed tube feeding, speech is following  4  Hypernatremia, improving, continue hypotonic IV fluid at lower rate, free water to tube feeding, monitor  5   E coli UTI, resolved , completed 5 day course of antibiotics  6  Generalized weakness and Ambulatory dysfunction, PT recommending STR   7  Left thyroid nodule, normal TSH,  outpatient thyroid ultrasound  8  Labile BP, monitor  9  Hyperlipidemia  10  Hypokalemia replace       VTE Pharmacologic Prophylaxis:   Pharmacologic: Heparin  Mechanical VTE Prophylaxis in Place: Yes    Patient Centered Rounds: I have performed bedside rounds with nursing staff today  Discussions with Specialists or Other Care Team Provider:     Education and Discussions with Family / Patient:  Patient     Time Spent for Care: 30 minutes    More than 50% of total time spent on counseling and coordination of care as described above  Current Length of Stay: 11 day(s)    Current Patient Status: Inpatient   Certification Statement: The patient will continue to require additional inpatient hospital stay due to Management focal status epilepticus    Discharge Plan / Estimated Discharge Date: not ready yet    Code Status: Level 3 - DNAR and DNI      Subjective:   Patient seen and examined  Continue to be drowsy, encephalopathic  Off EEG  No event overnight        Objective:     Vitals:   Temp (24hrs), Av °F (36 7 °C), Min:97 8 °F (36 6 °C), Max:98 4 °F (36 9 °C)    HR:  [70-76] 70  Resp:  [18] 18  BP: (130-140)/(52-63) 130/52  SpO2:  [97 %-98 %] 97 %  Body mass index is 27 46 kg/m²  Input and Output Summary (last 24 hours): Intake/Output Summary (Last 24 hours) at 18 1115  Last data filed at 18 0948   Gross per 24 hour   Intake                0 ml   Output              994 ml   Net             -994 ml       Physical Exam:     Physical Exam  Patient is somnolent, arousable to verbal stimuli,  in no acute distress  Lung clear to auscultation bilateral anteriorly  Heart positive S1-S2 no murmur  Abdomen soft  positive bowel sounds  Lower extremities with trace edema    Additional Data:     Labs:      Results from last 7 days  Lab Units 18  0553   WBC Thousand/uL 6 77   HEMOGLOBIN g/dL 13 2   HEMATOCRIT % 42 3   PLATELETS Thousands/uL 168   NEUTROS PCT % 68   LYMPHS PCT % 20   MONOS PCT % 8   EOS PCT % 4       Results from last 7 days  Lab Units 18  0531   SODIUM mmol/L 145   POTASSIUM mmol/L 3 3*   CHLORIDE mmol/L 113*   CO2 mmol/L 25   BUN mg/dL 23   CREATININE mg/dL 0 64   CALCIUM mg/dL 7 4*   GLUCOSE RANDOM mg/dL 321*           * I Have Reviewed All Lab Data Listed Above  * Additional Pertinent Lab Tests Reviewed:  All Labs For Current Hospital Admission Reviewed    Imaging:    Imaging Reports Reviewed Today Include:   Imaging Personally Reviewed by Myself Includes: Recent Cultures (last 7 days):           Last 24 Hours Medication List:     Current Facility-Administered Medications:  acetaminophen 650 mg Per NG Tube Q6H PRN Eveline Anderson PA-C    bisacodyl 10 mg Rectal Daily Michelle Nelson DO    dextrose 5 % and sodium chloride 0 33 % 75 mL/hr Intravenous Continuous Elijah Obrien DO Last Rate: 75 mL/hr (07/23/18 0814)   heparin (porcine) 5,000 Units Subcutaneous Q8H [address] Sandy Higginbotham MD    lacosamide (VIMPAT) IVPB 150 mg Intravenous Q12H Joann Valenzuela MD Last Rate: 150 mg (07/23/18 1020)   melatonin 3 mg Oral HS Sivakumar Horne MD    ondansetron 4 mg Intravenous Q6H PRN Sandy Panchal MD    phenytoin 100 mg Intravenous Q8H Jennifer Escalante DO    QUEtiapine 25 mg Oral HS Rachael Montes PA-C    senna 8 8 mg Per NG Tube HS Rachael Montes PA-C    valproate sodium 500 mg Intravenous Q8H [address] Larissa Lopes PA-C Last Rate: 500 mg (07/23/18 0515)        Today, Patient Was Seen By: Elijah Obrien DO    ** Please Note: This note has been constructed using a voice recognition system   **

## 2018-07-24 ENCOUNTER — APPOINTMENT (INPATIENT)
Dept: RADIOLOGY | Facility: HOSPITAL | Age: 83
DRG: 682 | End: 2018-07-24
Payer: MEDICARE

## 2018-07-24 LAB
ANION GAP SERPL CALCULATED.3IONS-SCNC: 6 MMOL/L (ref 4–13)
BUN SERPL-MCNC: 19 MG/DL (ref 5–25)
CALCIUM SERPL-MCNC: 8.5 MG/DL (ref 8.3–10.1)
CHLORIDE SERPL-SCNC: 110 MMOL/L (ref 100–108)
CO2 SERPL-SCNC: 27 MMOL/L (ref 21–32)
CREAT SERPL-MCNC: 0.71 MG/DL (ref 0.6–1.3)
GFR SERPL CREATININE-BSD FRML MDRD: 76 ML/MIN/1.73SQ M
GLUCOSE SERPL-MCNC: 135 MG/DL (ref 65–140)
GLUCOSE SERPL-MCNC: 143 MG/DL (ref 65–140)
GLUCOSE SERPL-MCNC: 216 MG/DL (ref 65–140)
MAGNESIUM SERPL-MCNC: 2.3 MG/DL (ref 1.6–2.6)
PHENYTOIN FREE SERPL-MCNC: 2 UG/ML (ref 1–2)
POTASSIUM SERPL-SCNC: 4.4 MMOL/L (ref 3.5–5.3)
SODIUM SERPL-SCNC: 143 MMOL/L (ref 136–145)
VALPROATE FREE SERPL-MCNC: 24 UG/ML (ref 6–22)

## 2018-07-24 PROCEDURE — 82948 REAGENT STRIP/BLOOD GLUCOSE: CPT

## 2018-07-24 PROCEDURE — 74018 RADEX ABDOMEN 1 VIEW: CPT

## 2018-07-24 PROCEDURE — 80048 BASIC METABOLIC PNL TOTAL CA: CPT | Performed by: INTERNAL MEDICINE

## 2018-07-24 PROCEDURE — 99233 SBSQ HOSP IP/OBS HIGH 50: CPT | Performed by: PHYSICIAN ASSISTANT

## 2018-07-24 PROCEDURE — 71045 X-RAY EXAM CHEST 1 VIEW: CPT

## 2018-07-24 PROCEDURE — 92526 ORAL FUNCTION THERAPY: CPT

## 2018-07-24 PROCEDURE — 83735 ASSAY OF MAGNESIUM: CPT | Performed by: INTERNAL MEDICINE

## 2018-07-24 PROCEDURE — 99222 1ST HOSP IP/OBS MODERATE 55: CPT | Performed by: FAMILY MEDICINE

## 2018-07-24 PROCEDURE — 99233 SBSQ HOSP IP/OBS HIGH 50: CPT | Performed by: INTERNAL MEDICINE

## 2018-07-24 RX ORDER — ACETAMINOPHEN 160 MG/5ML
650 SUSPENSION, ORAL (FINAL DOSE FORM) ORAL ONCE
Status: DISCONTINUED | OUTPATIENT
Start: 2018-07-24 | End: 2018-07-29 | Stop reason: HOSPADM

## 2018-07-24 RX ADMIN — HEPARIN SODIUM 5000 UNITS: 5000 INJECTION, SOLUTION INTRAVENOUS; SUBCUTANEOUS at 05:45

## 2018-07-24 RX ADMIN — HEPARIN SODIUM 5000 UNITS: 5000 INJECTION, SOLUTION INTRAVENOUS; SUBCUTANEOUS at 22:03

## 2018-07-24 RX ADMIN — VALPROATE SODIUM 500 MG: 100 INJECTION, SOLUTION INTRAVENOUS at 05:36

## 2018-07-24 RX ADMIN — VALPROATE SODIUM 500 MG: 100 INJECTION, SOLUTION INTRAVENOUS at 13:54

## 2018-07-24 RX ADMIN — VALPROATE SODIUM 500 MG: 100 INJECTION, SOLUTION INTRAVENOUS at 21:59

## 2018-07-24 RX ADMIN — HEPARIN SODIUM 5000 UNITS: 5000 INJECTION, SOLUTION INTRAVENOUS; SUBCUTANEOUS at 13:54

## 2018-07-24 RX ADMIN — BISACODYL 10 MG: 10 SUPPOSITORY RECTAL at 10:48

## 2018-07-24 RX ADMIN — SODIUM CHLORIDE 150 MG: 9 INJECTION, SOLUTION INTRAVENOUS at 23:22

## 2018-07-24 RX ADMIN — PHENYTOIN SODIUM 100 MG: 50 INJECTION INTRAMUSCULAR; INTRAVENOUS at 05:39

## 2018-07-24 RX ADMIN — SODIUM CHLORIDE 150 MG: 9 INJECTION, SOLUTION INTRAVENOUS at 10:44

## 2018-07-24 RX ADMIN — Medication 8.8 MG: at 21:57

## 2018-07-24 RX ADMIN — PHENYTOIN SODIUM 100 MG: 50 INJECTION INTRAMUSCULAR; INTRAVENOUS at 22:04

## 2018-07-24 RX ADMIN — DEXTROSE AND SODIUM CHLORIDE 40 ML/HR: 5; .33 INJECTION, SOLUTION INTRAVENOUS at 10:49

## 2018-07-24 RX ADMIN — PHENYTOIN SODIUM 100 MG: 50 INJECTION INTRAMUSCULAR; INTRAVENOUS at 15:24

## 2018-07-24 NOTE — SPEECH THERAPY NOTE
Speech Language/Pathology    Speech/Language Pathology Progress Note    Patient Name: Karel Del Valle  IMFCG'F Date: 7/24/2018     Problem List  Patient Active Problem List   Diagnosis    Nephrolithiasis    Hypertension    Hyperbilirubinemia    Hyperlipidemia    Amaurosis fugax    Hearing loss    Subarachnoid hemorrhage (HCC)    Tinea pedis    Vitamin D deficiency    Subdural hematoma (HCC)    Lumbar disc disease    Fall    Bilateral shoulder pain    Peripheral edema    Weakness    Ambulatory dysfunction    Thyroid nodule    Status epilepticus (HCC)    Hyperchloremia    Encephalopathy with underlying dementia    Hypernatremia    Hypokalemia        Past Medical History  Past Medical History:   Diagnosis Date    Amaurosis fugax 11/18/2016    Arthritis     History of subarachnoid hemorrhage     Hyperlipidemia     Hypertension     Nephrolithiasis     Thyroid nodule 7/17/2018        Past Surgical History  Past Surgical History:   Procedure Laterality Date    LITHOTRIPSY      MD FRAGMENT KIDNEY STONE/ ESWL Left 1/3/2017    Procedure: LITHROTRIPSY EXTRACORPORAL SHOCKWAVE (ESWL); Surgeon: Janice Reynoso MD;  [facility];  Service: Urology    MD FRAGMENT KIDNEY STONE/ ESWL Right 11/18/2016    Procedure: Colletta No SHOCKWAVE (ESWL); Surgeon: Janice Reynoso MD;  [facility];  Service: Urology    URETERAL STENT PLACEMENT Bilateral 11/9/2016    Procedure: Alexander Hernandez; STENT INSERTION ;  Surgeon: Janice Reynoso MD;  [facility];  Service:          Subjective:  Pt lethargic  keofed was blocked and taken out  Another to be put in    Objective:  Seen for po potential  Less verbal/interactive than yesterday  Not closing mouth upon request but states "i am"  Mouth dry due to almost constant mouth open posture  Oral care completed, gags easily  Mouth moisturizer applied  trialed min amt honey thick x 2  Not transferring  Suctioned     Assessment:  Remains inappropriate for po  Plan/Recommendations: Alternate nutrition, will follow

## 2018-07-24 NOTE — CASE MANAGEMENT
Continued Stay Review    Date: 7-24-18      Vital Signs: BP (!) 100/44 (BP Location: Left arm)   Pulse 80   Temp 99 5 °F (37 5 °C) (Axillary)   Resp 18   Ht 5' 2" (1 575 m)   Wt 69 8 kg (153 lb 14 1 oz)   SpO2 95%   BMI 28 15 kg/m²     Medications:   Scheduled Meds:   Current Facility-Administered Medications:  acetaminophen 650 mg Per NG Tube Q6H PRN   acetaminophen 650 mg Oral Once   acetaminophen 975 mg Oral Q8H FRANCIS   bisacodyl 10 mg Rectal Daily   dextrose 5 % and sodium chloride 0 33 % 40 mL/hr Intravenous Continuous   heparin (porcine) 5,000 Units Subcutaneous Q8H FRANCIS   lacosamide (VIMPAT) IVPB 150 mg Intravenous Q12H   melatonin 3 mg Oral HS   phenytoin 100 mg Intravenous Q8H   senna 8 8 mg Per NG Tube HS   valproate sodium 500 mg Intravenous Q8H [address]           Age/Sex: 80 y o  female     Assessment/Plan:    CONSULT GERIATRICS  1  Toxic metabolic encephalopathy/delirium- multifactorial in the setting of polypharmacy, status epilepticus, hypernatremia, UTI  Currently hypoactive, has been on seroquel QHS- will d/c seroquel  QTc 406 when last checked; if patient has agitated behaviors not controlled by nonpharmacologic measures, could consider zyprexa 2 5mg IM Q8h PRN or haldol 0 5-1mg IM Q6h PRN  Agree with scheduled acetaminophen for pain control, as untreated pain likely contributes to delirium  Agree with additional workup as per neurology  Ensure adequate nutrition and hydration  Underlying cognitive impairment cannot be ruled out at this time; has been evaluated by neurology in the past with concern for possible amyloid angiopathy  Recommend formal cognitive assessment following resolution of delirium  Continued supportive care and management of acute and chronic medical conditions       Delirium precautions/treatment: Maintain normal sleep-wake cycle, allowing for minimally interrupted sleep at night- lights out; keep awake/alert/interactive during the day- lights on/daylight   Ensure adequate nutrition and hydration  Frequent reorientation and redirection  Provide sensory aids at bedside and encourage use  Avoid use of deliriogenic medications, including benadryl, tramadol, benzodiazepines, sleep aids such as ambien or temazepam  Ensure pain is adequately controlled  Monitor for urinary retention and constipation with treatment/prophylaxis as indicated  Remove any unnecessary invasive lines or devices, such as bills catheters, central lines, peripheral monitoring devices (telemetry, continuous pulse ox), venodynes if on chemical DVT prophylaxis       2  Polypharmacy- medication reviewed, see outline in HPI  D/c seroquel as above  D/c PRN zofran, as patient not using  Multiple medications likely contributing to #1       3  Dysphagia- patient unsafe for PO intake in setting of #1  Agree with continued keofed and tube feeds at this time  Speech following, aspiration precautions       4  Epilepsia partialis continua- management per neurology     5  Ambulatory dysfunction with history of falls- PT/OT when appropriate  Fall precautions  Recommend addition of Vitamin D3 1000 IU PO daily      6  Deconditioning- supportive care, nutritional support     7  Hypernatremia- resolved; in the setting of NPO/poor PO intake  Monitor closely   Management per primary team          Discharge Plan:  REFERRED TO  Shannan Rodriguez

## 2018-07-24 NOTE — PROGRESS NOTES
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 SAHs hypertension, hyperlipidemia,  who present to the hospital on 07/12/2018 secondary to complaints of generalized weakness with inability to get out of her chair which started the day prior  Patient was found to have UTI and is currently being treated for this  Patient developed left upper extremity shaking which resolved, followed by a left lower extremity shaking 2 days later  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  AED regimen has been adjusted  No further clinical seizures noted  vEEG was discontinued  Patient remains somnolent  Plan:  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  - LLE jerking has resolved  No further clinical signs of seizures  - vEEG has been discontinued  - CT head negative for acute abnormality  - Repeat stat CTH negative for acute abnormality  - MRI brain with and without contrast was severe degraded by patient motion  No significant acute intracranial abnormality noted  Sequelae of prior subarachnoid hemorrhage noted with extensive hemosiderin staining  It may be reasonable for the patient to have a repeat MRI a brain down the road, especially due to her history of SAHs, when she is able to tolerate it better    I will leave this up to outpatient epileptologist   - Continue current AED regimen:   - IV Lacosamide (Vimpat) 150mg q 12 hours   - IV Depacon 500mg q 8 hours - 7/21 total level 57 - repeat trough level tomorrow morning prior to AM dose   - IV Phenytoin (Dilantin) 100mg q 8 hours - 7/21 total level: 11 9 (however 20 5 if adjusted for low albumin, as phenytoin is protein bound) - repeat trough level tomorrow morning prior to AM dose  Depending on level and patient's mentation we may adjust this dose tomorrow  - Tele  - Seizure precautions  - The patient will ultimately follow-up with outpatient epileptology  Communication has been sent to the office to schedule a follow-up appointment  Encephalopathy  Toxic metabolic secondary to recent UTI/AED medication effect/sleep-wake cycle disturbance /stress from recent seizure/postictal effect superimposed on reduce cognitive reserve/likely cognitive impairment  Patient's somnolence and encephalopathy seems to fluctuate  Continue serial exams  Spoke with Dr Kimmy Rolon, epileptologist regarding patient's current AED regimen  Would recommended discontinuing Seroquel and then repeating exam tomorrow  If patient continues to be very somnolent and encephalopathic, we will consider decreasing Phenytoin dose  Primary team to place Geriatrics consult  Recommend discontinuing Seroquel  Encourage good sleep/wake cycle  Encouraged out of bed to recliner if possible  Frequent neurological checks  Stat CT head with acute decline of in exam/mental status  Notify neurology with any changes in examination       Dysphagia  As patient's somnolence and encephalopathy improve, would recommend repeat swallow exam   For an outpatient with Keophed    Malnutrition  Albumin 2 4  Jevity    Ecoli UTI  Treatment completed     Generalized weakness and ambulatory dysfunction  OT/PT  Recommending STR    Subjective:   Alycia Goldberg is a 80 y o  left handed female with history of amaurosis fugax in 11/2016, hypertension, hyperlipidemia, traumatic SAH in 2/2018 after falling when shoveling snow, history of ?spontaneous SAHs who presented to the hospital on 07/12/2018 secondary to complaints of generalized weakness with inability to get out of her chair   CT head was negative for acute abnormality   A small focus of encephalomalacia at the left frontoparietal junction, the sequelae of prior hemorrhage, was noted  UA noted to be positive  Patient also with c/o LLQ discomfort  CT A/P revealed diverticula and nonobstructive nephrolithiasis  Antibiotics were started  Patient developed left upper extremity shaking which resolved, followed by a left lower extremity shaking 2 days later  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  Multiple AEDs trialed  Patient is currently on Vimpat, Dilantin and Depakote  No further clinical evidence of seizures  vEEG was discontinued  Patient continues to be somnolent  Today on exam, the patient is asleep and supine in bed  She is no longer in restraints  She appears comfortable  She awakes to her name  She is oriented to person and place and month though perseverates when asked further questions  She is able to follow commands  She does admit to pain in her left breast as well as bilateral shoulders  When asked about location of pain again she states everywhere "     Past Medical History:   Diagnosis Date    Amaurosis fugax 11/18/2016    Arthritis     History of subarachnoid hemorrhage     Hyperlipidemia     Hypertension     Nephrolithiasis     Thyroid nodule 7/17/2018     Past Surgical History:   Procedure Laterality Date    LITHOTRIPSY      [place] FRAGMENT KIDNEY STONE/ ESWL Left 1/3/2017    Procedure: LITHROTRIPSY EXTRACORPORAL SHOCKWAVE (ESWL); Surgeon: Leander Rob MD;  Location: [facility];  Service: Urology    [place] FRAGMENT KIDNEY STONE/ ESWL Right 11/18/2016    Procedure: Serjio Cabrera SHOCKWAVE (ESWL);   Surgeon: Leander Rob MD;  [facility];  Service: Urology    URETERAL STENT PLACEMENT Bilateral 11/9/2016    Procedure: Doris Bueno; STENT INSERTION ;  Surgeon: Leander Rob MD;  [facility];  Service:      Family history:  Family History   Problem Relation Age of Onset    Pneumonia Mother     Cancer Sister     Prostate cancer Brother        Social History     Social History    Marital status:      Spouse name: N/A    Number of children: N/A    Years of education: N/A     Social History Main Topics    Smoking status: Never Smoker    Smokeless tobacco: Never Used    Alcohol use No    Drug use: No    Sexual activity: Not Asked     Other Topics Concern    None     Social History Narrative    None       Scheduled Meds:    Current Facility-Administered Medications:  acetaminophen 650 mg Per NG Tube Q6H PRN Rachael Montes PA-C    acetaminophen 650 mg Oral Once Dharakenn Chance PA-C    acetaminophen 975 mg Oral Q8H [address] Kristen Rash, DO    bisacodyl 10 mg Rectal Daily Saira Tiwari,     dextrose 5 % and sodium chloride 0 33 % 40 mL/hr Intravenous Continuous Kristen Rash, DO Last Rate: 40 mL/hr (07/23/18 1127)   heparin (porcine) 5,000 Units Subcutaneous Q8H [address] Sandy Higginbotham MD    lacosamide (VIMPAT) IVPB 150 mg Intravenous Q12H Nelly Ortega MD Last Rate: Stopped (07/23/18 2333)   melatonin 3 mg Oral HS Alexis Marvin MD    ondansetron 4 mg Intravenous Q6H PRN Sandy Aden MD    phenytoin 100 mg Intravenous Q8H Jenniferjaney Escalante DO    QUEtiapine 25 mg Oral HS Rachael Montes PA-C    senna 8 8 mg Per NG Tube HS Rachael Montes PA-C    valproate sodium 500 mg Intravenous Q8H [address] Larissa Lopes PA-C Last Rate: 500 mg (07/24/18 0536)     Continuous Infusions:    dextrose 5 % and sodium chloride 0 33 % 40 mL/hr Last Rate: 40 mL/hr (07/23/18 1127)     PRN Meds:   acetaminophen    ondansetron    ROS: Unable to be obtained ( other than complaints of left breast and bilateral shoulder pain) due to AMS  Vitals: BP (!) 100/44 (BP Location: Left arm)   Pulse 80   Temp 99 5 °F (37 5 °C) (Axillary)   Resp 18   Ht 5' 2" (1 575 m)   Wt 69 8 kg (153 lb 14 1 oz)   SpO2 95%   BMI 28 15 kg/m²     Physical Exam:  Physical Exam   Constitutional: She appears well-developed and well-nourished  No distress     Elderly female, supine in bed, sleeping upon arrival   HENT:   Head: Normocephalic and atraumatic  Eyes: Conjunctivae are normal  Pupils are equal, round, and reactive to light  Right eye exhibits no discharge  Left eye exhibits no discharge  No scleral icterus  Patient does not track examiner when asked   Neck: Normal range of motion  Neck supple  Cardiovascular: Normal rate and regular rhythm  Exam reveals no gallop and no friction rub  No murmur heard  Pulmonary/Chest: Effort normal and breath sounds normal  No stridor  No respiratory distress  She has no wheezes  She has no rales  She exhibits no tenderness  Musculoskeletal: She exhibits edema (Mild bilateral upper extremity) and tenderness ( with active and passive range of motion of extremities x4)  She exhibits no deformity  Lymphadenopathy:     She has no cervical adenopathy  Neurological:   Drowsy but able to maintain and alert state during my exam   Skin: Skin is warm and dry  No rash noted  No erythema  Mild ecchymosis on arms bilaterally as well as some edema   Psychiatric:   Somnolent     Neurologic Exam     Mental Status   Somnolent on exam   Patient awakes to her name  She requires constant stimulation to maintain in awake state and even with this occasionally falls asleep  She is oriented to person and hospital as well as the [facility]  When asked the month she states July  When asked the year and the name of the president she continues to perseverate on July  Patient continues to require increased effort to speak though no brook dysarthria or aphasia noted  Cranial Nerves     CN II   Visual acuity: normal (Grossly)    CN III, IV, VI   Pupils are equal, round, and reactive to light  Conjugate gaze: present    CN VII   Facial expression full, symmetric  Does not track examiner when asked     Motor Exam   Muscle bulk: decreased  Overall strength continues to decline    Patient able to lift bilateral upper extremities antigravity for 5 seconds with increased effort  I am able to range her passively to approximately 115° bilaterally without pain, which is improved from prior exams    RUE:  Full  strength  Proximal grossly 3-    LUE:   strength 4+  proximal grossly 3-    RLE:  Able to wiggle toes and dorsi/plantar flex against gravity, otherwise grossly 2+    LLE:  Minimal toe wiggling, Trace movement in remaining extremity     Sensory Exam   Unable to complete sensory exam due to patient's somnolence       Labs:  Recent Results (from the past 24 hour(s))   Fingerstick Glucose (POCT)    Collection Time: 07/24/18  3:30 AM   Result Value Ref Range    POC Glucose 143 (H) 65 - 140 mg/dl   Basic metabolic panel    Collection Time: 07/24/18  4:54 AM   Result Value Ref Range    Sodium 143 136 - 145 mmol/L    Potassium 4 4 3 5 - 5 3 mmol/L    Chloride 110 (H) 100 - 108 mmol/L    CO2 27 21 - 32 mmol/L    Anion Gap 6 4 - 13 mmol/L    BUN 19 5 - 25 mg/dL    Creatinine 0 71 0 60 - 1 30 mg/dL    Glucose 216 (H) 65 - 140 mg/dL    Calcium 8 5 8 3 - 10 1 mg/dL    eGFR 76 ml/min/1 73sq m   Magnesium    Collection Time: 07/24/18  4:54 AM   Result Value Ref Range    Magnesium 2 3 1 6 - 2 6 mg/dL   Fingerstick Glucose (POCT)    Collection Time: 07/24/18  6:41 AM   Result Value Ref Range    POC Glucose 135 65 - 140 mg/dl     Imaging: I have personally reviewed pertinent imaging and PACS reports       VTE Prophylaxis: Heparin

## 2018-07-24 NOTE — PROGRESS NOTES
As per the final chest x-ray reading, the keofeed tube terminates in the midesophagus and repositioning is recommended  The patient's tube feeding was put on hold  Dr Rickie Senior was made aware of these findings  A nurse from [facility] came up and repositioned the patient's Keofeed tube and a chest x-ray was completed  Will continue to hold the tube feedings and monitor the patient until a final chest x-ray reading is resulted

## 2018-07-24 NOTE — CONSULTS
Consultation - Geriatric Medicine   Lexx Mcknight 80 y o  female MRN: [id number]  Unit/Bed#: [facility] 732-01 Encounter: [id number]      Assessment/Plan   80year old female with:    1  Toxic metabolic encephalopathy/delirium- multifactorial in the setting of polypharmacy, status epilepticus, hypernatremia, UTI  Currently hypoactive, has been on seroquel QHS- will d/c seroquel  QTc 406 when last checked; if patient has agitated behaviors not controlled by nonpharmacologic measures, could consider zyprexa 2 5mg IM Q8h PRN or haldol 0 5-1mg IM Q6h PRN  Agree with scheduled acetaminophen for pain control, as untreated pain likely contributes to delirium  Agree with additional workup as per neurology  Ensure adequate nutrition and hydration  Underlying cognitive impairment cannot be ruled out at this time; has been evaluated by neurology in the past with concern for possible amyloid angiopathy  Recommend formal cognitive assessment following resolution of delirium  Continued supportive care and management of acute and chronic medical conditions  Delirium precautions/treatment: Maintain normal sleep-wake cycle, allowing for minimally interrupted sleep at night- lights out; keep awake/alert/interactive during the day- lights on/daylight  Ensure adequate nutrition and hydration  Frequent reorientation and redirection  Provide sensory aids at bedside and encourage use  Avoid use of deliriogenic medications, including benadryl, tramadol, benzodiazepines, sleep aids such as ambien or temazepam  Ensure pain is adequately controlled  Monitor for urinary retention and constipation with treatment/prophylaxis as indicated  Remove any unnecessary invasive lines or devices, such as bills catheters, central lines, peripheral monitoring devices (telemetry, continuous pulse ox), venodynes if on chemical DVT prophylaxis  2  Polypharmacy- medication reviewed, see outline in HPI  D/c seroquel as above   D/c PRN zofran, as patient not using  Multiple medications likely contributing to #1  3  Dysphagia- patient unsafe for PO intake in setting of #1  Agree with continued keofed and tube feeds at this time  Speech following, aspiration precautions  4  Epilepsia partialis continua- management per neurology    5  Ambulatory dysfunction with history of falls- PT/OT when appropriate  Fall precautions  Recommend addition of Vitamin D3 1000 IU PO daily     6  Deconditioning- supportive care, nutritional support    7  Hypernatremia- resolved; in the setting of NPO/poor PO intake  Monitor closely  Management per primary team      Thank you for this consultation and allowing us to participate in the care of this patient  We will continue to follow  History of Present Illness   Physician Requesting Consult: Oscar Gant MD  Reason for Consult / Principal Problem: Delirium  Hx and PE limited by: Delirium/encephalopathy; history obtained primarily from record review  HPI: Jimena Powers is a 80y o  year old female who presents with generalized weakness; admitted to medical service on 7/12/18  Initial workup at that time included CT head showing sequelae of previous [facility]; started on ceftriaxone and transitioned to keflex for Ecoli UTI  Patient noted to have persistent tremor, for which she was evaluated by neurology; workup including a cervical and thoracic CT, without cord involvement- patient was started on robaxin valles LLE tremor and pain  Patient also placed on continuous EEG which noted focal status epilpeticus; anti-epileptic regimen started and adjusted various times per neurology; patient also required ICU level monitoring from approximately 7/18-7/21  Please see full list of medications and dosing as outlined below  Geriatric medicine is consulted for further evaluation in the setting of encephalopathy/delirium       Medication History:  -Acetaminophen 975mg Q8h started 7/23 for pain- received one dose  -Carbamazepine on 7/17  -Depakote on 7/20  -Fosphenytoin on 7/18  -Vimpat started on 7/17 through present   -[address] on 7/17, 7/18  -Lorazepam on 7/16, 7/17  -Melatonin 3mg QHS started 7/19 through present  -Robaxin 750mg QHS on 7/16  -Zyprexa 2 5mg QHS on 7/19  -Phenytoin on 7/18 through present  -Seroquel 25mg QHS 7/20 through present  -Valproic acid 7/19 through present    Valproic acid and phenytoin levels ordered per neurology  TSH, folate, B12 checked and unremarkable  NH4 level low  No constipation noted  Patient NPO and has keofed tube in place with tube feeds running  At baseline, patient is independent with ADLs, with recent increased need for ADL assist  Receives IADL assistance  Ambulated with the assistance of a roller walker  Lives with grandson  Has had falls in the past 6 months  No noted diagnosis of dementia or cognitive impairment per record review  Consults    Review of Systems   Unable to perform ROS: Mental status change   Neurological: Positive for seizures  Psychiatric/Behavioral: Positive for confusion  Historical Information   Past Medical History:   Diagnosis Date    Amaurosis fugax 11/18/2016    Arthritis     History of subarachnoid hemorrhage     Hyperlipidemia     Hypertension     Nephrolithiasis     Thyroid nodule 7/17/2018     Past Surgical History:   Procedure Laterality Date    LITHOTRIPSY      [place] FRAGMENT KIDNEY STONE/ ESWL Left 1/3/2017    Procedure: LITHROTRIPSY EXTRACORPORAL SHOCKWAVE (ESWL); Surgeon: Jaspal Rossi MD;  [facility];  Service: Urology    [place] FRAGMENT KIDNEY STONE/ ESWL Right 11/18/2016    Procedure: Kristal Ortiz SHOCKWAVE (ESWL);   Surgeon: Jaspal Rossi MD;  [facility];  Service: Urology    URETERAL STENT PLACEMENT Bilateral 11/9/2016    Procedure: Vinie Dancer; STENT INSERTION ;  Surgeon: Jaspal Rossi MD;  [facility];  Service:      Social History   History   Alcohol Use No     History   Drug Use No History   Smoking Status    Never Smoker   Smokeless Tobacco    Never Used     Family History:   Family History   Problem Relation Age of Onset    Pneumonia Mother     Cancer Sister     Prostate cancer Brother        Meds/Allergies   all current active meds have been reviewed, current meds:   Current Facility-Administered Medications   Medication Dose Route Frequency    acetaminophen (TYLENOL) oral suspension 650 mg  650 mg Per NG Tube Q6H PRN    acetaminophen (TYLENOL) oral suspension 650 mg  650 mg Oral Once    acetaminophen (TYLENOL) tablet 975 mg  975 mg Oral Q8H [facility]    bisacodyl (DULCOLAX) rectal suppository 10 mg  10 mg Rectal Daily    dextrose 5 % and sodium chloride 0 33 % infusion  40 mL/hr Intravenous Continuous    heparin (porcine) subcutaneous injection 5,000 Units  5,000 Units Subcutaneous Q8H [facility]    lacosamide (VIMPAT) 150 mg in sodium chloride 0 9 % 100 mL IVPB  150 mg Intravenous Q12H    melatonin tablet 3 mg  3 mg Oral HS    phenytoin (DILANTIN) injection 100 mg  100 mg Intravenous Q8H    senna 8 8 mg/5 mL oral syrup 8 8 mg  8 8 mg Per NG Tube HS    valproate (DEPACON) 500 mg in sodium chloride 0 9 % 50 mL IVPB  500 mg Intravenous Q8H [facility]    and PTA meds:   Prior to Admission Medications   Prescriptions Last Dose Informant Patient Reported? Taking? Multiple Vitamin (MULTIVITAMIN) tablet 7/12/2018 at Unknown time  Yes Yes   Sig: Take 1 tablet by mouth daily  acetaminophen (TYLENOL) 325 mg tablet Past Month at Unknown time  No Yes   Sig: Take 2 tablets by mouth every 6 (six) hours as needed for mild pain   Patient taking differently: Take 325 mg by mouth every 6 (six) hours as needed for mild pain     calcium-vitamin D (OSCAL 500 + D) 500 mg-200 units per tablet 7/12/2018 at Unknown time  Yes Yes   Sig: Take 1 tablet by mouth daily   1000 iu   furosemide (LASIX) 20 mg tablet 7/12/2018 at Unknown time  No Yes   Sig: Take 1 tablet (20 mg total) by mouth daily   magnesium hydroxide (MILK OF MAGNESIA) 400 mg/5 mL oral suspension  Outside Facility (Specify) Yes No   Sig: Take 30 mL by mouth daily as needed for constipation   polyethylene glycol (MIRALAX) 17 g packet   No No   Sig: Take 17 g by mouth daily for 7 days   potassium chloride (K-DUR) 10 mEq tablet 7/12/2018 at Unknown time  No Yes   Sig: Take 1 tablet (10 mEq total) by mouth daily   senna (SENOKOT) 8 6 mg  Outside Facility (Specify) No No   Sig: Take 1 tablet by mouth 2 (two) times a day for 7 days   Patient taking differently: Take 1 tablet by mouth daily     simvastatin (ZOCOR) 40 mg tablet 7/11/2018 at Unknown time  No Yes   Sig: Take 1 tablet (40 mg total) by mouth daily at bedtime      Facility-Administered Medications: None       No Known Allergies    Objective     Intake/Output Summary (Last 24 hours) at 07/24/18 1406  Last data filed at 07/24/18 1309   Gross per 24 hour   Intake             1200 ml   Output              290 ml   Net              910 ml     Invasive Devices     Peripherally Inserted Central Catheter Line            PICC Line 86/03/56 Right Basilic 4 days          Drain            NG/OG/Enteral Tube Nasogastric 8 Fr Left nares 3 days    NG/OG/Enteral Tube Enteral Feeding Tube Right nares less than 1 day                Physical Exam   Constitutional: She appears well-developed and well-nourished  She appears lethargic  No distress  HENT:   Head: Normocephalic and atraumatic  Right Ear: External ear normal    Left Ear: External ear normal    Mouth/Throat: Oropharynx is clear and moist  No oropharyngeal exudate (Small ecchymotic areas noted on lips )  Keofed tube in place    Eyes: Conjunctivae are normal  Right eye exhibits no discharge  Left eye exhibits no discharge  No scleral icterus  Neck: Normal range of motion  Neck supple  Cardiovascular: Normal rate, regular rhythm and normal heart sounds  Pulmonary/Chest: Effort normal and breath sounds normal  No respiratory distress  Abdominal: Soft  Bowel sounds are normal  She exhibits no distension  There is no tenderness  Neurological: She appears lethargic  She displays no tremor  Opens eyes to voice, does not follow commands   Skin: She is not diaphoretic  Psychiatric: She is slowed and withdrawn  She is not actively hallucinating  She is noncommunicative  She is inattentive  Nursing note and vitals reviewed      Vitals:    07/23/18 0652 07/23/18 1525 07/23/18 2347 07/24/18 0600   BP: 130/52 126/56 (!) 100/44    BP Location: Left arm Left arm Left arm    Pulse: 70 82 80    Resp: 18 18 18    Temp: 97 9 °F (36 6 °C) 98 6 °F (37 °C) 99 5 °F (37 5 °C)    TempSrc: Axillary Axillary Axillary    SpO2: 97% 98% 95%    Weight:    69 8 kg (153 lb 14 1 oz)   Height:           Lab Results:   Lab Results   Component Value Date    WBC 6 77 07/21/2018    WBC 5 46 10/30/2015    RBC 4 48 07/21/2018    RBC 4 80 10/30/2015    HGB 13 2 07/21/2018    HGB 14 6 10/30/2015    HCT 42 3 07/21/2018    HCT 45 0 10/30/2015    MCV 94 07/21/2018    MCV 94 10/30/2015     07/21/2018     10/30/2015     Lab Results   Component Value Date    KQBHUSJK95 288 07/16/2018     No results found for: TSH, G0FXHVS, FREET4  No components found for: VITAMIND1, 25-DIHYDROXY  Lab Results   Component Value Date     07/24/2018     10/30/2015    K 4 4 07/24/2018    K 4 8 10/30/2015     (H) 07/24/2018     10/30/2015    CO2 27 07/24/2018    CO2 32 10/30/2015    ANIONGAP 6 07/24/2018    ANIONGAP 5 10/30/2015    BUN 19 07/24/2018    BUN 21 10/30/2015    CREATININE 0 71 07/24/2018    CREATININE 1 09 10/30/2015    GLUCOSE 216 (H) 07/24/2018    GLUCOSE 99 10/30/2015    CALCIUM 8 5 07/24/2018    CALCIUM 9 4 10/30/2015    AST 17 07/12/2018    AST 16 10/30/2015    ALT 22 07/12/2018    ALT 24 10/30/2015    ALKPHOS 88 07/12/2018    ALKPHOS 111 10/30/2015    PROT 6 5 07/12/2018    PROT 6 6 10/30/2015    BILITOT 0 41 07/12/2018    BILITOT 0 72 10/30/2015    EGFR 76 07/24/2018 Lab Results   Component Value Date    COLORU Yellow 07/12/2018    COLORU Yellow 10/28/2014    CLARITYU Cloudy 07/12/2018    CLARITYU Clear 10/28/2014    SPECGRAV 1 015 07/12/2018    SPECGRAV 1 009 10/28/2014    PHUR 6 0 07/12/2018    PHUR 7 0 10/28/2014    LEUKOCYTESUR Large (A) 07/12/2018    LEUKOCYTESUR Large (A) 10/28/2014    NITRITE Positive (A) 07/12/2018    NITRITE Negative 10/28/2014    PROTEINUA 30 (1+) (A) 07/12/2018    PROTEINUA Negative 10/28/2014    GLUCOSEU Negative 07/12/2018    GLUCOSEU Negative 10/28/2014    KETONESU Negative 07/12/2018    KETONESU Negative 10/28/2014    BILIRUBINUR Negative 07/12/2018    BILIRUBINUR Negative 10/28/2014    BLOODU Moderate (A) 07/12/2018    BLOODU Small (A) 10/28/2014     No results found for: ALBUMIN, PREALBUMIN, LABPRE  Lab Results   Component Value Date    INR 1 07 07/12/2018     Lab Results   Component Value Date    BLOODCX No Growth After 5 Days   11/07/2016     Lab Results   Component Value Date    URINECX >100,000 cfu/ml Escherichia coli (A) 07/12/2018       Imaging Studies:   MRI brain on 7/23 reviewed; showing sequela from previous [facility] with hemosiderin staining    EKG, Pathology, and Other Studies:   EKG- QTc 406    Therapies:   PT: eval reviewed; recommending STR  OT: eval reviewed; recommending STR    VTE Prophylaxis: Heparin    Code Status: Level 3 - DNAR and DNI  Advance Directive and Living Will: Yes    Power of :    POLST:

## 2018-07-24 NOTE — PLAN OF CARE
SAFETY,RESTRAINT: NV/NON-SELF DESTRUCTIVE BEHAVIOR     Remains free of harm/injury (restraint for non violent/non self-detsructive behavior) Completed     Returns to optimal restraint-free functioning Completed          DISCHARGE PLANNING     Discharge to home or other facility with appropriate resources Progressing        DISCHARGE PLANNING - CARE MANAGEMENT     Discharge to post-acute care or home with appropriate resources Progressing        GENITOURINARY - ADULT     Maintains or returns to baseline urinary function Progressing     Absence of urinary retention Progressing        INFECTION - ADULT     Absence or prevention of progression during hospitalization Progressing        Knowledge Deficit     Patient/family/caregiver demonstrates understanding of disease process, treatment plan, medications, and discharge instructions Progressing        METABOLIC, FLUID AND ELECTROLYTES - ADULT     Electrolytes maintained within normal limits Progressing     Fluid balance maintained Progressing        NEUROSENSORY - ADULT     Achieves stable or improved neurological status Progressing     Absence of seizures Progressing     Remains free of injury related to seizures activity Progressing     Achieves maximal functionality and self care Progressing        Nutrition/Hydration-ADULT     Nutrient/Hydration intake appropriate for improving, restoring or maintaining nutritional needs Progressing        PAIN - ADULT     Verbalizes/displays adequate comfort level or baseline comfort level Progressing        Potential for Falls     Patient will remain free of falls Progressing        Prexisting or High Potential for Compromised Skin Integrity     Skin integrity is maintained or improved Progressing        SAFETY ADULT     Maintain or return to baseline ADL function Progressing     Maintain or return mobility status to optimal level Progressing     Patient will remain free of falls Progressing

## 2018-07-25 LAB
ANION GAP SERPL CALCULATED.3IONS-SCNC: 6 MMOL/L (ref 4–13)
BUN SERPL-MCNC: 15 MG/DL (ref 5–25)
CALCIUM SERPL-MCNC: 7.9 MG/DL (ref 8.3–10.1)
CHLORIDE SERPL-SCNC: 105 MMOL/L (ref 100–108)
CO2 SERPL-SCNC: 27 MMOL/L (ref 21–32)
CREAT SERPL-MCNC: 0.72 MG/DL (ref 0.6–1.3)
EST. AVERAGE GLUCOSE BLD GHB EST-MCNC: 126 MG/DL
GFR SERPL CREATININE-BSD FRML MDRD: 75 ML/MIN/1.73SQ M
GLUCOSE SERPL-MCNC: 315 MG/DL (ref 65–140)
HBA1C MFR BLD: 6 % (ref 4.2–6.3)
PHENYTOIN SERPL-MCNC: 12.9 UG/ML (ref 10–20)
POTASSIUM SERPL-SCNC: 4 MMOL/L (ref 3.5–5.3)
SODIUM SERPL-SCNC: 138 MMOL/L (ref 136–145)
VALPROATE SERPL-MCNC: 62 UG/ML (ref 50–100)

## 2018-07-25 PROCEDURE — 99232 SBSQ HOSP IP/OBS MODERATE 35: CPT | Performed by: PHYSICIAN ASSISTANT

## 2018-07-25 PROCEDURE — 99232 SBSQ HOSP IP/OBS MODERATE 35: CPT | Performed by: INTERNAL MEDICINE

## 2018-07-25 PROCEDURE — 83036 HEMOGLOBIN GLYCOSYLATED A1C: CPT | Performed by: INTERNAL MEDICINE

## 2018-07-25 PROCEDURE — 80164 ASSAY DIPROPYLACETIC ACD TOT: CPT | Performed by: PHYSICIAN ASSISTANT

## 2018-07-25 PROCEDURE — 80185 ASSAY OF PHENYTOIN TOTAL: CPT | Performed by: PHYSICIAN ASSISTANT

## 2018-07-25 PROCEDURE — 80048 BASIC METABOLIC PNL TOTAL CA: CPT | Performed by: INTERNAL MEDICINE

## 2018-07-25 PROCEDURE — 97164 PT RE-EVAL EST PLAN CARE: CPT

## 2018-07-25 PROCEDURE — 92526 ORAL FUNCTION THERAPY: CPT

## 2018-07-25 PROCEDURE — 99233 SBSQ HOSP IP/OBS HIGH 50: CPT | Performed by: PSYCHIATRY & NEUROLOGY

## 2018-07-25 RX ORDER — ACETAMINOPHEN 650 MG/1
325 SUPPOSITORY RECTAL EVERY 4 HOURS PRN
Status: DISCONTINUED | OUTPATIENT
Start: 2018-07-25 | End: 2018-07-26

## 2018-07-25 RX ADMIN — VALPROATE SODIUM 500 MG: 100 INJECTION, SOLUTION INTRAVENOUS at 05:40

## 2018-07-25 RX ADMIN — VALPROATE SODIUM 500 MG: 100 INJECTION, SOLUTION INTRAVENOUS at 16:01

## 2018-07-25 RX ADMIN — DEXTROSE AND SODIUM CHLORIDE 40 ML/HR: 5; .33 INJECTION, SOLUTION INTRAVENOUS at 12:51

## 2018-07-25 RX ADMIN — BISACODYL 10 MG: 10 SUPPOSITORY RECTAL at 08:46

## 2018-07-25 RX ADMIN — VALPROATE SODIUM 500 MG: 100 INJECTION, SOLUTION INTRAVENOUS at 22:49

## 2018-07-25 RX ADMIN — ACETAMINOPHEN 650 MG: 160 SUSPENSION ORAL at 23:44

## 2018-07-25 RX ADMIN — PHENYTOIN SODIUM 100 MG: 50 INJECTION INTRAMUSCULAR; INTRAVENOUS at 05:42

## 2018-07-25 RX ADMIN — HEPARIN SODIUM 5000 UNITS: 5000 INJECTION, SOLUTION INTRAVENOUS; SUBCUTANEOUS at 13:49

## 2018-07-25 RX ADMIN — HEPARIN SODIUM 5000 UNITS: 5000 INJECTION, SOLUTION INTRAVENOUS; SUBCUTANEOUS at 05:48

## 2018-07-25 RX ADMIN — Medication 8.8 MG: at 22:06

## 2018-07-25 RX ADMIN — HEPARIN SODIUM 5000 UNITS: 5000 INJECTION, SOLUTION INTRAVENOUS; SUBCUTANEOUS at 22:07

## 2018-07-25 RX ADMIN — SODIUM CHLORIDE 150 MG: 9 INJECTION, SOLUTION INTRAVENOUS at 22:06

## 2018-07-25 RX ADMIN — PHENYTOIN SODIUM 100 MG: 50 INJECTION INTRAMUSCULAR; INTRAVENOUS at 13:49

## 2018-07-25 RX ADMIN — SODIUM CHLORIDE 150 MG: 9 INJECTION, SOLUTION INTRAVENOUS at 09:32

## 2018-07-25 RX ADMIN — PHENYTOIN SODIUM 100 MG: 50 INJECTION INTRAMUSCULAR; INTRAVENOUS at 23:11

## 2018-07-25 NOTE — SPEECH THERAPY NOTE
Speech Language/Pathology    Speech/Language Pathology Progress Note    Patient Name: April Thomas  ASRPH'Z Date: 7/25/2018     Problem List  Patient Active Problem List   Diagnosis    Nephrolithiasis    Hypertension    Hyperbilirubinemia    Hyperlipidemia    Amaurosis fugax    Hearing loss    Subarachnoid hemorrhage (HCC)    Tinea pedis    Vitamin D deficiency    Subdural hematoma (HCC)    Lumbar disc disease    Fall    Bilateral shoulder pain    Peripheral edema    Weakness    Ambulatory dysfunction    Thyroid nodule    Status epilepticus (HCC)    Hyperchloremia    Encephalopathy with underlying dementia    Hypernatremia    Hypokalemia        Past Medical History  Past Medical History:   Diagnosis Date    Amaurosis fugax 11/18/2016    Arthritis     History of subarachnoid hemorrhage     Hyperlipidemia     Hypertension     Nephrolithiasis     Thyroid nodule 7/17/2018        Past Surgical History  Past Surgical History:   Procedure Laterality Date    LITHOTRIPSY      [place] FRAGMENT KIDNEY STONE/ ESWL Left 1/3/2017    Procedure: LITHROTRIPSY EXTRACORPORAL SHOCKWAVE (ESWL); Surgeon: Dillon Murray MD;  [facility];  Service: Urology    AK FRAGMENT KIDNEY STONE/ ESWL Right 11/18/2016    Procedure: Ava Rodrigezing SHOCKWAVE (ESWL); Surgeon: Dillon Murray MD;  [facility];  Service: Urology    URETERAL STENT PLACEMENT Bilateral 11/9/2016    Procedure: Thbarbaraell Livers; STENT INSERTION ;  Surgeon: Dillon Murray MD;  [facility];  Service:          Subjective:  Very lethargic  Objective:  keofed replaced  Pt less responsive today that yesterday  Oral care completed  Pt followed some basic commands (i e  "Stop biting me" when I applied mouth moisturizer w/ gloved finger)  Gags easily, otherwise made no attempt to speak and continued w/ open mouth posture  Assessment:  Not appropriate for po trials  Plan/Recommendations:  ? Prognosis/plan/palliative  keofed as needed

## 2018-07-25 NOTE — PROGRESS NOTES
Progress Note - Jessa Rick 80 y o  female MRN: [id number]    Unit/Bed#: [facility] 732-01 Encounter: [id number]      Assessment/Plan  1  Toxic metabolic encephalopathy/delirium  Seroquel discontinued  Patient continues to be lethargic  Spoke to slim about directing patient's care to a palliative approach, they report the Neurology team would like to give it a couple days and then reassess as patient was very functional PTA   administered in last 24 hours, last Seroquel dose was given on 07/23  Agree with neuro recs, there was concern for possible amyloid angiopathy previously  Continue supportive care  Ensure adequate nutrition and hydration  Continue with scheduled Tylenol  Last BM July 24  Redirect unwanted patient behaviors as first line tx  Reorient patient frequently  Avoid deliriogenic meds including tramadol, benzodiazepines, benadryl  Good sleep hygiene important, limit night time interruptions  Encourage patient to stay awake during the day  Ensure adequate hydration/nutrition  Mobilize often     2  Underlying cognitive impairment cannot be ruled out  Follow up with neuro as outpatient  Presentation currently complicated by 1  Recommend formal cognitive assessment following resolution of patient's delirium  Continue supportive care    3  Dysphagia  Complicated by 1  Patient has care fit at this time  Speech is following, saw the patient today, but was unable to assess as patient is lethargic  As stated above, brought up subjective palliative Care to [facility], they report neuro would like to wait a couple days and reassess  Would not recommend PEG tube    4  Ambulatory dysfunction  Also has history of falls  PT, OT if/when appropriate  Recommend adding vitamin D3 1000 international units daily    5  History of falls  Fall precautions  Home meds okay  PT, OT when appropriate    6    Deconditioning  Was previously functional prior to arrival  Has been in the hospital 13 days  Mobilize frequently once appropriate to prevent further decline  Suspect patient will need rehab following complicated hospital stay    7  Epilepsia partialis continua  Management per Neurology        Subjective:   Patient continues to be lethargic  Last Seroquel dose was given on 07/23  Spoke with [facility] about possibly pursuing a palliative care approach, jose armando reports neuro is following the patient and prefers to wait a couple days to see if patient improves  Pertinent review of systems cannot be gathered secondary to patient's mental status  Objective:     Vitals: Blood pressure (!) 120/43, pulse 87, temperature 97 5 °F (36 4 °C), temperature source Oral, resp  rate 18, height 5' 2" (1 575 m), weight 69 8 kg (153 lb 14 1 oz), SpO2 94 %, not currently breastfeeding  ,Body mass index is 28 15 kg/m²  Intake/Output Summary (Last 24 hours) at 07/25/18 1115  Last data filed at 07/25/18 0620   Gross per 24 hour   Intake              349 ml   Output             1586 ml   Net            -1237 ml       Physical Exam: General : frail, keofed in place, lethargic  HEENT : MMM no erythema or exudates  EOMI, sclera anicteric  Heart : Normal rate  Lungs : CTA  Abdomen : Soft, NT/ND, BS auscultated in all 4 quads  No organomegaly  Ext :  Edema to LE B/L  Skin : Pink, warm, dry, age appropriate turgor and mobility  Neuro : Nonfocal  Psych : lethargic, not alert or oriented  Invasive Devices     Peripherally Inserted Central Catheter Line            PICC Line 96/40/35 Right Basilic 4 days          Drain            NG/OG/Enteral Tube Nasogastric 8 Fr Left nares 4 days    NG/OG/Enteral Tube Enteral Feeding Tube Right nares less than 1 day                Lab, Imaging and other studies: I have personally reviewed pertinent reports      VTE Pharmacologic Prophylaxis: Sequential compression device (Venodyne)   VTE Mechanical Prophylaxis: sequential compression device

## 2018-07-25 NOTE — PROGRESS NOTES
Progress Note - Neurology   Leonard Kaplan 80 y o  female MRN: [id number]  Unit/Bed#: [facility] 732-01 Encounter: [id number]    Assessment/Plan:  1  Epilepsia partialis continua  -Originating from right sensory motor cortex  LLE jerking has resolved  No further clinical signs of seizures  vEEG has been d/c on 7/22  -CT head negative for acute intracranial abnormality  Repeat CT head on 7/18 also negative for acute intracranial abnormality  -MRI brain 7/23 severely degraded secondary to motion, but without acute intracranial abnormality  It shows quality of prior subarachnoid hemorrhage with extensive hemosiderin staining  It may be reasonable for the patient to obtain repeat MRI brain down the road when she is able to tolerate better  We will leave this up to outpatient epileptologist   -Continue current AED regimen:   -IV lacosamide 150 mg q12 hours   -IV Depacon 500 mg q8 hours  Valproic acid level this AM 62  -IV phenytoin 100 mg q8 hours  Phenytoin level this AM 12 9, which is 22 2 when calculated for hypoalbuminemia   -Seizure precautions  -Will need follow-up with epilepsy as outpatient  Appointment has been previously requested   -Continue to monitor and notify with changes    2   Encephalopathy  -Toxic metabolic secondary to recent UTI, AED medication effect, sleep-wake cycle disturbance, stress from recent seizure/postictal fact superimposed on reduce cognitive reserve/likely cognitive impairment   -Patient continues to be encephalopathic/drowsy and appears worse than yesterday   -Seroquel discontinued  -Encourage good sleep/wake cycle  -Continue supportive care per primary team  -Frequent neuro checks with STAT head CT with acute decline in exam/mental status  -If patient continues to be encephalopathic/somnolent tomorrow, will start to wean phenytoin  -Continue to monitor and notify with changes     Subjective:   Leonard Kaplan is a 80 y o  female with past medical history of amaurosis fugax in 11/2016, ICH and 06/2016, traumatic SAH in 02/2018 after falling when shoveling snow,history of ? spontaneous SAHs, hypertension, hyperlipidemia, who presented to the hospital on 07/12/2018 with complaints of generalized weakness with inability of being able to get out of the chair  She was found have a UTI, antibiotics since completed  Patient developed left upper extremity shaking, which has resolved, followed by left lower extremity shaking 2 days later  Continuous vEEG revealed right-sided focal motor seizures  AED regimen has been adjusted accordingly  vEEG has since been discontinued  Patient has remained somnolent  Today, patient is more encephalopathic than reported yesterday  Patient responding to name  Currently not answering any orienting questions  Not clearly following commands  Moving all extremities symmetrically  ROS:  Unable to obtain secondary to encephalopathy    Medications: All current active meds have been reviewed and current meds:  Scheduled Meds:  Current Facility-Administered Medications:  acetaminophen 650 mg Per NG Tube Q6H PRN Georgette Thomas PA-C    acetaminophen 650 mg Oral Once Margaret Jaeger PA-C    acetaminophen 975 mg Oral [facility] Ninfa Aleman DO    bisacodyl 10 mg Rectal Daily Maicol Lawson,     dextrose 5 % and sodium chloride 0 33 % 40 mL/hr Intravenous Continuous Ninfamayelin Aleman DO Last Rate: 40 mL/hr (07/25/18 1251)   heparin (porcine) 5,000 Units Subcutaneous Q8H [facility] Sandy Higginbotham MD    lacosamide (VIMPAT) IVPB 150 mg Intravenous Q12H Rudy Mccracken MD Last Rate: 150 mg (07/25/18 0932)   melatonin 3 mg Oral HS Heri Espino MD    phenytoin 100 mg Intravenous Q8H Jennifer Escalante DO    senna 8 8 mg Per NG Tube HS Georgette Thomas PA-C    valproate sodium 500 mg Intravenous Q8H [facility] Larissa Lopes PA-C Last Rate: 500 mg (07/25/18 0540)     Continuous Infusions:  dextrose 5 % and sodium chloride 0 33 % 40 mL/hr Last Rate: 40 mL/hr (07/25/18 1251) PRN Meds:   acetaminophen     Vitals: Blood pressure (!) 120/43, pulse 87, temperature 97 5 °F (36 4 °C), temperature source Oral, resp  rate 18, height 5' 2" (1 575 m), weight 69 8 kg (153 lb 14 1 oz), SpO2 94 %, not currently breastfeeding  ,Body mass index is 28 15 kg/m²  Physical Exam:  Physical Exam   Constitutional: No distress  Bilateral wrist restraints noted   HENT:   Head: Normocephalic and atraumatic  Neck: Normal range of motion  Neck supple  Cardiovascular: Normal rate and regular rhythm  Pulmonary/Chest: Effort normal and breath sounds normal    Abdominal: Soft  Bowel sounds are normal    Skin: Skin is warm and dry  She is not diaphoretic  Ecchymotic areas noted  Edema noted   Psychiatric:   Unable to assess secondary to encephalopathy     Neurologic Exam     Mental Status   Patient sleeping upon entering the room  She opens eyes briefly after repeated verbal stimuli  She remains drowsy throughout exam   She does not answer any orienting questions  She continues to repeat unaudible word throughout exam, with and without questioning  She is not clearly following commands  Cranial Nerves   Difficult to assess secondary to patient's encephalopathy  Grossly intact     Motor Exam   Withdrawal from noxious stimuli extremities x4 symmetrically  Tone mildly increased symmetrical throughout  Did not follow commands enough for formal motor exam     Sensory Exam   Sensation intact to noxious stimuli extremities x4     Gait, Coordination, and Reflexes   Plantar reflex upgoing bilaterally vs withdrawal  Reflexes diminished throughout       Lab Results: I have personally reviewed pertinent reports    Recent Results (from the past 24 hour(s))   Phenytoin level, total    Collection Time: 07/25/18  4:38 AM   Result Value Ref Range    Phenytoin Lvl 12 9 10 0 - 20 0 ug/mL   Valproic acid level, total    Collection Time: 07/25/18  4:38 AM   Result Value Ref Range    Valproic Acid, Total 62 50 - 100 ug/mL   Hemoglobin A1C    Collection Time: 07/25/18  4:38 AM   Result Value Ref Range    Hemoglobin A1C 6 0 4 2 - 6 3 %     mg/dl   Basic metabolic panel    Collection Time: 07/25/18  4:38 AM   Result Value Ref Range    Sodium 138 136 - 145 mmol/L    Potassium 4 0 3 5 - 5 3 mmol/L    Chloride 105 100 - 108 mmol/L    CO2 27 21 - 32 mmol/L    Anion Gap 6 4 - 13 mmol/L    BUN 15 5 - 25 mg/dL    Creatinine 0 72 0 60 - 1 30 mg/dL    Glucose 315 (H) 65 - 140 mg/dL    Calcium 7 9 (L) 8 3 - 10 1 mg/dL    eGFR 75 ml/min/1 73sq m     Imaging Studies: I have personally reviewed pertinent reports and I have personally reviewed pertinent films in PACS  EKG, Pathology, and Other Studies: I have personally reviewed pertinent reports    VTE Prophylaxis: Heparin

## 2018-07-25 NOTE — PLAN OF CARE
DISCHARGE PLANNING     Discharge to home or other facility with appropriate resources Progressing        DISCHARGE PLANNING - CARE MANAGEMENT     Discharge to post-acute care or home with appropriate resources Progressing        GENITOURINARY - ADULT     Maintains or returns to baseline urinary function Progressing     Absence of urinary retention Progressing        INFECTION - ADULT     Absence or prevention of progression during hospitalization Progressing        Knowledge Deficit     Patient/family/caregiver demonstrates understanding of disease process, treatment plan, medications, and discharge instructions Progressing        METABOLIC, FLUID AND ELECTROLYTES - ADULT     Electrolytes maintained within normal limits Progressing     Fluid balance maintained Progressing        NEUROSENSORY - ADULT     Achieves stable or improved neurological status Progressing     Absence of seizures Progressing     Remains free of injury related to seizures activity Progressing     Achieves maximal functionality and self care Progressing        Nutrition/Hydration-ADULT     Nutrient/Hydration intake appropriate for improving, restoring or maintaining nutritional needs Progressing        PAIN - ADULT     Verbalizes/displays adequate comfort level or baseline comfort level Progressing        Potential for Falls     Patient will remain free of falls Progressing        Prexisting or High Potential for Compromised Skin Integrity     Skin integrity is maintained or improved Progressing        SAFETY ADULT     Maintain or return to baseline ADL function Progressing     Maintain or return mobility status to optimal level Progressing     Patient will remain free of falls Progressing        SAFETY,RESTRAINT: NV/NON-SELF DESTRUCTIVE BEHAVIOR     Remains free of harm/injury (restraint for non violent/non self-detsructive behavior) Progressing     Returns to optimal restraint-free functioning Progressing

## 2018-07-25 NOTE — PHYSICIAN ADVISOR
Current patient class: Inpatient  The patient is currently on Hospital Day: 13      The patient was admitted to the hospital at 2111 on 7/12/18 for the following diagnosis:  UTI (urinary tract infection) [N39 0]  Weakness [R53 1]  Heme positive stool [R19 5]  Ambulatory dysfunction [R26 2]     CMS OUTLIER STAY REVIEW    After review of the relevant documentation, labs, vital signs and test results, the patient is appropriate for CONTINUED INPATIENT ADMISSION  The patient continues to remain hospitalized receiving acute medical care  The patient has surpassed the expected duration of stay, however given the clinical condition, need for further acute care management, the patient is appropriate to remain in an inpatient status  The patient still being actively managed, and does have unresolved medical issues requiring further hospitalization  This review is conducted at 10 day intervals, to help satisfy the requirements for significant outlier stay review as per CMS  Given the current condition of this patient, the patient satisfies this review was determination for continued inpatient stay  Rationale is as follows: The patient is a 80 yrs old Female who presented to the ED at 7/12/2018  6:40 PM with a chief complaint of Weakness - Generalized (Pt states she called ems due to weakness, states she just could not get out of her chair)     Given the need for further hospitalization, and along with the documentation of medical necessity present in the chart, the patient is appropriate for inpatient admission  The patient is expected to satisfy the 2 midnight benchmark, and will require further acute medical care  The patient does have comorbid conditions which increases the risk for significant adverse outcome  Given this the patient is appropriate for inpatient admission        The patients vitals on arrival were ED Triage Vitals   Temperature Pulse Respirations Blood Pressure SpO2   07/12/18 1845 07/12/18 1843 07/12/18 1843 07/12/18 1843 07/12/18 1843   99 7 °F (37 6 °C) 81 20 152/75 98 %      Temp Source Heart Rate Source Patient Position - Orthostatic VS BP Location FiO2 (%)   07/12/18 1845 07/12/18 2005 07/12/18 2005 07/12/18 2005 --   Rectal Monitor Lying Left arm       Pain Score       07/12/18 1843       No Pain           Past Medical History:   Diagnosis Date    Amaurosis fugax 11/18/2016    Arthritis     History of subarachnoid hemorrhage     Hyperlipidemia     Hypertension     Nephrolithiasis     Thyroid nodule 7/17/2018     Past Surgical History:   Procedure Laterality Date    LITHOTRIPSY      WI FRAGMENT KIDNEY STONE/ ESWL Left 1/3/2017    Procedure: Layla Chau EXTRACORPORAL SHOCKWAVE (ESWL); Surgeon: Raul Whitehead MD;  [facility];  Service: Urology    WI FRAGMENT KIDNEY STONE/ ESWL Right 11/18/2016    Procedure: Juliana Loomis SHOCKWAVE (ESWL);   Surgeon: Raul Whitehead MD;  [facility];  Service: Urology    URETERAL STENT PLACEMENT Bilateral 11/9/2016    Procedure: Agatha Mireles; Liborio Christianson ;  Surgeon: Raul Whitehead MD;  [facility];  Service:            Consults have been placed to:   IP CONSULT TO NEUROLOGY  IP CONSULT TO MEDICAL CRITICAL CARE  IP CONSULT TO NUTRITION SERVICES  IP CONSULT TO PICC TEAM  IP CONSULT TO GERONTOLOGY    Vitals:    07/23/18 1525 07/23/18 2347 07/24/18 0600 07/24/18 1529   BP: 126/56 (!) 100/44  112/52   BP Location: Left arm Left arm  Left arm   Pulse: 82 80  88   Resp: 18 18  20   Temp: 98 6 °F (37 °C) 99 5 °F (37 5 °C)  97 8 °F (36 6 °C)   TempSrc: Axillary Axillary  Axillary   SpO2: 98% 95%  97%   Weight:   69 8 kg (153 lb 14 1 oz)    Height:           Most recent labs:    Recent Labs      07/24/18   0454   K  4 4   NA  143   CALCIUM  8 5   BUN  19   CREATININE  0 71       Scheduled Meds:  Current Facility-Administered Medications:  acetaminophen 650 mg Per NG Tube Q6H PRN Media Mediate, PA-C acetaminophen 650 mg Oral Once Eva Huerta PA-C    acetaminophen 975 mg Oral [facility] Steve Laughlin DO    bisacodyl 10 mg Rectal Daily Melody Earl DO    dextrose 5 % and sodium chloride 0 33 % 40 mL/hr Intravenous Continuous Steve Laughlin DO Last Rate: 40 mL/hr (07/24/18 1049)   heparin (porcine) 5,000 Units Subcutaneous Q8H [address] Sandy Higginbotham MD    lacosamide (VIMPAT) IVPB 150 mg Intravenous Q12H Keesha Finch MD Last Rate: Stopped (07/24/18 1114)   melatonin 3 mg Oral HS Vanita Martell MD    phenytoin 100 mg Intravenous Q8H Jenniferjaney Escalante DO    senna 8 8 mg Per NG Tube HS Ramakrishna Dunaway PA-C    valproate sodium 500 mg Intravenous Q8H [address] Larissa Lopes PA-C Last Rate: 500 mg (07/24/18 2159)     Continuous Infusions:  dextrose 5 % and sodium chloride 0 33 % 40 mL/hr Last Rate: 40 mL/hr (07/24/18 1049)     PRN Meds:   acetaminophen    Surgical procedures (if appropriate):

## 2018-07-25 NOTE — PROGRESS NOTES
[address] Internal Medicine Progress Note  Patient: Ana Luisa Brooks 80 y o  female   MRN: [id number]  PCP: John Kirkpatrick MD  Unit/Bed#: [facility] 732-01 Encounter: [id number]  Date Of Visit: 07/25/18    Assessment:    Principal Problem:    Status epilepticus ([address] )  Active Problems:    Hypertension    Weakness    Ambulatory dysfunction    Thyroid nodule    Hyperchloremia    Encephalopathy with underlying dementia    Hypernatremia    Hypokalemia      Plan:    · Encephalopathy multifactorial discussed with Neurology, continue aggressive management of underlying causes, metabolic derangements, close monitoring, Geriatric input noted  · 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, discussed with Neurology  · Hypernatremia likely due to poor p o  intake and dehydration, sodium levels improving with hypotonic IV fluids  · Dysphagia on Keofeed tube speech therapy following  · E coli UTI completed antibiotics  · Generalized weakness, asthenia multifactorial  · Ambulatory dysfunction  · Hypertension monitor blood pressures         VTE Pharmacologic Prophylaxis:   Pharmacologic: Heparin  Mechanical VTE Prophylaxis in Place: Yes    Patient Centered Rounds: I have performed bedside rounds with nursing staff today  Discussions with Specialists or Other Care Team Provider: neurology, geriatrics physician assistant    Education and Discussions with Family / Patient:  Called and updated daughter involved over phone in detail    Time Spent for Care: 30 minutes  More than 50% of total time spent on counseling and coordination of care as described above      Current Length of Stay: 13 day(s)    Current Patient Status: Inpatient   Certification Statement: The patient will continue to require additional inpatient hospital stay due to As outlined    Discharge Plan / Estimated Discharge Date:  Awaiting clinical and symptomatic improvement    Code Status: Level 3 - DNAR and DNI      Subjective:     Patient arousable to pain      Objective:     Vitals:   Temp (24hrs), Av °F (36 7 °C), Min:97 5 °F (36 4 °C), Max:98 9 °F (37 2 °C)    HR:  [87-94] 94  Resp:  [18-20] 18  BP: ()/(43-97) 95/70  SpO2:  [94 %-100 %] 94 %  Body mass index is 28 15 kg/m²  Input and Output Summary (last 24 hours): Intake/Output Summary (Last 24 hours) at 18 1659  Last data filed at 18 0620   Gross per 24 hour   Intake                0 ml   Output             1586 ml   Net            -1586 ml       Physical Exam:     Physical Exam    Patient is encephalopathy but arousable to painful stimuli however she is unable to keep herself awake  Mucous membranes are dry  Neck supple  Lungs diminished breath sounds bases  Heart sounds S1 and S2 noted  Abdomen soft  Pulses noted  No rash  Maite ankle edema noted    Additional Data:     Labs:      Results from last 7 days  Lab Units 18  0553   WBC Thousand/uL 6 77   HEMOGLOBIN g/dL 13 2   HEMATOCRIT % 42 3   PLATELETS Thousands/uL 168   NEUTROS PCT % 68   LYMPHS PCT % 20   MONOS PCT % 8   EOS PCT % 4       Results from last 7 days  Lab Units 18  0438   SODIUM mmol/L 138   POTASSIUM mmol/L 4 0   CHLORIDE mmol/L 105   CO2 mmol/L 27   BUN mg/dL 15   CREATININE mg/dL 0 72   CALCIUM mg/dL 7 9*   GLUCOSE RANDOM mg/dL 315*           * I Have Reviewed All Lab Data Listed Above  * Additional Pertinent Lab Tests Reviewed:  All Labs Within Last 24 Hours Reviewed    Imaging:    Imaging Reports Reviewed Today Include:   Imaging Personally Reviewed by Myself Includes:     Recent Cultures (last 7 days):           Last 24 Hours Medication List:     Current Facility-Administered Medications:  acetaminophen 650 mg Per NG Tube Q6H PRN GRACY KELSEY    acetaminophen 650 mg Oral Once Naina Candelario PA-C    acetaminophen 975 mg Oral Q8H [facility] Iris [address] DO    bisacodyl 10 mg Rectal Daily Sharonda Butler DO    dextrose 5 % and sodium chloride 0 33 % 40 mL/hr Intravenous Continuous Angelia Sers DO Raj Last Rate: 40 mL/hr (07/25/18 1251)   heparin (porcine) 5,000 Units Subcutaneous Q8H [facility] Sandy Higginbotham MD    lacosamide (VIMPAT) IVPB 150 mg Intravenous Q12H Drew Harden MD Last Rate: 150 mg (07/25/18 0932)   melatonin 3 mg Oral HS Patrizia Dick MD    phenytoin 100 mg Intravenous Q8H Jennifer Escalante DO    senna 8 8 mg Per NG Tube HS Rachael Montes PA-C    valproate sodium 500 mg Intravenous Q8H [facility] Larissa Lopes PA-C Last Rate: 500 mg (07/25/18 1601)        Today, Patient Was Seen By: Derrick Fothergill, MD    ** Please Note: This note has been constructed using a voice recognition system   **

## 2018-07-25 NOTE — PHYSICAL THERAPY NOTE
Physical Therapy Treatment Note     07/25/18 1010   Pain Assessment   Pain Assessment 0-10   Pain Rating: FLACC (Rest) - Face 1   Pain Rating: FLACC (Rest) - Legs 1   Pain Rating: FLACC (Rest) - Activity 1   Pain Rating: FLACC (Rest) - Cry 1   Pain Rating: FLACC (Rest) - Consolability 2   Score: FLACC (Rest) 6   Pain Rating: FLACC (Activity) - Face 1   Pain Rating: FLACC (Activity) - Legs 1   Pain Rating: FLACC (Activity) - Activity 1   Pain Rating: FLACC (Activity) - Cry 1   Pain Rating: FLACC (Activity) - Consolability 2   Score: FLACC (Activity) 6   Restrictions/Precautions   Weight Bearing Precautions Per Order No   Other Precautions Cognitive; Chair Alarm; Bed Alarm;Aspiration;Multiple lines;Telemetry;O2;Fall Risk;Pain   General   Chart Reviewed Yes   Family/Caregiver Present No   Cognition   Overall Cognitive Status Impaired   Arousal/Participation Poorly responsive   Attention Difficulty attending to directions   Orientation Level Oriented to person   Memory Unable to assess   Following Commands Unable to follow one step commands   Subjective   Subjective wakes to voice verbally- states "What do you want" frequently, but no other verbalizations  Not following commands  grimaces w/ R > L LE ROM   Bed Mobility   Rolling R 1  Dependent   Additional items Assist x 1   Endurance Deficit   Endurance Deficit Yes   Endurance Deficit Description cog, fatigue, weakness   Activity Tolerance   Activity Tolerance Patient limited by fatigue;Treatment limited secondary to medical complications (Comment); Patient limited by pain   Nurse Made Aware yes   Exercises   Hamstring Stretch (prom B LEs in supine x 5-10 reps all planes)   Assessment   Prognosis Guarded   Problem List Decreased strength;Decreased range of motion;Decreased endurance; Impaired balance;Decreased mobility; Decreased coordination;Decreased cognition;Decreased safety awareness; Impaired judgement;Pain; Impaired hearing   Assessment Pt seen for re-evaluation  Needed re-eval as sent to ICU, and then placed on EMU  Pt now back on P7  Pt remains w/ decreased responsiveness, and has pain w LE mvmt/ROM  Did not assist w/ same, or follow commands  Not appropriate for bed mob/EOB until more alert and participative  Pt continues to present w/ decreased B LE strength, overall endurance  will benefit from skilled PT to correct for the above problems, as well as to assess mobility once more medically appropriate  Recommend rehab at d/c   Goals   Patient Goals unable to state due to confusion, lethargy   STG Expiration Date 08/08/18   Short Term Goal #1 1-2 wks: PT to see to assess mobility and set related goals and POC   Treatment Day 3   Plan   Treatment/Interventions Functional transfer training;LE strengthening/ROM; Therapeutic exercise; Endurance training;Patient/family training;Equipment eval/education; Bed mobility;Gait training   Progress Slow progress, medical status limitations   PT Frequency 2-3x/wk   Recommendation   Recommendation (recommend rehab at dc)   PT - OK to Discharge Yes  (to rehab when stable)   Harish Maldonado PT, DPT CSRS

## 2018-07-25 NOTE — PLAN OF CARE
Problem: PHYSICAL THERAPY ADULT  Goal: Performs mobility at highest level of function for planned discharge setting  See evaluation for individualized goals  Treatment/Interventions: Functional transfer training, LE strengthening/ROM, Therapeutic exercise, Endurance training, Patient/family training, Equipment eval/education, Bed mobility  Equipment Recommended: Walker (RW)       See flowsheet documentation for full assessment, interventions and recommendations  Prognosis: Guarded  Problem List: Decreased strength, Decreased range of motion, Decreased endurance, Impaired balance, Decreased mobility, Decreased coordination, Decreased cognition, Decreased safety awareness, Impaired judgement, Pain, Impaired hearing  Assessment: Pt seen for re-evaluation  Needed re-eval as sent to ICU, and then placed on EMU  Pt now back on P7  Pt remains w/ decreased responsiveness, and has pain w LE mvmt/ROM  Did not assist w/ same, or follow commands  Not appropriate for bed mob/EOB until more alert and participative  Pt continues to present w/ decreased B LE strength, overall endurance  will benefit from skilled PT to correct for the above problems, as well as to assess mobility once more medically appropriate  Recommend rehab at d/c        Recommendation:  (recommend rehab at dc)     PT - OK to Discharge: (S) Yes (to rehab when stable)    See flowsheet documentation for full assessment

## 2018-07-26 ENCOUNTER — APPOINTMENT (INPATIENT)
Dept: RADIOLOGY | Facility: HOSPITAL | Age: 83
DRG: 682 | End: 2018-07-26
Payer: MEDICARE

## 2018-07-26 LAB
ALBUMIN SERPL BCP-MCNC: 2 G/DL (ref 3.5–5)
ALP SERPL-CCNC: 70 U/L (ref 46–116)
ALT SERPL W P-5'-P-CCNC: 37 U/L (ref 12–78)
AMMONIA PLAS-SCNC: 49 UMOL/L (ref 11–35)
ANION GAP SERPL CALCULATED.3IONS-SCNC: 4 MMOL/L (ref 4–13)
ANION GAP SERPL CALCULATED.3IONS-SCNC: 5 MMOL/L (ref 4–13)
ARTERIAL PATENCY WRIST A: YES
AST SERPL W P-5'-P-CCNC: 34 U/L (ref 5–45)
BACTERIA UR QL AUTO: ABNORMAL /HPF
BASE EXCESS BLDA CALC-SCNC: 0.5 MMOL/L
BASOPHILS # BLD AUTO: 0.02 THOUSANDS/ΜL (ref 0–0.1)
BASOPHILS NFR BLD AUTO: 0 % (ref 0–1)
BILIRUB SERPL-MCNC: 0.26 MG/DL (ref 0.2–1)
BILIRUB UR QL STRIP: NEGATIVE
BUN SERPL-MCNC: 20 MG/DL (ref 5–25)
BUN SERPL-MCNC: 21 MG/DL (ref 5–25)
CALCIUM SERPL-MCNC: 7.9 MG/DL (ref 8.3–10.1)
CALCIUM SERPL-MCNC: 8.3 MG/DL (ref 8.3–10.1)
CHLORIDE SERPL-SCNC: 107 MMOL/L (ref 100–108)
CHLORIDE SERPL-SCNC: 107 MMOL/L (ref 100–108)
CLARITY UR: CLEAR
CO2 SERPL-SCNC: 27 MMOL/L (ref 21–32)
CO2 SERPL-SCNC: 28 MMOL/L (ref 21–32)
COLOR UR: YELLOW
CREAT SERPL-MCNC: 0.81 MG/DL (ref 0.6–1.3)
CREAT SERPL-MCNC: 0.82 MG/DL (ref 0.6–1.3)
EOSINOPHIL # BLD AUTO: 0.42 THOUSAND/ΜL (ref 0–0.61)
EOSINOPHIL NFR BLD AUTO: 4 % (ref 0–6)
ERYTHROCYTE [DISTWIDTH] IN BLOOD BY AUTOMATED COUNT: 15.5 % (ref 11.6–15.1)
ERYTHROCYTE [DISTWIDTH] IN BLOOD BY AUTOMATED COUNT: 15.8 % (ref 11.6–15.1)
GFR SERPL CREATININE-BSD FRML MDRD: 64 ML/MIN/1.73SQ M
GFR SERPL CREATININE-BSD FRML MDRD: 65 ML/MIN/1.73SQ M
GLUCOSE SERPL-MCNC: 150 MG/DL (ref 65–140)
GLUCOSE SERPL-MCNC: 157 MG/DL (ref 65–140)
GLUCOSE SERPL-MCNC: 190 MG/DL (ref 65–140)
GLUCOSE UR STRIP-MCNC: NEGATIVE MG/DL
HCO3 BLDA-SCNC: 23.3 MMOL/L (ref 22–28)
HCT VFR BLD AUTO: 35.7 % (ref 34.8–46.1)
HCT VFR BLD AUTO: 36.8 % (ref 34.8–46.1)
HEMOCCULT STL QL: POSITIVE
HGB BLD-MCNC: 11.5 G/DL (ref 11.5–15.4)
HGB BLD-MCNC: 12 G/DL (ref 11.5–15.4)
HGB UR QL STRIP.AUTO: ABNORMAL
HYALINE CASTS #/AREA URNS LPF: ABNORMAL /LPF
IMM GRANULOCYTES # BLD AUTO: 0.25 THOUSAND/UL (ref 0–0.2)
IMM GRANULOCYTES NFR BLD AUTO: 2 % (ref 0–2)
INR PPP: 1.13 (ref 0.86–1.17)
KETONES UR STRIP-MCNC: NEGATIVE MG/DL
LACTATE SERPL-SCNC: 1.7 MMOL/L (ref 0.5–2)
LEUKOCYTE ESTERASE UR QL STRIP: NEGATIVE
LYMPHOCYTES # BLD AUTO: 0.49 THOUSANDS/ΜL (ref 0.6–4.47)
LYMPHOCYTES NFR BLD AUTO: 4 % (ref 14–44)
MCH RBC QN AUTO: 30 PG (ref 26.8–34.3)
MCH RBC QN AUTO: 30.4 PG (ref 26.8–34.3)
MCHC RBC AUTO-ENTMCNC: 32.2 G/DL (ref 31.4–37.4)
MCHC RBC AUTO-ENTMCNC: 32.6 G/DL (ref 31.4–37.4)
MCV RBC AUTO: 93 FL (ref 82–98)
MCV RBC AUTO: 93 FL (ref 82–98)
MONOCYTES # BLD AUTO: 0.92 THOUSAND/ΜL (ref 0.17–1.22)
MONOCYTES NFR BLD AUTO: 8 % (ref 4–12)
NEUTROPHILS # BLD AUTO: 9.05 THOUSANDS/ΜL (ref 1.85–7.62)
NEUTS SEG NFR BLD AUTO: 82 % (ref 43–75)
NITRITE UR QL STRIP: NEGATIVE
NON VENT ROOM AIR: 21 %
NON-SQ EPI CELLS URNS QL MICRO: ABNORMAL /HPF
NRBC BLD AUTO-RTO: 0 /100 WBCS
O2 CT BLDA-SCNC: 16.7 ML/DL (ref 16–23)
OXYHGB MFR BLDA: 93.3 % (ref 94–97)
PCO2 BLDA: 31.7 MM HG (ref 36–44)
PH BLDA: 7.48 [PH] (ref 7.35–7.45)
PH UR STRIP.AUTO: 6.5 [PH] (ref 4.5–8)
PLATELET # BLD AUTO: 81 THOUSANDS/UL (ref 149–390)
PLATELET # BLD AUTO: 97 THOUSANDS/UL (ref 149–390)
PMV BLD AUTO: 10.5 FL (ref 8.9–12.7)
PMV BLD AUTO: 11.3 FL (ref 8.9–12.7)
PO2 BLDA: 67 MM HG (ref 75–129)
POTASSIUM SERPL-SCNC: 4 MMOL/L (ref 3.5–5.3)
POTASSIUM SERPL-SCNC: 4.4 MMOL/L (ref 3.5–5.3)
PROCALCITONIN SERPL-MCNC: 0.59 NG/ML
PROT SERPL-MCNC: 5 G/DL (ref 6.4–8.2)
PROT UR STRIP-MCNC: ABNORMAL MG/DL
PROTHROMBIN TIME: 14.6 SECONDS (ref 11.8–14.2)
RBC # BLD AUTO: 3.83 MILLION/UL (ref 3.81–5.12)
RBC # BLD AUTO: 3.95 MILLION/UL (ref 3.81–5.12)
RBC #/AREA URNS AUTO: ABNORMAL /HPF
SODIUM SERPL-SCNC: 139 MMOL/L (ref 136–145)
SODIUM SERPL-SCNC: 139 MMOL/L (ref 136–145)
SP GR UR STRIP.AUTO: 1.01 (ref 1–1.03)
SPECIMEN SOURCE: ABNORMAL
UROBILINOGEN UR QL STRIP.AUTO: 1 E.U./DL
WBC # BLD AUTO: 10.43 THOUSAND/UL (ref 4.31–10.16)
WBC # BLD AUTO: 11.15 THOUSAND/UL (ref 4.31–10.16)
WBC #/AREA URNS AUTO: ABNORMAL /HPF

## 2018-07-26 PROCEDURE — 36600 WITHDRAWAL OF ARTERIAL BLOOD: CPT

## 2018-07-26 PROCEDURE — 82272 OCCULT BLD FECES 1-3 TESTS: CPT | Performed by: PHYSICIAN ASSISTANT

## 2018-07-26 PROCEDURE — 82945 GLUCOSE OTHER FLUID: CPT | Performed by: EMERGENCY MEDICINE

## 2018-07-26 PROCEDURE — 85027 COMPLETE CBC AUTOMATED: CPT | Performed by: PHYSICIAN ASSISTANT

## 2018-07-26 PROCEDURE — 62270 DX LMBR SPI PNXR: CPT | Performed by: ANESTHESIOLOGY

## 2018-07-26 PROCEDURE — 82948 REAGENT STRIP/BLOOD GLUCOSE: CPT

## 2018-07-26 PROCEDURE — 84145 PROCALCITONIN (PCT): CPT | Performed by: PHYSICIAN ASSISTANT

## 2018-07-26 PROCEDURE — 87653 STREP B DNA AMP PROBE: CPT | Performed by: EMERGENCY MEDICINE

## 2018-07-26 PROCEDURE — 82140 ASSAY OF AMMONIA: CPT | Performed by: PHYSICIAN ASSISTANT

## 2018-07-26 PROCEDURE — 99233 SBSQ HOSP IP/OBS HIGH 50: CPT | Performed by: INTERNAL MEDICINE

## 2018-07-26 PROCEDURE — 80048 BASIC METABOLIC PNL TOTAL CA: CPT | Performed by: INTERNAL MEDICINE

## 2018-07-26 PROCEDURE — 71045 X-RAY EXAM CHEST 1 VIEW: CPT

## 2018-07-26 PROCEDURE — 99291 CRITICAL CARE FIRST HOUR: CPT | Performed by: PHYSICIAN ASSISTANT

## 2018-07-26 PROCEDURE — 89051 BODY FLUID CELL COUNT: CPT | Performed by: EMERGENCY MEDICINE

## 2018-07-26 PROCEDURE — 89050 BODY FLUID CELL COUNT: CPT | Performed by: EMERGENCY MEDICINE

## 2018-07-26 PROCEDURE — 99233 SBSQ HOSP IP/OBS HIGH 50: CPT | Performed by: PSYCHIATRY & NEUROLOGY

## 2018-07-26 PROCEDURE — 85025 COMPLETE CBC W/AUTO DIFF WBC: CPT | Performed by: PHYSICIAN ASSISTANT

## 2018-07-26 PROCEDURE — 80053 COMPREHEN METABOLIC PANEL: CPT | Performed by: PHYSICIAN ASSISTANT

## 2018-07-26 PROCEDURE — 87798 DETECT AGENT NOS DNA AMP: CPT | Performed by: EMERGENCY MEDICINE

## 2018-07-26 PROCEDURE — 82805 BLOOD GASES W/O2 SATURATION: CPT | Performed by: PHYSICIAN ASSISTANT

## 2018-07-26 PROCEDURE — 87532 HHV-6 DNA AMP PROBE: CPT | Performed by: EMERGENCY MEDICINE

## 2018-07-26 PROCEDURE — 87496 CYTOMEG DNA AMP PROBE: CPT | Performed by: EMERGENCY MEDICINE

## 2018-07-26 PROCEDURE — 85610 PROTHROMBIN TIME: CPT | Performed by: PHYSICIAN ASSISTANT

## 2018-07-26 PROCEDURE — 83605 ASSAY OF LACTIC ACID: CPT | Performed by: PHYSICIAN ASSISTANT

## 2018-07-26 PROCEDURE — 87040 BLOOD CULTURE FOR BACTERIA: CPT | Performed by: PHYSICIAN ASSISTANT

## 2018-07-26 PROCEDURE — 87070 CULTURE OTHR SPECIMN AEROBIC: CPT | Performed by: EMERGENCY MEDICINE

## 2018-07-26 PROCEDURE — 87529 HSV DNA AMP PROBE: CPT | Performed by: EMERGENCY MEDICINE

## 2018-07-26 PROCEDURE — 84157 ASSAY OF PROTEIN OTHER: CPT | Performed by: EMERGENCY MEDICINE

## 2018-07-26 PROCEDURE — 87498 ENTEROVIRUS PROBE&REVRS TRNS: CPT | Performed by: EMERGENCY MEDICINE

## 2018-07-26 PROCEDURE — 81001 URINALYSIS AUTO W/SCOPE: CPT | Performed by: PHYSICIAN ASSISTANT

## 2018-07-26 RX ORDER — ACETAMINOPHEN 650 MG/1
650 SUPPOSITORY RECTAL EVERY 4 HOURS PRN
Status: DISCONTINUED | OUTPATIENT
Start: 2018-07-26 | End: 2018-07-29 | Stop reason: HOSPADM

## 2018-07-26 RX ORDER — LIDOCAINE HYDROCHLORIDE 10 MG/ML
5 INJECTION, SOLUTION EPIDURAL; INFILTRATION; INTRACAUDAL; PERINEURAL ONCE
Status: COMPLETED | OUTPATIENT
Start: 2018-07-26 | End: 2018-07-26

## 2018-07-26 RX ORDER — VANCOMYCIN HYDROCHLORIDE 1 G/200ML
15 INJECTION, SOLUTION INTRAVENOUS EVERY 24 HOURS
Status: DISCONTINUED | OUTPATIENT
Start: 2018-07-26 | End: 2018-07-28

## 2018-07-26 RX ORDER — PHENYTOIN SODIUM 50 MG/ML
50 INJECTION, SOLUTION INTRAMUSCULAR; INTRAVENOUS EVERY 8 HOURS
Status: DISCONTINUED | OUTPATIENT
Start: 2018-07-26 | End: 2018-07-27

## 2018-07-26 RX ADMIN — VALPROATE SODIUM 500 MG: 100 INJECTION, SOLUTION INTRAVENOUS at 05:08

## 2018-07-26 RX ADMIN — HEPARIN SODIUM 5000 UNITS: 5000 INJECTION, SOLUTION INTRAVENOUS; SUBCUTANEOUS at 05:08

## 2018-07-26 RX ADMIN — ACETAMINOPHEN 650 MG: 160 SUSPENSION ORAL at 13:58

## 2018-07-26 RX ADMIN — PHENYTOIN SODIUM 50 MG: 50 INJECTION INTRAMUSCULAR; INTRAVENOUS at 14:08

## 2018-07-26 RX ADMIN — ACETAMINOPHEN 650 MG: 650 SUPPOSITORY RECTAL at 20:42

## 2018-07-26 RX ADMIN — LIDOCAINE HYDROCHLORIDE 5 ML: 10 INJECTION, SOLUTION EPIDURAL; INFILTRATION; INTRACAUDAL; PERINEURAL at 22:14

## 2018-07-26 RX ADMIN — HEPARIN SODIUM 5000 UNITS: 5000 INJECTION, SOLUTION INTRAVENOUS; SUBCUTANEOUS at 13:58

## 2018-07-26 RX ADMIN — SODIUM CHLORIDE 150 MG: 9 INJECTION, SOLUTION INTRAVENOUS at 09:12

## 2018-07-26 RX ADMIN — SODIUM CHLORIDE 150 MG: 9 INJECTION, SOLUTION INTRAVENOUS at 21:12

## 2018-07-26 RX ADMIN — ACETAMINOPHEN 325 MG: 650 SUPPOSITORY RECTAL at 00:00

## 2018-07-26 RX ADMIN — VALPROATE SODIUM 500 MG: 100 INJECTION, SOLUTION INTRAVENOUS at 22:10

## 2018-07-26 RX ADMIN — PHENYTOIN SODIUM 50 MG: 50 INJECTION INTRAMUSCULAR; INTRAVENOUS at 23:47

## 2018-07-26 RX ADMIN — PHENYTOIN SODIUM 100 MG: 50 INJECTION INTRAMUSCULAR; INTRAVENOUS at 05:21

## 2018-07-26 RX ADMIN — VALPROATE SODIUM 500 MG: 100 INJECTION, SOLUTION INTRAVENOUS at 14:07

## 2018-07-26 RX ADMIN — BISACODYL 10 MG: 10 SUPPOSITORY RECTAL at 14:07

## 2018-07-26 RX ADMIN — DEXTROSE AND SODIUM CHLORIDE 40 ML/HR: 5; .33 INJECTION, SOLUTION INTRAVENOUS at 17:24

## 2018-07-26 NOTE — PROGRESS NOTES
Progress Note - Neurology   Tierrane Harvey 80 y o  female [id number]  Unit/Bed#: [facility] 732/[facility] 732-01    Assessment:  Marya Lopez is a 80 y  o  left handed female with history of amaurosis fugax in 11/2016, ICH in 6/2016, traumatic SAH in 2/2018 after falling when shoveling snow, history of ? 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  - LLE jerking has resolved  No further clinical signs of seizures  Patient is obtunded  - vEEG has been discontinued  - CT head negative for acute abnormality  - Repeat stat CTH negative for acute abnormality  - MRI brain with and without contrast was severe degraded by patient motion  No significant acute intracranial abnormality noted  Sequelae of prior subarachnoid hemorrhage noted with extensive hemosiderin staining  It may be reasonable for the patient to have a repeat MRI a brain down the road, especially due to her history of SAHs, when she is able to tolerate it better    I will leave this up to outpatient epileptologist   - AED regimen:   - IV Lacosamide (Vimpat) 150mg q 12 hours   - IV Depacon 500mg q 8 hours - 7/25 total level 62     - 7/21 Valproic acid total level 57    - 7/21 Valproic acid free level 24   - Decrease IV Phenytoin (Dilantin) 50mg q 8 hours     - 7/25 total level: 12 9 (however 22 2 adjusted for hypoalbumenemia)    - 7/21 total level: 11 9 (20 5 adjusted for hypoalbuminemia)    - 7/21 free level: 2 0  - Tele  - Seizure precautions  - Serial exams  - The patient will ultimately follow-up with outpatient epileptology  Communication has been sent to the office to schedule a follow-up appointment  Encephalopathy  Toxic metabolic secondary to recent elevated temp/leukocytosis, AED medication effect (especially based on the above levels in the setting of hypoalbuminemia)/sleep-wake cycle disturbance /stress from recent seizure/postictal effect superimposed on reduce cognitive reserve/likely cognitive impairment  Patient is obtunded on my exam, only with minimal withdrawal of extremities to noxious stim  Repeat ammonia level  S/p Geriatrics consult  Seroquel was discontinued  Encourage good sleep/wake cycle  Encourage out of bed to recliner if able  Frequent neurological checks/serial exams  Stat CT head with acute decline of in exam/mental status  Notify neurology with any changes in examination       Fever and leukocytosis  Tmax of 102 9 last night of unexplained etiology  WBC at 10 43, up from 6 77  UA negative  CXR negative  Blood cultures pending    Dysphagia  As patient's somnolence and encephalopathy improve, would recommend repeat swallow exam   Currently Keophed in place    Malnutrition  Albumin 2 4  Jevity    Ecoli UTI  Antibiotic treatment completed     Generalized weakness and ambulatory dysfunction  OT/PT  Recommending STR    Subjective:   Rah Montenegro is a 80 y o  left handed female with history of amaurosis fugax in 11/2016, hypertension, hyperlipidemia, traumatic SAH in 2/2018 after falling when shoveling snow, history of ?spontaneous SAHs who presented to the hospital on 07/12/2018 secondary to complaints of generalized weakness with inability to get out of her chair   CT head was negative for acute abnormality   A small focus of encephalomalacia at the left frontoparietal junction, the sequelae of prior hemorrhage, was noted  UA noted to be positive  Patient also with c/o LLQ discomfort  CT A/P revealed diverticula and nonobstructive nephrolithiasis  Antibiotics were started  Patient developed left upper extremity shaking which resolved, followed by a left lower extremity shaking 2 days later  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  Multiple AEDs trialed  Patient is currently on Vimpat, Dilantin and Depakote  No further clinical evidence of seizures  vEEG was discontinued  Patient with a Tmax of 102 9 last night with unclear etiology  CXR negative  UA negative  Blood cultures sent  Today on exam, the patient is obtunded and snoring on my exam  I am unable to wake her  No family present at bedside  Past Medical History:   Diagnosis Date    Amaurosis fugax 11/18/2016    Arthritis     History of subarachnoid hemorrhage     Hyperlipidemia     Hypertension     Nephrolithiasis     Thyroid nodule 7/17/2018     Past Surgical History:   Procedure Laterality Date    LITHOTRIPSY      SC FRAGMENT KIDNEY STONE/ ESWL Left 1/3/2017    Procedure: LITHROTRIPSY EXTRACORPORAL SHOCKWAVE (ESWL); Surgeon: Adams Gross MD;  [facility]: [facility];  Service: Urology    SC FRAGMENT KIDNEY STONE/ ESWL Right 11/18/2016    Procedure: Real Missy SHOCKWAVE (ESWL); Surgeon: Adams Gross MD;  [facility];  Service: Urology    URETERAL STENT PLACEMENT Bilateral 11/9/2016    Procedure: Sheryle Lehmann; Maicol Thomas ;  Surgeon: Adams Gross MD;  [facility];  Service:      Family history:  Family History   Problem Relation Age of Onset    Pneumonia Mother     Cancer Sister     Prostate cancer Brother        Social History     Social History    Marital status:       Spouse name: N/A    Number of children: N/A    Years of education: N/A     Social History Main Topics    Smoking status: Never Smoker    Smokeless tobacco: Never Used    Alcohol use No  Drug use: No    Sexual activity: Not Asked     Other Topics Concern    None     Social History Narrative    None       Scheduled Meds:    Current Facility-Administered Medications:  acetaminophen 650 mg Per NG Tube Q6H PRN Mary Lawson PA-C    acetaminophen 650 mg Oral Once Sonya Guevara PA-C    acetaminophen 325 mg Rectal Q4H PRN Larissa Lopes PA-C    acetaminophen 975 mg Oral Q8H [facility] Cici Nava DO    bisacodyl 10 mg Rectal Daily Hugh Valladares DO    dextrose 5 % and sodium chloride 0 33 % 40 mL/hr Intravenous Continuous [place] DO Elijah Last Rate: 40 mL/hr (07/25/18 1251)   heparin (porcine) 5,000 Units Subcutaneous Q8H [facility] Sandy Higginbotham MD    lacosamide (VIMPAT) IVPB 150 mg Intravenous Q12H Fuentes Mack MD Last Rate: 150 mg (07/26/18 0912)   melatonin 3 mg Oral HS Malcolm Villeda MD    phenytoin 100 mg Intravenous Q8H Jennifer Escalante DO    senna 8 8 mg Per NG Tube HS Rachael Montes PA-C    valproate sodium 500 mg Intravenous Q8H [facility] Larissa Lopes PA-C Last Rate: 500 mg (07/26/18 0508)     Continuous Infusions:    dextrose 5 % and sodium chloride 0 33 % 40 mL/hr Last Rate: 40 mL/hr (07/25/18 1251)     PRN Meds:   acetaminophen    acetaminophen    ROS: Unable to be obtained ( other than complaints of left breast and bilateral shoulder pain) due to AMS  Vitals: BP (!) 105/48 (BP Location: Left arm)   Pulse 101   Temp 98 3 °F (36 8 °C) (Axillary)   Resp 18   Ht 5' 2" (1 575 m)   Wt 70 7 kg (155 lb 13 8 oz)   SpO2 94%   BMI 28 51 kg/m²     Physical Exam:   Physical Exam   Constitutional: She appears well-developed and well-nourished  No distress  Elderly female, supine in bed, sleeping and snoring upon arrival   HENT:   Head: Normocephalic and atraumatic  Eyes: Conjunctivae are normal  Pupils are equal, round, and reactive to light  Right eye exhibits no discharge  Left eye exhibits no discharge  No scleral icterus     Patient does not track examiner when asked Neck: Normal range of motion  Neck supple  Cardiovascular: Normal rate and regular rhythm  Exam reveals no gallop and no friction rub  No murmur heard  Pulmonary/Chest: Effort normal and breath sounds normal  No stridor  No respiratory distress  She has no wheezes  She has no rales  She exhibits no tenderness  Musculoskeletal: She exhibits edema (Mild bilateral upper extremity)  She exhibits no tenderness or deformity  Lymphadenopathy:     She has no cervical adenopathy  Neurological:   Drowsy but able to maintain and alert state during my exam   Skin: Skin is warm and dry  No rash noted  No erythema  Mild ecchymosis on arms bilaterally as well as some edema     Neurologic Exam     Mental Status   Obtunded on exam  I cannot awake patient  Minimal localization to noxious stimuli x 4 extremities with RUE the most sluggish  No command following  No vocalization  Cranial Nerves     CN III, IV, VI   Pupils are equal, round, and reactive to light  Conjugate gaze: present    CN VII   Facial expression full, symmetric  No tracking     Motor Exam   Muscle bulk: decreased  Overall muscle tone: normal  Able to do PROM without grimace from patient  No AROM noted         Labs:  Recent Results (from the past 24 hour(s))   CBC    Collection Time: 07/26/18  1:07 AM   Result Value Ref Range    WBC 10 43 (H) 4 31 - 10 16 Thousand/uL    RBC 3 83 3 81 - 5 12 Million/uL    Hemoglobin 11 5 11 5 - 15 4 g/dL    Hematocrit 35 7 34 8 - 46 1 %    MCV 93 82 - 98 fL    MCH 30 0 26 8 - 34 3 pg    MCHC 32 2 31 4 - 37 4 g/dL    RDW 15 5 (H) 11 6 - 15 1 %    Platelets 97 (L) 741 - 390 Thousands/uL    MPV 10 5 8 9 - 12 7 fL   UA w Reflex to Microscopic w Reflex to Culture    Collection Time: 07/26/18  1:28 AM   Result Value Ref Range    Color, UA Yellow     Clarity, UA Clear     Specific [place], UA 1 015 1 003 - 1 030    pH, UA 6 5 4 5 - 8 0    Leukocytes, UA Negative Negative    Nitrite, UA Negative Negative    Protein, UA Trace (A) Negative mg/dl    Glucose, UA Negative Negative mg/dl    Ketones, UA Negative Negative mg/dl    Urobilinogen, UA 1 0 0 2, 1 0 E U /dl E U /dl    Bilirubin, UA Negative Negative    Blood, UA Trace (A) Negative   Urine Microscopic    Collection Time: 07/26/18  1:28 AM   Result Value Ref Range    RBC, UA 4-10 (A) None Seen, 0-5 /hpf    WBC, UA 2-4 (A) None Seen, 0-5, 5-55, 5-65 /hpf    Epithelial Cells None Seen None Seen, Occasional /hpf    Bacteria, UA None Seen None Seen, Occasional /hpf    Hyaline Casts, UA None Seen None Seen /lpf   Basic metabolic panel    Collection Time: 07/26/18  5:17 AM   Result Value Ref Range    Sodium 139 136 - 145 mmol/L    Potassium 4 0 3 5 - 5 3 mmol/L    Chloride 107 100 - 108 mmol/L    CO2 27 21 - 32 mmol/L    Anion Gap 5 4 - 13 mmol/L    BUN 20 5 - 25 mg/dL    Creatinine 0 81 0 60 - 1 30 mg/dL    Glucose 150 (H) 65 - 140 mg/dL    Calcium 8 3 8 3 - 10 1 mg/dL    eGFR 65 ml/min/1 73sq m   Fingerstick Glucose (POCT)    Collection Time: 07/26/18  6:52 AM   Result Value Ref Range    POC Glucose 190 (H) 65 - 140 mg/dl     Imaging: I have personally reviewed pertinent imaging and PACS reports       VTE Prophylaxis: Heparin

## 2018-07-26 NOTE — PROGRESS NOTES
[address] Internal Medicine Progress Note  Patient: Ander Pyle 80 y o  female   MRN: [id number]  PCP: Kash Mcneal MD  Unit/Bed#: [facility] 732-01 Encounter: [id number]  Date Of Visit: 07/26/18    Assessment:    Principal Problem:    Status epilepticus ([address] )  Active Problems:    Hypertension    Weakness    Ambulatory dysfunction    Thyroid nodule    Hyperchloremia    Encephalopathy with underlying dementia    Hypernatremia    Hypokalemia      Plan:    · Encephalopathy multifactorial continue aggressive management of underlying causes, stable as metabolic derangements, discussed with Neurology, geriatrics following  · 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 per Neurology, discussed with Neurology  · Hypernatremia sodium levels old improving continue hypotonic IV fluids  · Fever with no source presently, monitor closely, will consider empiric antibiotics if continues to have fever   · Dysphagia on Keofeed tube feeds  · E coli UTI completed antibiotics  · Hypertension monitor blood pressures  · General weakness, asthenia multifactorial  · Goals of care overall guarded prognosis discussed with the son over phone in detail      VTE Pharmacologic Prophylaxis:   Pharmacologic: Heparin  Mechanical VTE Prophylaxis in Place: Yes    Patient Centered Rounds: I have performed bedside rounds with nursing staff today  Discussions with Specialists or Other Care Team Provider:  Neurology    Education and Discussions with Family / Patient:  Discussed with son over phone in detail    Time Spent for Care: 30 minutes  More than 50% of total time spent on counseling and coordination of care as described above      Current Length of Stay: 14 day(s)    Current Patient Status: Inpatient   Certification Statement: The patient will continue to require additional inpatient hospital stay due to As mentioned    Discharge Plan / Estimated Discharge Date:  Overall guarded prognosis needs close monitoring as outlined    Code Status: Level 3 - DNAR and DNI      Subjective:     Patient is lethargic  Nonverbal    Objective:     Vitals:   Temp (24hrs), Av 4 °F (38 °C), Min:98 3 °F (36 8 °C), Max:102 9 °F (39 4 °C)    HR:  [] 96  Resp:  [18-20] 20  BP: (105-129)/(48-60) 115/52  SpO2:  [94 %] 94 %  Body mass index is 28 51 kg/m²  Input and Output Summary (last 24 hours): Intake/Output Summary (Last 24 hours) at 18 1649  Last data filed at 18 1206   Gross per 24 hour   Intake           615 67 ml   Output              446 ml   Net           169 67 ml       Physical Exam:     Physical Exam    Comfortably lying in bed  Neck supple  Mucous members are dry  Heart sounds S1-S2 noted  Lungs bilateral diminished breath sounds  Abdomen soft  Patient is encephalopathic, minimally responsive to painful and verbal stimuli  Pulses noted  No rash    Additional Data:     Labs:      Results from last 7 days  Lab Units 18  0107 18  0553   WBC Thousand/uL 10 43* 6 77   HEMOGLOBIN g/dL 11 5 13 2   HEMATOCRIT % 35 7 42 3   PLATELETS Thousands/uL 97* 168   NEUTROS PCT %  --  68   LYMPHS PCT %  --  20   MONOS PCT %  --  8   EOS PCT %  --  4       Results from last 7 days  Lab Units 18  0517   SODIUM mmol/L 139   POTASSIUM mmol/L 4 0   CHLORIDE mmol/L 107   CO2 mmol/L 27   BUN mg/dL 20   CREATININE mg/dL 0 81   CALCIUM mg/dL 8 3   GLUCOSE RANDOM mg/dL 150*           * I Have Reviewed All Lab Data Listed Above  * Additional Pertinent Lab Tests Reviewed:  All Labs Within Last 24 Hours Reviewed    Imaging:    Imaging Reports Reviewed Today Include:   Imaging Personally Reviewed by Myself Includes:    Recent Cultures (last 7 days):           Last 24 Hours Medication List:     Current Facility-Administered Medications:  acetaminophen 650 mg Per NG Tube Q6H PRN Julisa Brown PA-C    acetaminophen 650 mg Oral Once Po Ziegler PA-C    acetaminophen 325 mg Rectal Q4H PRN Larissa Lopes PA-C    acetaminophen 975 mg Oral Q8H [facility] Micaela Norris DO    bisacodyl 10 mg Rectal Daily Arabella Pacheco DO    dextrose 5 % and sodium chloride 0 33 % 40 mL/hr Intravenous Continuous Micaela DO Jr Last Rate: 40 mL/hr (07/25/18 1251)   heparin (porcine) 5,000 Units Subcutaneous Q8H [facility] Sandy Higginbotham MD    lacosamide (VIMPAT) IVPB 150 mg Intravenous Q12H Drew Harden MD Last Rate: 150 mg (07/26/18 0912)   melatonin 3 mg Oral HS Patrizia Dick MD    phenytoin 50 mg Intravenous Q8H Dari Manuel PA-C    senna 8 8 mg [facility]    valproate sodium 500 mg Intravenous Q8H [facility] Larissa Lopes PA-C Last Rate: 500 mg (07/26/18 1407)        Today, Patient Was Seen By: Derrick Fothergill, MD    ** Please Note: This note has been constructed using a voice recognition system   **

## 2018-07-26 NOTE — OCCUPATIONAL THERAPY NOTE
OT CANCEL NOTE    Pt chart reviewed  Spoke w/ RN, Jenise, who reported pt is very lethargic and only responsive to painful stimuli at this time  Pt not medically stable to engage in OT re-evaluation at this time  RN also confirmed pt may be going hospice tomorrow pending progress  Will continue to follow pt on caseload and see when medically stable and as clinically appropriate      iMgdalia Blas MOT, OTR/L

## 2018-07-26 NOTE — SOCIAL WORK
Updated clinical information sent to [facility] via [facility] states pt is accepted when medically stable

## 2018-07-27 LAB
APPEARANCE CSF: CLEAR
C GATTII+NEOFOR DNA CSF QL NAA+NON-PROBE: NOT DETECTED
CMV DNA CSF QL NAA+NON-PROBE: NOT DETECTED
E COLI K1 DNA CSF QL NAA+NON-PROBE: NOT DETECTED
ERYTHROCYTE [DISTWIDTH] IN BLOOD BY AUTOMATED COUNT: 15.9 % (ref 11.6–15.1)
EV RNA CSF QL NAA+NON-PROBE: NOT DETECTED
GLUCOSE CSF-MCNC: 87 MG/DL (ref 50–80)
GP B STREP DNA CSF QL NAA+NON-PROBE: NOT DETECTED
GRAM STN SPEC: NORMAL
GRAM STN SPEC: NORMAL
HAEM INFLU DNA CSF QL NAA+NON-PROBE: NOT DETECTED
HCT VFR BLD AUTO: 36.5 % (ref 34.8–46.1)
HGB BLD-MCNC: 11.7 G/DL (ref 11.5–15.4)
HHV6 DNA CSF QL NAA+NON-PROBE: NOT DETECTED
HSV1 DNA CSF QL NAA+NON-PROBE: NOT DETECTED
HSV2 DNA CSF QL NAA+NON-PROBE: NOT DETECTED
L MONOCYTOG DNA CSF QL NAA+NON-PROBE: NOT DETECTED
MCH RBC QN AUTO: 30 PG (ref 26.8–34.3)
MCHC RBC AUTO-ENTMCNC: 32.1 G/DL (ref 31.4–37.4)
MCV RBC AUTO: 94 FL (ref 82–98)
N MEN DNA CSF QL NAA+NON-PROBE: NOT DETECTED
PARECHOVIRUS A RNA CSF QL NAA+NON-PROBE: NOT DETECTED
PLATELET # BLD AUTO: 77 THOUSANDS/UL (ref 149–390)
PMV BLD AUTO: 11.3 FL (ref 8.9–12.7)
PROT CSF-MCNC: 44 MG/DL (ref 15–45)
RBC # BLD AUTO: 3.9 MILLION/UL (ref 3.81–5.12)
RBC # CSF MANUAL: 18.7 UL (ref 0–10)
S PNEUM DNA CSF QL NAA+NON-PROBE: NOT DETECTED
TOTAL CELLS COUNTED BLD: NO
TUBE # CSF: 4
VZV DNA CSF QL NAA+NON-PROBE: NOT DETECTED
WBC # BLD AUTO: 11.47 THOUSAND/UL (ref 4.31–10.16)
WBC # CSF AUTO: 0 /UL (ref 0–5)

## 2018-07-27 PROCEDURE — 92526 ORAL FUNCTION THERAPY: CPT

## 2018-07-27 PROCEDURE — 94762 N-INVAS EAR/PLS OXIMTRY CONT: CPT

## 2018-07-27 PROCEDURE — 85027 COMPLETE CBC AUTOMATED: CPT | Performed by: PHYSICIAN ASSISTANT

## 2018-07-27 PROCEDURE — 009U3ZX DRAINAGE OF SPINAL CANAL, PERCUTANEOUS APPROACH, DIAGNOSTIC: ICD-10-PCS | Performed by: INTERNAL MEDICINE

## 2018-07-27 PROCEDURE — 99223 1ST HOSP IP/OBS HIGH 75: CPT | Performed by: INTERNAL MEDICINE

## 2018-07-27 PROCEDURE — 99233 SBSQ HOSP IP/OBS HIGH 50: CPT | Performed by: PSYCHIATRY & NEUROLOGY

## 2018-07-27 PROCEDURE — 99233 SBSQ HOSP IP/OBS HIGH 50: CPT | Performed by: INTERNAL MEDICINE

## 2018-07-27 PROCEDURE — 94760 N-INVAS EAR/PLS OXIMETRY 1: CPT

## 2018-07-27 RX ADMIN — PHENYTOIN SODIUM 50 MG: 50 INJECTION INTRAMUSCULAR; INTRAVENOUS at 05:56

## 2018-07-27 RX ADMIN — ACETAMINOPHEN 650 MG: 160 SUSPENSION ORAL at 00:46

## 2018-07-27 RX ADMIN — VALPROATE SODIUM 500 MG: 100 INJECTION, SOLUTION INTRAVENOUS at 13:51

## 2018-07-27 RX ADMIN — ACETAMINOPHEN 650 MG: 650 SUPPOSITORY RECTAL at 12:47

## 2018-07-27 RX ADMIN — ACETAMINOPHEN 650 MG: 160 SUSPENSION ORAL at 13:59

## 2018-07-27 RX ADMIN — CEFTRIAXONE SODIUM 1000 MG: 10 INJECTION, POWDER, FOR SOLUTION INTRAVENOUS at 00:36

## 2018-07-27 RX ADMIN — AMPICILLIN SODIUM 2000 MG: 2 INJECTION, POWDER, FOR SOLUTION INTRAMUSCULAR; INTRAVENOUS at 06:02

## 2018-07-27 RX ADMIN — Medication 8.8 MG: at 21:04

## 2018-07-27 RX ADMIN — BISACODYL 10 MG: 10 SUPPOSITORY RECTAL at 08:32

## 2018-07-27 RX ADMIN — AMPICILLIN SODIUM 2000 MG: 2 INJECTION, POWDER, FOR SOLUTION INTRAMUSCULAR; INTRAVENOUS at 00:57

## 2018-07-27 RX ADMIN — ACETAMINOPHEN 650 MG: 160 SUSPENSION ORAL at 06:09

## 2018-07-27 RX ADMIN — AMPICILLIN SODIUM 2000 MG: 2 INJECTION, POWDER, FOR SOLUTION INTRAMUSCULAR; INTRAVENOUS at 18:04

## 2018-07-27 RX ADMIN — SODIUM CHLORIDE 150 MG: 9 INJECTION, SOLUTION INTRAVENOUS at 11:22

## 2018-07-27 RX ADMIN — CEFTRIAXONE SODIUM 1000 MG: 10 INJECTION, POWDER, FOR SOLUTION INTRAVENOUS at 11:12

## 2018-07-27 RX ADMIN — VALPROATE SODIUM 500 MG: 100 INJECTION, SOLUTION INTRAVENOUS at 21:51

## 2018-07-27 RX ADMIN — VALPROATE SODIUM 500 MG: 100 INJECTION, SOLUTION INTRAVENOUS at 05:56

## 2018-07-27 RX ADMIN — ALTEPLASE 2 MG: 2.2 INJECTION, POWDER, LYOPHILIZED, FOR SOLUTION INTRAVENOUS at 13:44

## 2018-07-27 RX ADMIN — ACETAMINOPHEN 650 MG: 160 SUSPENSION ORAL at 21:51

## 2018-07-27 RX ADMIN — VANCOMYCIN HYDROCHLORIDE 1000 MG: 1 INJECTION, SOLUTION INTRAVENOUS at 23:05

## 2018-07-27 RX ADMIN — AMPICILLIN SODIUM 2000 MG: 2 INJECTION, POWDER, FOR SOLUTION INTRAMUSCULAR; INTRAVENOUS at 12:26

## 2018-07-27 RX ADMIN — VANCOMYCIN HYDROCHLORIDE 1000 MG: 1 INJECTION, SOLUTION INTRAVENOUS at 02:19

## 2018-07-27 RX ADMIN — SODIUM CHLORIDE 150 MG: 9 INJECTION, SOLUTION INTRAVENOUS at 20:52

## 2018-07-27 NOTE — PHYSICAL THERAPY NOTE
Physical Therapy Cancellation Note  Pt is not medically appropriate today  Will follow as appropriate      Evi Kyle PTA

## 2018-07-27 NOTE — PROCEDURES
Lumbar puncture  Date/Time: 7/26/2018 11:12 PM  Performed by: Josep Barton by: Quiana Finch     Patient location:  Bedside  Procedure performed by consultant: Mahi Melara MD     Consent:     Consent obtained:  Emergent situation  Universal protocol:     Relevant documents present and verified: yes      Test results available and properly labeled: yes      Immediately prior to procedure a time out was called: yes      Site/side marked: yes      Patient identity confirmed:  Arm band  Pre-procedure details:     Preparation: Patient was prepped and draped in usual sterile fashion    Indications:     Indications: evaluation for altered mental status and diagnostic intervention    Anesthesia (see MAR for exact dosages): Anesthesia method:  Local infiltration    Local anesthetic:  Lidocaine 1% w/o epi  Procedure details:     Lumbar space:  L3-L4 interspace    Patient position:  Sitting    Needle gauge:  20G x 3 5in    Needle type:  Spinal needle - Quincke tip    Number of attempts:  1    Fluid appearance:  Clear    Tubes of fluid:  4    Total volume (ml):  8  Post-procedure:     Puncture site:  Adhesive bandage applied    Patient tolerance of procedure:   Tolerated well, no immediate complications

## 2018-07-27 NOTE — PROGRESS NOTES
[address] Internal Medicine Progress Note  Patient: Estephania Merino 80 y o  female   MRN: [id number]  PCP: Tonya Hall MD  Unit/Bed#: [facility] 732-01 Encounter: [id number]  Date Of Visit: 07/27/18    Assessment:    Principal Problem:    Status epilepticus ([address] )  Active Problems:    Hypertension    Weakness    Ambulatory dysfunction    Thyroid nodule    Hyperchloremia    Encephalopathy with underlying dementia    Hypernatremia    Hypokalemia      Plan:    · Encephalopathy multifactorial clinically she is deteriorating, aggressive management of underlying metabolic derangements, discussed with Neurology physician assistant  · Sepsis vs SIRS - patient has been empirically placed on antibiotics, follow up on cultures, CSF analysis noted, will request Infectious Disease inputs  · Rash fever possibly due to medication effect, continue to closely monitor  · Hematochezia noted overnight monitor, monitor hemoglobin  · Dysphagia on Keofeed tubes  · E coli UTI completed antibiotics  · Asthenia generalized weakness multifactorial  · Hypertension monitor blood pressures  · Goals of care overall guarded prognosis, discussed with Neurology      VTE Pharmacologic Prophylaxis:   Pharmacologic: venodynes   Mechanical VTE Prophylaxis in Place: Yes    Patient Centered Rounds: I have performed bedside rounds with nursing staff today  Discussions with Specialists or Other Care Team Provider:  Neurology physician assistant, infectious Disease    Education and Discussions with Family / Patient:  Called and updated son over phone, overall guarded prognosis discussed, he verbalized understanding, he is agreeable to observing her over the next 24-36 hours for any clinical signs of improvement if not he is agreeable to considering palliative care/hospice    Time Spent for Care: 30 minutes  More than 50% of total time spent on counseling and coordination of care as described above      Current Length of Stay: 15 day(s)    Current Patient Status: Inpatient   Certification Statement: The patient will continue to require additional inpatient hospital stay due to As outlined    Discharge Plan / Estimated Discharge Date:  Encephalopathy requiring close monitoring as outlined    Code Status: Level 3 - DNAR and DNI      Subjective:   Patient continued to have spikes of fever  Patient also noted to hematochezia  Lumbar puncture results reviewed      Objective:     Vitals:   Temp (24hrs), Av 3 °F (38 5 °C), Min:99 3 °F (37 4 °C), Max:104 7 °F (40 4 °C)    HR:  [] 96  Resp:  [18-22] 18  BP: ()/(46-60) 96/60  SpO2:  [92 %-96 %] 96 %  Body mass index is 28 51 kg/m²  Input and Output Summary (last 24 hours):        Intake/Output Summary (Last 24 hours) at 18 1625  Last data filed at 18 1451   Gross per 24 hour   Intake             3524 ml   Output              464 ml   Net             3060 ml       Physical Exam:     Physical Exam    Comfortably lying in bed  Mucous membranes dry  Neck supple  Lungs diminished breath sounds bilaterally  Heart sounds S1-S2 noted  Abdomen soft  Encephalopathy noted, patient not responding to verbal stimuli, minimally responsive to painful stimuli  Edema noted  Generalized maculopapular rash  Pulses noted    Additional Data:     Labs:      Results from last 7 days  Lab Units 18  0602 18   WBC Thousand/uL 11 47* 11 15*   HEMOGLOBIN g/dL 11 7 12 0   HEMATOCRIT % 36 5 36 8   PLATELETS Thousands/uL 77* 81*   NEUTROS PCT %  --  82*   LYMPHS PCT %  --  4*   MONOS PCT %  --  8   EOS PCT %  --  4       Results from last 7 days  Lab Units 18   SODIUM mmol/L 139   POTASSIUM mmol/L 4 4   CHLORIDE mmol/L 107   CO2 mmol/L 28   BUN mg/dL 21   CREATININE mg/dL 0 82   CALCIUM mg/dL 7 9*   TOTAL PROTEIN g/dL 5 0*   BILIRUBIN TOTAL mg/dL 0 26   ALK PHOS U/L 70   ALT U/L 37   AST U/L 34   GLUCOSE RANDOM mg/dL 157*       Results from last 7 days  Lab Units 18   INR  1 13       * I Have Reviewed All Lab Data Listed Above  * Additional Pertinent Lab Tests Reviewed: All Labs Within Last 24 Hours Reviewed    Imaging:    Imaging Reports Reviewed Today Include:   Imaging Personally Reviewed by Myself Includes:  Chest x-ray personally viewed no active lung disease    Recent Cultures (last 7 days):       Results from last 7 days  Lab Units 07/27/18  0020 07/26/18  0108   BLOOD CULTURE   --  No Growth at 24 hrs  No Growth at 24 hrs  GRAM STAIN RESULT  1+ Polys  No bacteria seen  --        Last 24 Hours Medication List:     Current Facility-Administered Medications:  acetaminophen 650 mg Per NG Tube Q6H PRN [facility], PA-C    acetaminophen 650 mg Oral Once [facility], PA-C    acetaminophen 650 mg Rectal Q4H PRN Eather Curling, PA-C    ampicillin 2,000 mg Intravenous Q6H Eather Curling, PA-C Last Rate: Stopped (07/27/18 1256)   bisacodyl 10 mg Rectal Daily Carmelita Aguayo,     cefTRIAXone 1,000 mg Intravenous Q12H Eather Curling, PA-C Last Rate: Stopped (07/27/18 1137)   dextrose 5 % and sodium chloride 0 33 % 40 mL/hr Intravenous Continuous Elridge Sida, DO Last Rate: 40 mL/hr (07/26/18 1724)   lacosamide (VIMPAT) IVPB 150 mg Intravenous Q12H Noreen Galan MD Last Rate: Stopped (07/27/18 1152)   senna 8 8 mg Per NG Tube HS Rachaellucinda Montes PA-C    valproate sodium 500 mg Intravenous Q8H [facility] Larissa Lopes PA-C Last Rate: Stopped (07/27/18 1451)   vancomycin 15 mg/kg Intravenous Q24H Eather Curling, PA-C Last Rate: Stopped (07/27/18 0301)        Today, Patient Was Seen By: Renard Rivera MD    ** Please Note: This note has been constructed using a voice recognition system   **

## 2018-07-27 NOTE — PROGRESS NOTES
While making rounds, patient was grunting  Upon assessment, patient would grasp left hand upon command and wiggle left toes upon command as well  Patient was able to very faintly respond with "no, I'm okay" when asked if she has any pain however, that was the only verbal response she was able to produce at the time

## 2018-07-27 NOTE — PROGRESS NOTES
Progress Note - Neurology   Rah Brain 80 y o  female MRN: [id number]  Unit/Bed#: [facility] 732-01 Encounter: [id number]        Assessment/Plan :  1  Toxic Metabolic encephalopathy: Multifactorial in the setting of fever/leukocytosis, medication effect with AED with underlying hypoalbuminemia, sleep-wake cycle disturbance/ stress from recent seizure/postictal effect superimposed on limited cognitive reserve with likely cognitive impairment  Patient continues to be obtunded on my exam  Currently, febrile to 102 9, rash, opens eyes to voice, spontaneously moves all extremities, withdrawals to stimuli, moans with movement of extremities, toes up goine  -Repeat ammonia level 49  -S/p Geriatrics consult: Seroquel was discontinued    -Discontinued dilantin  -Encourage good sleep/wake cycle  -Frequent neurological checks/serial exams  -Stat CT head with acute decline of in exam/mental status  -Notify neurology with any changes in examination  2  Fever and leukocytosis: Tmax of 104 7 last night of unexplained etiology, now with generalized rash  WBC at 11 47 up from 6 77  Patient moaning with touch and movement of neck and extremities  +4 pitting edema  -CT cervical and thorasic spine ordered and pending to assess for abcess  -UA negative  -CXR negative  -Blood cultures pending    3   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  - LLE mild myoclonus on exam  - vEEG completed  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  - CT head negative for acute abnormality  - Repeat stat CTH negative for acute abnormality  - MRI brain with and without contrast was severe degraded by patient motion  No significant acute intracranial abnormality noted  Sequelae of prior subarachnoid hemorrhage noted with extensive hemosiderin staining    It may be reasonable for the patient to have a repeat MRI a brain down the road, especially due to her history of SAHs, when she is able to tolerate it better  I will leave this up to outpatient epileptologist   -continue telemetry monitoring  -Seizure precautions;   -continue Serial exams  - AED regime:   - IV Lacosamide (Vimpat) 150mg q 12 hours   - IV Depacon 500mg q 8 hours:     - 7/25 total level 62    - 7/21 Valproic acid total level 57    - 7/21 Valproic acid free level 24  -Discontinue Dilantin due to patient with rash continues to be obtunded      4  Dysphagia: As patient's somnolence and encephalopathy improve, would recommend repeat swallow exam   -Currently Keophed in place  -Speech following    5  Malnutrition: Albumin 2 4  -currently on Jevity    6  Ecoli UTI: Antibiotic treatment completed   7  Generalized weakness and ambulatory dysfunction:  -OT/PT: Recommending STR      Subjective:   Adriana Lopez is a 80 y  o  left handed female with history of amaurosis fugax in 11/2016, ICH in 6/2016, traumatic SAH in 2/2018 after falling when shoveling snow, history of ? spontaneous SAHs hypertension, hyperlipidemia,  who present to the hospital on 07/12/2018 secondary to complaints of generalized weakness with inability to get out of her chair which started the day prior  Karla Higginbotham was found to have UTI and is currently being treated for this   Patient developed left upper extremity shaking which resolved, followed by a left lower extremity shaking 2 days later    AED regimen has been adjusted  No further clinical seizures noted  vEEG was discontinued  Patient febrile with tmax 104 7  Patient pan cultured, and LP completed with comprehensive PCR negative, CSF total protien 44, CSF WBC 4, RBC 18 7, glucose 87  Patient continues to be obtunded, opens eyes to pain, moaning with movement of all extremities on exam, temp 102 3 and with generalized rash    Patient febrile overnight with tmax 104 7  Blood cultures, urine cultures sent, CXR completed  Patient currently obtunded, continues to be febrile 102 9, with rash   Concern for medication reaction  Discontinued Dilantin  On exam, Opens eyes to voice, spontaneously moves all extremities, withdrawals to noxious stimuli, moans throughout exam with movement and palpation of extremities and neck   Review of Systems - unable to assess due to patient obtunded  Vitals: Blood pressure 115/60, pulse 94, temperature (!) 102 3 °F (39 1 °C), temperature source Rectal, resp  rate 20, height 5' 2" (1 575 m), weight 70 7 kg (155 lb 13 8 oz), SpO2 96 %, not currently breastfeeding  ,Body mass index is 28 51 kg/m²  MEDS:    Current Facility-Administered Medications:  acetaminophen 650 mg Per NG Tube Q6H PRN TROMSØ, PA-IAM    acetaminophen 650 mg Oral Once Lexie AleGRACY powell    acetaminophen 650 mg Rectal Q4H PRN Blima Drop, PA-IAM    alteplase 2 mg Intracatheter Once [employer], CRNP    ampicillin 2,000 mg Intravenous Q6H Blima Drop, PA-C Last Rate: 2,000 mg (07/27/18 1226)   bisacodyl 10 mg Rectal Daily Savanna Mins, DO    cefTRIAXone 1,000 mg Intravenous Q12H Blima Drop, PA-C Last Rate: 1,000 mg (07/27/18 1112)   dextrose 5 % and sodium chloride 0 33 % 40 mL/hr Intravenous Continuous Oneyda Duffel, DO Last Rate: 40 mL/hr (07/26/18 1724)   lacosamide (VIMPAT) IVPB 150 mg Intravenous Q12H John Sosa MD Last Rate: 150 mg (07/27/18 1122)   phenytoin 50 mg Intravenous Q8H Jaspreet Henderson PA-C    senna 8 8 mg Per NG Tube  Rachael Montes PA-C    valproate sodium 500 mg Intravenous Q8H [facility] Larissa Lopes PA-C Last Rate: 500 mg (07/27/18 0556)   vancomycin 15 mg/kg Intravenous Q24H Blima Drop, PA-C Last Rate: Stopped (07/27/18 0301)     Physical Exam   Constitutional:   Critically ill appearing, pale, moaning on exam   HENT:   Head: Normocephalic and atraumatic  Eyes: Conjunctivae are normal  Pupils are equal, round, and reactive to light  Right eye exhibits no discharge  Left eye exhibits no discharge     Cardiovascular: Normal rate, regular rhythm, normal heart sounds and intact distal pulses  Exam reveals no gallop and no friction rub  No murmur heard  Pulmonary/Chest: Breath sounds normal  No respiratory distress  She has no wheezes  She has no rales  She exhibits no tenderness  On continuous pulse ox with SpO2 93%  Abdominal: Soft  Bowel sounds are normal  She exhibits no distension  There is no tenderness  Musculoskeletal: She exhibits edema (generalized +4 pitting edema)  Neurological:   Reflex Scores:       Bicep reflexes are 2+ on the right side and 2+ on the left side  Brachioradialis reflexes are 2+ on the right side and 2+ on the left side  Patellar reflexes are 0 on the right side and 0 on the left side  Skin: Rash (diffuse generalized rash on trunk and all extremities) noted  Feels very warm to palpation  Temp 102 3   Vitals reviewed  Neurologic Exam     Mental Status   Level of consciousness: responsive to painful stimuli  GCS 9 (E3,V2, M4)     Cranial Nerves     CN III, IV, VI   Pupils are equal, round, and reactive to light  Right pupil: Size: 3 mm  Shape: regular  Reactivity: brisk  Left pupil: Size: 3 mm  Shape: regular  Reactivity: brisk  Motor Exam   Overall muscle tone: increasedSpontaneously moves all extremities  Withdrawals to noxious stimuli     Sensory Exam   Light touch normal      Gait, Coordination, and Reflexes     Reflexes   Right brachioradialis: 2+  Left brachioradialis: 2+  Right biceps: 2+  Left biceps: 2+  Right patellar: 0  Left patellar: 0  Right plantar: upgoing  Left plantar: upgoing      Lab Results:   I have personally reviewed pertinent reports      CBC:   Results from last 7 days  Lab Units 07/27/18  0602 07/26/18  2036 07/26/18  0107   WBC Thousand/uL 11 47* 11 15* 10 43*   RBC Million/uL 3 90 3 95 3 83   HEMOGLOBIN g/dL 11 7 12 0 11 5   HEMATOCRIT % 36 5 36 8 35 7   MCV fL 94 93 93   PLATELETS Thousands/uL 77* 81* 97*   BMP/CMP:   Results from last 7 days  Lab Units 07/26/18 2036 07/26/18  0517 07/25/18  0438   SODIUM mmol/L 139 139 138   POTASSIUM mmol/L 4 4 4 0 4 0   CHLORIDE mmol/L 107 107 105   CO2 mmol/L 28 27 27   ANION GAP mmol/L 4 5 6   BUN mg/dL 21 20 15   CREATININE mg/dL 0 82 0 81 0 72   GLUCOSE RANDOM mg/dL 157* 150* 315*   CALCIUM mg/dL 7 9* 8 3 7 9*   AST U/L 34  --   --    ALT U/L 37  --   --    ALK PHOS U/L 70  --   --    TOTAL PROTEIN g/dL 5 0*  --   --    BILIRUBIN TOTAL mg/dL 0 26  --   --    EGFR ml/min/1 73sq m 64 65 75   HgBA1C:   Results from last 7 days  Lab Units 07/25/18 0438   HEMOGLOBIN A1C % 6 0   Coagulation:   Results from last 7 days  Lab Units 07/26/18 2036   INR  1 13   Ammonia:   Results from last 7 days  Lab Units 07/26/18  1625   AMMONIA umol/L 49*   Urinalysis:   Results from last 7 days  Lab Units 07/26/18  0128   COLOR UA  Yellow   CLARITY UA  Clear   SPEC GRAV UA  1 015   PH UA  6 5   LEUKOCYTES UA  Negative   NITRITE UA  Negative   PROTEIN UA mg/dl Trace*   GLUCOSE UA mg/dl Negative   KETONES UA mg/dl Negative   BILIRUBIN UA  Negative   BLOOD UA  Trace*   Medication Drug Levels:   Results from last 7 days  Lab Units 07/25/18  0438 07/21/18  2100 07/21/18  0553 07/21/18  0546   PHENYTOIN LVL ug/mL 12 9 11 9 12 6  --    VALPROIC ACID TOTAL ug/mL 62 57  --  60       Imaging Studies: I have personally reviewed pertinent reports  and I have personally reviewed pertinent films in PACS     EEG, Pathology, and Other Studies: I have personally reviewed pertinent reports  and I have personally reviewed pertinent films in PACS    VTE Prophylaxis: Sequential compression device (Venodyne)      Counseling / Coordination of Care  Total time spent today 40 minutes  Greater than 50% of total time was spent with the patient and / or family counseling and / or coordination of care   A description of the counseling / coordination of care: Coordination of care with Internal Medicine and discussion of plan of care and exam findings with Attending Neurologist

## 2018-07-27 NOTE — PROGRESS NOTES
Patient has been unresponsive to verbal stimuli and minimally responsive to painful stimuli  Patient has been turned and continues not answer to any questions being asked

## 2018-07-27 NOTE — PROGRESS NOTES
This is a 27-year-old female who presented with encephalopathy  Her encephalopathy is undifferentiated at this time, likely secondary to possible UTI verses AED medication, possible postictal   Is on currently lacosamide, depacon, phenytoin and valproic acid  Found to have right-sided focal motor seizures on video EEG  Patient today had a rectal temp of 104 7°  She was increasingly encephalopathic  Critical care was called, was told to evaluate the patient given that she was increasing obtunded  On exam, patient was moaning, had neck rigidity  She also felt warm to the touch  Mildly tachycardic  Patient is currently DNR DNI; there are possible ongoing discussions with palliative/hospice which will take place tomorrow  Her prognosis remains guarded/poor  At this time, patient will not require the ICU given her code status and prognosis  However family would still like full medical management, and they would like an LP performed  LP was performed using a sterile procedure, CSF studies were sent (see separate procedure ntoe)  Will follow up, given concern for meningitis  Patient placed on isolation precautions  Patient made step-down level 2

## 2018-07-27 NOTE — PLAN OF CARE
NEUROSENSORY - ADULT     Achieves stable or improved neurological status Not Progressing          DISCHARGE PLANNING     Discharge to home or other facility with appropriate resources Progressing        DISCHARGE PLANNING - CARE MANAGEMENT     Discharge to post-acute care or home with appropriate resources Progressing        INFECTION - ADULT     Absence or prevention of progression during hospitalization Progressing        Knowledge Deficit     Patient/family/caregiver demonstrates understanding of disease process, treatment plan, medications, and discharge instructions Progressing        METABOLIC, FLUID AND ELECTROLYTES - ADULT     Electrolytes maintained within normal limits Progressing     Fluid balance maintained Progressing        NEUROSENSORY - ADULT     Absence of seizures Progressing     Remains free of injury related to seizures activity Progressing     Achieves maximal functionality and self care Progressing        Nutrition/Hydration-ADULT     Nutrient/Hydration intake appropriate for improving, restoring or maintaining nutritional needs Progressing        PAIN - ADULT     Verbalizes/displays adequate comfort level or baseline comfort level Progressing        Potential for Falls     Patient will remain free of falls Progressing        Prexisting or High Potential for Compromised Skin Integrity     Skin integrity is maintained or improved Progressing        SAFETY ADULT     Maintain or return to baseline ADL function Progressing     Maintain or return mobility status to optimal level Progressing     Patient will remain free of falls Progressing

## 2018-07-27 NOTE — PROGRESS NOTES
Was notified patient was feeling warm and had rectal temp 104 7 as well as one bloody BM solid stool  Chart reviewed and patient seen and examined  Patient admitted for weakness and found to have UTI  Was noted to have LLE jerking and video EEG confirmed partial seizures  Patient started on AEDs  Per chart review seems like patient has had decline in mental status over the past 2 days  Of note, patient spiked temp of 102 9 last night  White count was 10  There was concern she may have aspirated as she vomited but CXR was negative  UA unremarkable  No antibiotics were started  Blood cultures are still pending  Patient is currently obtunded  Exam:  Gen: Obtunded, occasional moaning  Neck: Stiff  Resists passive flexion  Grimaces in pain and moans when I attempt to flex her neck  Heart: Tachycardic  Lungs: Decreased breath sounds  Abd: Mild suprapubic tenderness to palpation  Abd soft  No guarding  Neuro: Pupils reactive  Occasionally grunts when I say her name  Does not open eyes or follow commands  Localizes to pain  Extremities flaccid  Skin: warm, flushed    A/P:  -Patient meeting sepsis criteria  Fever, tachycardia   -Ordered stat labs: CBC, procal, lactic, CMP, PT/INR, ABG  -Check repeat chest x-ray  -F/U blood cultures  -High fever with declining mental status and no source of infection is concerning for CNS infection  -Discussed with ID- recommended discussing with neurology who is following patient  [place] patient needs LP  Discussed with neurology who agrees with LP  However, neurologist not in house to perform LP   -Discussed with critical care  They accept patient onto their service  -Updated son, Cuca Cuevas  He confirms she is DNR/DNI but otherwise he does want full medical care for his mother including upgrade to ICU, lumbar puncture, etc to look for possible reversible source of encephalopathy   He is aware of guarded prognosis  -D/C'd heparin due to hematochezia  -Start vanco, Rocephin, ampicillin after LP obtained    CC time 1 hour    Addendum: After further review of chart, critical care feels Level 1 SD would be most appropriate for patient given DNR/DNI status  However, they will perform LP and be on consult

## 2018-07-27 NOTE — SPEECH THERAPY NOTE
Speech Language/Pathology    Speech/Language Pathology Progress Note    Patient Name: David Lara  FCMZB'P Date: 7/27/2018     Problem List  Patient Active Problem List   Diagnosis    Nephrolithiasis    Hypertension    Hyperbilirubinemia    Hyperlipidemia    Amaurosis fugax    Hearing loss    Subarachnoid hemorrhage (HCC)    Tinea pedis    Vitamin D deficiency    Subdural hematoma (HCC)    Lumbar disc disease    Fall    Bilateral shoulder pain    Peripheral edema    Weakness    Ambulatory dysfunction    Thyroid nodule    Status epilepticus (HCC)    Hyperchloremia    Encephalopathy with underlying dementia    Hypernatremia    Hypokalemia        Past Medical History  Past Medical History:   Diagnosis Date    Amaurosis fugax 11/18/2016    Arthritis     History of subarachnoid hemorrhage     Hyperlipidemia     Hypertension     Nephrolithiasis     Thyroid nodule 7/17/2018        Past Surgical History  Past Surgical History:   Procedure Laterality Date    LITHOTRIPSY      IL FRAGMENT KIDNEY STONE/ ESWL Left 1/3/2017    Procedure: LITHROTRIPSY EXTRACORPORAL SHOCKWAVE (ESWL); Surgeon: Miranda Nuñez MD;  Location: [facility];  Service: Urology    IL FRAGMENT KIDNEY STONE/ ESWL Right 11/18/2016    Procedure: Brad Turner SHOCKWAVE (ESWL); Surgeon: Miranda Nuñez MD;  Location: [facility];  Service: Urology    URETERAL STENT PLACEMENT Bilateral 11/9/2016    Procedure: Yenni Vaughn; STENT INSERTION ;  Surgeon: Miranda Nuñez MD;  Location[facility];  Service:          Subjective:  Lethargic, not verbalizing  Appears edematous and now w/ rash  Temp of 104 last night  Objective:  Seen for po potential  Appears to have further declined  Again w/ open mouth posture  Needs frequent oral care and suction  Hypergag  She did not attempt to bite today when moisturizer was applied to the roof of her mouth  Not appropriate for po trials     Assessment:  Not appropriate for po trials  Plan/Recommendations:  ? Overall plan, prognosis  NPO, alternate nutrition

## 2018-07-28 ENCOUNTER — APPOINTMENT (INPATIENT)
Dept: RADIOLOGY | Facility: HOSPITAL | Age: 83
DRG: 682 | End: 2018-07-28
Payer: MEDICARE

## 2018-07-28 PROCEDURE — 72126 CT NECK SPINE W/DYE: CPT

## 2018-07-28 PROCEDURE — 99233 SBSQ HOSP IP/OBS HIGH 50: CPT | Performed by: PSYCHIATRY & NEUROLOGY

## 2018-07-28 PROCEDURE — 72129 CT CHEST SPINE W/DYE: CPT

## 2018-07-28 PROCEDURE — 94760 N-INVAS EAR/PLS OXIMETRY 1: CPT | Performed by: SOCIAL WORKER

## 2018-07-28 PROCEDURE — 99233 SBSQ HOSP IP/OBS HIGH 50: CPT | Performed by: INTERNAL MEDICINE

## 2018-07-28 PROCEDURE — 99232 SBSQ HOSP IP/OBS MODERATE 35: CPT | Performed by: INTERNAL MEDICINE

## 2018-07-28 PROCEDURE — 94762 N-INVAS EAR/PLS OXIMTRY CONT: CPT

## 2018-07-28 RX ORDER — LACTULOSE 20 G/30ML
30 SOLUTION ORAL 2 TIMES DAILY
Status: DISCONTINUED | OUTPATIENT
Start: 2018-07-28 | End: 2018-07-29 | Stop reason: HOSPADM

## 2018-07-28 RX ADMIN — SODIUM CHLORIDE 250 ML: 0.9 INJECTION, SOLUTION INTRAVENOUS at 21:10

## 2018-07-28 RX ADMIN — DEXTROSE AND SODIUM CHLORIDE 40 ML/HR: 5; .33 INJECTION, SOLUTION INTRAVENOUS at 00:40

## 2018-07-28 RX ADMIN — IOHEXOL 100 ML: 350 INJECTION, SOLUTION INTRAVENOUS at 09:41

## 2018-07-28 RX ADMIN — ACETAMINOPHEN 650 MG: 160 SUSPENSION ORAL at 05:07

## 2018-07-28 RX ADMIN — CEFTRIAXONE SODIUM 1000 MG: 10 INJECTION, POWDER, FOR SOLUTION INTRAVENOUS at 10:25

## 2018-07-28 RX ADMIN — CEFTRIAXONE SODIUM 1000 MG: 10 INJECTION, POWDER, FOR SOLUTION INTRAVENOUS at 00:39

## 2018-07-28 RX ADMIN — AMPICILLIN SODIUM 2000 MG: 2 INJECTION, POWDER, FOR SOLUTION INTRAMUSCULAR; INTRAVENOUS at 06:29

## 2018-07-28 RX ADMIN — VALPROATE SODIUM 500 MG: 100 INJECTION, SOLUTION INTRAVENOUS at 05:07

## 2018-07-28 RX ADMIN — AMPICILLIN SODIUM 2000 MG: 2 INJECTION, POWDER, FOR SOLUTION INTRAMUSCULAR; INTRAVENOUS at 01:56

## 2018-07-28 RX ADMIN — LACTULOSE 30 G: 20 SOLUTION ORAL at 17:34

## 2018-07-28 RX ADMIN — SODIUM CHLORIDE 150 MG: 9 INJECTION, SOLUTION INTRAVENOUS at 10:31

## 2018-07-28 RX ADMIN — SODIUM CHLORIDE 150 MG: 9 INJECTION, SOLUTION INTRAVENOUS at 21:10

## 2018-07-28 RX ADMIN — VALPROATE SODIUM 500 MG: 100 INJECTION, SOLUTION INTRAVENOUS at 22:16

## 2018-07-28 RX ADMIN — ACETAMINOPHEN 650 MG: 160 SUSPENSION ORAL at 17:34

## 2018-07-28 RX ADMIN — AMPICILLIN SODIUM 2000 MG: 2 INJECTION, POWDER, FOR SOLUTION INTRAMUSCULAR; INTRAVENOUS at 18:30

## 2018-07-28 RX ADMIN — BISACODYL 10 MG: 10 SUPPOSITORY RECTAL at 10:33

## 2018-07-28 RX ADMIN — VALPROATE SODIUM 500 MG: 100 INJECTION, SOLUTION INTRAVENOUS at 14:18

## 2018-07-28 RX ADMIN — ACETAMINOPHEN 650 MG: 160 SUSPENSION ORAL at 10:33

## 2018-07-28 RX ADMIN — AMPICILLIN SODIUM 2000 MG: 2 INJECTION, POWDER, FOR SOLUTION INTRAMUSCULAR; INTRAVENOUS at 12:19

## 2018-07-28 NOTE — PROGRESS NOTES
Progress Note - Neurology   Mukesh Cotton 80 y o  female MRN: [id number]  Unit/Bed#: [facility] 732-01 Encounter: [id number]    Assessment/ Plan:  1  Toxic Metabolic encephalopathy: Multifactorial in the setting of fever/leukocytosis, medication effect with AED with underlying hypoalbuminemia, sleep-wake cycle disturbance/ stress from recent seizure/postictal effect superimposed on limited cognitive reserve with likely cognitive impairment  -Repeat ammonia level 49, initated lactulose    -Dilantin discontinued   -Continue current AED regiment of           - IV Lacosamide (Vimpat) 150mg q 12 hours                - IV Depacon 500mg q 8 hours:    -B12 pending    -If no improvement in exam in 24-48 hours, will consider re-initiating VEEG    -Seizure precautions    -Will continue to follow closely, please monitor exam and notify with changes     -Plan discussed with attending physician, who is in agreement with above          Subjective:   No acute events overnight  Pt currently afebrile  Continues to be somnolent  Somewhat arousable by noxious stimuli, yells "No!" and "I don't know" when asked to state her name  Demonstrates R fast beat nystagmus when eyes forced open  Will monitor closely over next 24-48 hours, if no improvement in mental status, will re-initiate VEEG       ROS:  See subjective       Medications:  Scheduled Meds:  Current Facility-Administered Medications:  acetaminophen 650 mg Per NG Tube Q6H PRN Madeag Dunaway PA-C    acetaminophen 650 mg Oral Once Eva Huerta PA-C    acetaminophen 650 mg Rectal Q4H PRN [place] PA-C    ampicillin 2,000 mg Intravenous Q6H Abby Canada PA-C Last Rate: Stopped (07/28/18 1249)   bisacodyl 10 mg Rectal Daily Melody Earl DO    cefTRIAXone 1,000 mg Intravenous Q12H Abby Canada PA-C Last Rate: Stopped (07/28/18 1055)   lacosamide (VIMPAT) IVPB 150 mg Intravenous Q12H Keesha Finch MD Last Rate: Stopped (07/28/18 1101) lactulose 30 g Oral BID Maria D Tubbs PA-C    senna 8 8 mg Per NG Tube HS Rachael GRACY Montes    valproate sodium 500 mg Intravenous Q8H [address] Larissa E HeldGRACY Last Rate: Stopped (07/28/18 1518)   vancomycin 15 mg/kg Intravenous Q24H Ester CounterGRACY Last Rate: 1,000 mg (07/27/18 2305)     Continuous Infusions:   PRN Meds:   acetaminophen    acetaminophen      Vitals: Blood pressure (!) 102/49, pulse 87, temperature 99 9 °F (37 7 °C), temperature source Rectal, resp  rate 20, height 5' 2" (1 575 m), weight 71 4 kg (157 lb 6 5 oz), SpO2 96 %, not currently breastfeeding  ,Body mass index is 28 79 kg/m²  Physical Exam:   Physical Exam   Constitutional: She is oriented to person, place, and time  She appears well-nourished  She has a sickly appearance  She appears ill  HENT:   Head: Normocephalic and atraumatic  Right Ear: External ear normal    Left Ear: External ear normal    Nose: Nose normal    Mouth/Throat: Oropharynx is clear and moist  No oropharyngeal exudate  Eyes: Conjunctivae are normal  Right eye exhibits no discharge  Left eye exhibits no discharge  No scleral icterus  Neck: Neck supple  Pulmonary/Chest: Effort normal  No respiratory distress  Musculoskeletal: She exhibits no edema or deformity  Neurological: She is oriented to person, place, and time  Skin:   Rash noted    Psychiatric:   Unable to assess 2/2 encephalopathy   Nursing note and vitals reviewed  Neurologic Exam     Mental Status   Oriented to person, place, and time  Attention: decreased  Concentration: decreased  Level of consciousness: responsive to painful stimuli  Abnormal comprehension       Cranial Nerves   PERRL  No gaze deviation or disconjugate gaze noted  R fast beat nystagmus when opening eyes   (+) corneals b/l  Face grossly symmetric   (+) cough and gag  Tongue midline without fasciculations or atrophy     Motor Exam   Muscle bulk: normal  Overall muscle tone: normal  Moves all extremities equally antigravity      Sensory Exam     Grimace to noxious stimuli x 4     Gait, Coordination, and Reflexes     Gait  Gait: (deferred 2/2 critically ill)    Reflexes   Right plantar: upgoing  Left plantar: upgoing  Right ankle clonus: absent  Left ankle clonus: absent      Lab, Imaging and other studies: I have personally reviewed pertinent reports  No results found for this or any previous visit (from the past 24 hour(s)) ]      VTE Prophylaxis: Sequential compression device (Venodyne)     Counseling / Coordination of Care  Total time spent today 35 minutes  Greater than 50% of total time was spent with the patient and / or family counseling and / or coordination of care  A description of the counseling / coordination of care: The pt was seen and examined by myself and the attending physician  The chart was reviewed thoroughly   The pt's family was counseled inthe room

## 2018-07-28 NOTE — PROGRESS NOTES
[address] Internal Medicine Progress Note  Patient: April Thomas 80 y o  female   MRN: [id number]  PCP: Zayra Rowland MD  Unit/Bed#: [facility] 732-01 Encounter: [id number]  Date Of Visit: 07/28/18    Assessment:    Principal Problem:    Status epilepticus ([address] )  Active Problems:    Hypertension    Weakness    Ambulatory dysfunction    Thyroid nodule    Hyperchloremia    Encephalopathy with underlying dementia    Hypernatremia    Hypokalemia      Plan:    · Encephalopathy multifactorial discussed with Neurology, continue close monitoring  · Sepsis versus SIRS patient has been empirically placed on antibiotics, infectious Disease input noted follow-up on cultures  · Rash and fever possibly medication induced, rash improving monitor  · Hematochezia monitor hemoglobin  · Dysphagia on Keofeed tubes  · E coli UTI completed antibiotics  · Hypertension monitor blood pressures  · Asthenia generalized weakness multifactorial  · Goals of care overall guarded prognosis, discussed with Neurology, discussed with son Cuca Cuevas over phone       VTE Pharmacologic Prophylaxis:   Pharmacologic: Hematochezia  Mechanical VTE Prophylaxis in Place: Yes    Patient Centered Rounds: I have performed bedside rounds with nursing staff today  Discussions with Specialists or Other Care Team Provider:  Neurology    Education and Discussions with Family / Patient:  Discussed with the son over phone in detail    Time Spent for Care: 30 minutes  More than 50% of total time spent on counseling and coordination of care as described above      Current Length of Stay: 16 day(s)    Current Patient Status: Inpatient   Certification Statement: The patient will continue to require additional inpatient hospital stay due to As mentioned    Discharge Plan / Estimated Discharge Date:  Discussed with Neurology close monitoring over the next 36-48 hours, if patient continues to deteriorate or is unchanged consider palliative care/hospice son is agreeable to this plan    Code Status: Level 3 - DNAR and DNI      Subjective:     Patient is comfortably lying in bed    Objective:     Vitals:   Temp (24hrs), Av 5 °F (37 5 °C), Min:98 6 °F (37 °C), Max:100 5 °F (38 1 °C)    HR:  [] 87  Resp:  [16-22] 20  BP: ()/(49-63) 102/49  SpO2:  [94 %-97 %] 96 %  Body mass index is 28 79 kg/m²  Input and Output Summary (last 24 hours): Intake/Output Summary (Last 24 hours) at 18 1545  Last data filed at 18 1249   Gross per 24 hour   Intake          2726 33 ml   Output              692 ml   Net          203 33 ml       Physical Exam:     Physical Exam    Comfortably lying in bed  Mucous membranes are dry  Neck supple  Lungs diminished breath sounds bilaterally  Heart sounds S1 and S2 noted  Abdomen soft  Patient is encephalopathy could not responding to painful stimuli  Pupils sluggishly reacting  Anasarca noted    Additional Data:     Labs:      Results from last 7 days  Lab Units 18  0602 18   WBC Thousand/uL 11 47* 11 15*   HEMOGLOBIN g/dL 11 7 12 0   HEMATOCRIT % 36 5 36 8   PLATELETS Thousands/uL 77* 81*   NEUTROS PCT %  --  82*   LYMPHS PCT %  --  4*   MONOS PCT %  --  8   EOS PCT %  --  4       Results from last 7 days  Lab Units 18   SODIUM mmol/L 139   POTASSIUM mmol/L 4 4   CHLORIDE mmol/L 107   CO2 mmol/L 28   BUN mg/dL 21   CREATININE mg/dL 0 82   CALCIUM mg/dL 7 9*   TOTAL PROTEIN g/dL 5 0*   BILIRUBIN TOTAL mg/dL 0 26   ALK PHOS U/L 70   ALT U/L 37   AST U/L 34   GLUCOSE RANDOM mg/dL 157*       Results from last 7 days  Lab Units 18   INR  1 13       * I Have Reviewed All Lab Data Listed Above  * Additional Pertinent Lab Tests Reviewed:  All Labs Within Last 24 Hours Reviewed    Imaging:    Imaging Reports Reviewed Today Include:   Imaging Personally Reviewed by Myself Includes:  CT cervical and thoracic spine noted    Recent Cultures (last 7 days):       Results from last 7 days  Lab Units 07/27/18  0020 07/26/18  0108   BLOOD CULTURE   --  No Growth at 48 hrs  No Growth at 48 hrs  GRAM STAIN RESULT  1+ Polys  No bacteria seen  --        Last 24 Hours Medication List:     Current Facility-Administered Medications:  acetaminophen 650 mg Per NG Tube Q6H PRN Em Lieberman PA-C    acetaminophen 650 mg Oral Once Tawana GRACY Morales    acetaminophen 650 mg Rectal Q4H PRN Grace Pearl PA-C    ampicillin 2,000 mg Intravenous Q6H Grcae Pearl PA-C Last Rate: Stopped (07/28/18 1249)   bisacodyl 10 mg Rectal Daily Ajay Ortega DO    cefTRIAXone 1,000 mg Intravenous Q12H Grace Pearl PA-C Last Rate: Stopped (07/28/18 1055)   lacosamide (VIMPAT) IVPB 150 mg Intravenous Q12H Hildegard Ganser, MD Last Rate: Stopped (07/28/18 1101)   senna 8 8 mg Per NG Tube HS Rachael Montes PA-C    valproate sodium 500 mg Intravenous Q8H [address] Larissa E GRACY Lopes Last Rate: 500 mg (07/28/18 1418)   vancomycin 15 mg/kg Intravenous Q24H Grace Pearl PA-C Last Rate: 1,000 mg (07/27/18 2305)        Today, Patient Was Seen By: Kristine Parisi MD    ** Please Note: This note has been constructed using a voice recognition system   **

## 2018-07-28 NOTE — PLAN OF CARE
DISCHARGE PLANNING     Discharge to home or other facility with appropriate resources Progressing        DISCHARGE PLANNING - CARE MANAGEMENT     Discharge to post-acute care or home with appropriate resources Progressing        INFECTION - ADULT     Absence or prevention of progression during hospitalization Progressing        Knowledge Deficit     Patient/family/caregiver demonstrates understanding of disease process, treatment plan, medications, and discharge instructions Progressing        METABOLIC, FLUID AND ELECTROLYTES - ADULT     Electrolytes maintained within normal limits Progressing     Fluid balance maintained Progressing        NEUROSENSORY - ADULT     Achieves stable or improved neurological status Progressing     Absence of seizures Progressing     Remains free of injury related to seizures activity Progressing     Achieves maximal functionality and self care Progressing        Nutrition/Hydration-ADULT     Nutrient/Hydration intake appropriate for improving, restoring or maintaining nutritional needs Progressing        PAIN - ADULT     Verbalizes/displays adequate comfort level or baseline comfort level Progressing        Potential for Falls     Patient will remain free of falls Progressing        Prexisting or High Potential for Compromised Skin Integrity     Skin integrity is maintained or improved Progressing        SAFETY ADULT     Maintain or return to baseline ADL function Progressing     Maintain or return mobility status to optimal level Progressing     Patient will remain free of falls Progressing        SAFETY,RESTRAINT: NV/NON-SELF DESTRUCTIVE BEHAVIOR     Remains free of harm/injury (restraint for non violent/non self-detsructive behavior) Progressing     Returns to optimal restraint-free functioning Progressing

## 2018-07-28 NOTE — CONSULTS
Consultation - Infectious Disease   Chika Hamilton 80 y o  female MRN: [id number]  Unit/Bed#: [facility] 732-01 Encounter: [id number]      Inpatient consult to Infectious Diseases  Consult performed by: Maximiliano Barrera ordered by: Sussy Rivers          IMPRESSION & RECOMMENDATIONS:   Impression:  1   FUO R/0 drug fever i e  Dilantin or valproic acid versus occult sepsis  2  Epilepsia partialis continua  3  S/P SAH  4  S/PE coli UTI    Recommendations:    Discuss with the primary service  1   Check final results of cultures  2  With negative blood culture and CSF culture at 24hours as well as modestly elevated WBC and presence of a rash suspect that this is likely a drug fever  3   Would continue vancomycin and ceftriaxone X 24 more hours  If cultures are negative at the end of that time and there is no other evidence of sepsis would consider discontinuing antibiotics      HISTORY OF PRESENT ILLNESS:    Reason for Consult:  Suspected sepsis  HPI:  The patient is obtunded  The history was therefore obtained from the chart  Chika Hamilton is a 80y o  year old female who was admitted on 7/12/18 with generalized weakness  She also had some suprapubic and left lower quadrant discomfort  Urine culture revealed greater than 100,000 E coli that was susceptible to all tested and was treated with a 5 day course of antibiotics for UTI  She also had a history of amaurosis fugax  She was evaluated for an uncontrollable persistent rhythmic LLE tremor  Neurology felt that this was compatible with a focal status epilepticus and she received anti seizure medication  In the evening of 7/25 the patient began spiking temperatures which reached T-max of a 104 7° last p m  Sherre Soulier Patient's white count was only modestly elevated  In addition the patient developed a rash  An LP was done yesterday with essentially negative results so far  Blood cultures x2 are negative at 24 hours    She was placed today on ceftriaxone 1 g q 12 hours IV and vancomycin 1 g Q 24 hours IV  The patient had been on Dilantin which was discontinued today  The patient's lactate is within normal limits  The procalcitonin was elevated at 0 59      Review of Systems   Unable to perform ROS: Mental status change         PAST MEDICAL HISTORY:  Past Medical History:   Diagnosis Date    Amaurosis fugax 2016    Arthritis     History of subarachnoid hemorrhage     Hyperlipidemia     Hypertension     Nephrolithiasis     Thyroid nodule 2018     Past Surgical History:   Procedure Laterality Date    LITHOTRIPSY      DE FRAGMENT KIDNEY STONE/ ESWL Left 1/3/2017    Procedure: LITHROTRIPSY EXTRACORPORAL SHOCKWAVE (ESWL); Surgeon: Ronel Olmedo MD;  Location: [facility];  Service: Urology    DE FRAGMENT KIDNEY STONE/ ESWL Right 2016    Procedure: [place] SHOCKWAVE (ESWL); Surgeon: Ronel Olmedo MD;  [facility]: [facility];  Service: Urology    URETERAL STENT PLACEMENT Bilateral 2016    Procedure: Stef Rowland; Tyrone Covarrubias ;  Surgeon: Ronel Olmedo MD;  [facility];  Service:        FAMILY HISTORY:  Non-contributory    SOCIAL HISTORY:  Social History     History   Alcohol Use No     History   Drug Use No     History   Smoking Status    Never Smoker   Smokeless Tobacco    Never Used       ALLERGIES:  No Known Allergies    MEDICATIONS:  All current active medications have been reviewed        PHYSICAL EXAM:  HR:  [] 108  Resp:  [16-22] 16  BP: ()/(46-60) 116/53  SpO2:  [92 %-97 %] 97 %  Temp (24hrs), Av 5 °F (38 1 °C), Min:98 7 °F (37 1 °C), Max:102 5 °F (39 2 °C)  Current: Temperature: 98 7 °F (37 1 °C)    Intake/Output Summary (Last 24 hours) at 18 2108  Last data filed at 18 1841   Gross per 24 hour   Intake             3524 ml   Output              711 ml   Net             2813 ml       General Appearance:  Appearing elderly obtunded, but in no distress, appears stated age   Head:  Normocephalic, without obvious abnormality, atraumatic   Eyes:  PERRL, bilateral arcus senilis conjunctiva pink and sclera anicteric, both eyes   Nose: NG tube in place, mucosa normal, no drainage   Throat: Oropharynx moist without lesions; lips, mucosa, and tongue normal; teeth and gums normal   Neck: Supple, symmetrical, trachea midline, no adenopathy, no tenderness/mass/nodules   Back:   Symmetric, no curvature, ROM normal, no CVA tenderness   Lungs:   Clear to auscultation bilaterally, no audible wheezes, rhonchi and rales, respirations unlabored   Chest Wall:  No tenderness or deformity   Heart:  Regular rate and rhythm, S1, S2 normal, no murmur, rub or gallop   Abdomen:   Soft, non-tender, non-distended, positive bowel sounds, no masses, no organomegaly    No CVA tenderness   Extremities: 2/4 lower extremity edema   Skin: Skin color shows a diffuse sunburn type erythema rash, texture, turgor normal, no draining wounds noted  Neurologic: Obtunded, minimal response to verbal stimuli           Invasive Devices:   PICC Line 09/80/06 Right Basilic (Active)   Reasons to continue PICC No peripheral vascular access 7/27/2018  8:00 PM   Goal for Removal D/C when no longer on IV medications 7/27/2018  8:00 PM   Line Necessity Reviewed Yes, reviewed with provider 7/27/2018  8:00 PM   Site Assessment Clean;Dry; Intact 7/27/2018  8:00 PM   Proximal Lumen (Red Port-PICC only) Status Flushed;Capped; Infusing 7/27/2018  8:00 PM   Medial Lumen (Purple/White Port-PICC only) Status Other (Comment) 7/27/2018  8:00 PM   Distal Lumen ([place] Port-PICC only) Status Cap changed;Normal saline locked; Flushed 7/27/2018  8:00 PM   Catheter Out on Skin (cm) 0 cm 7/20/2018 11:48 AM   Extremity Circumference (cm) 30 cm 7/20/2018 11:48 AM   Dressing Type Transparent; Chlorhexidine dressing;Securing device 7/27/2018  8:00 PM   Dressing Status Clean;Dry; Intact 7/27/2018  8:00 PM   Dressing Intervention Dressing changed 7/27/2018  7:00 AM   Dressing Change Due 08/03/18 7/27/2018  9:00 AM       NG/OG/Enteral Tube Enteral Feeding Tube Right nares (Active)   Placement Reverification X-ray 7/24/2018 12:32 PM   Site Assessment Clean;Dry; Intact 7/27/2018  2:00 PM   Enteral feeding tube secured at (cm) 73 Cm 7/26/2018  9:00 AM   Enteral feeding tube interventions Flushed 7/27/2018  2:00 PM   Status Tube feed infusing 7/27/2018  2:00 PM   Drainage Appearance None 7/27/2018  2:00 PM       LABS, IMAGING, & OTHER STUDIES:  Lab Results:      I have personally reviewed pertinent labs  Results from last 7 days  Lab Units 07/27/18  0602 07/26/18 2036 07/26/18  0107   WBC Thousand/uL 11 47* 11 15* 10 43*   HEMOGLOBIN g/dL 11 7 12 0 11 5   PLATELETS Thousands/uL 77* 81* 97*       Results from last 7 days  Lab Units 07/26/18 2036 07/26/18  0517 07/25/18  0438   SODIUM mmol/L 139 139 138   POTASSIUM mmol/L 4 4 4 0 4 0   CHLORIDE mmol/L 107 107 105   CO2 mmol/L 28 27 27   ANION GAP mmol/L 4 5 6   BUN mg/dL 21 20 15   CREATININE mg/dL 0 82 0 81 0 72   EGFR ml/min/1 73sq m 64 65 75   GLUCOSE RANDOM mg/dL 157* 150* 315*   CALCIUM mg/dL 7 9* 8 3 7 9*   AST U/L 34  --   --    ALT U/L 37  --   --    ALK PHOS U/L 70  --   --    TOTAL PROTEIN g/dL 5 0*  --   --    BILIRUBIN TOTAL mg/dL 0 26  --   --        Results from last 7 days  Lab Units 07/27/18  0020 07/26/18  0108   BLOOD CULTURE   --  No Growth at 24 hrs  No Growth at 24 hrs  GRAM STAIN RESULT  1+ Polys  No bacteria seen  --        Imaging Studies:   I have personally reviewed pertinent imaging study reports and images in PACS  EKG, Pathology, and Other Studies:   I have personally reviewed pertinent reports

## 2018-07-29 VITALS
WEIGHT: 157.41 LBS | BODY MASS INDEX: 28.97 KG/M2 | HEIGHT: 62 IN | RESPIRATION RATE: 20 BRPM | DIASTOLIC BLOOD PRESSURE: 53 MMHG | OXYGEN SATURATION: 96 % | SYSTOLIC BLOOD PRESSURE: 103 MMHG | TEMPERATURE: 99.5 F | HEART RATE: 89 BPM

## 2018-07-29 LAB
ANION GAP SERPL CALCULATED.3IONS-SCNC: 5 MMOL/L (ref 4–13)
ANISOCYTOSIS BLD QL SMEAR: PRESENT
BASOPHILS # BLD MANUAL: 0 THOUSAND/UL (ref 0–0.1)
BASOPHILS NFR MAR MANUAL: 0 % (ref 0–1)
BUN SERPL-MCNC: 29 MG/DL (ref 5–25)
BURR CELLS BLD QL SMEAR: PRESENT
CALCIUM SERPL-MCNC: 7.9 MG/DL (ref 8.3–10.1)
CHLORIDE SERPL-SCNC: 107 MMOL/L (ref 100–108)
CO2 SERPL-SCNC: 28 MMOL/L (ref 21–32)
CREAT SERPL-MCNC: 0.91 MG/DL (ref 0.6–1.3)
EOSINOPHIL # BLD MANUAL: 1.19 THOUSAND/UL (ref 0–0.4)
EOSINOPHIL NFR BLD MANUAL: 13 % (ref 0–6)
ERYTHROCYTE [DISTWIDTH] IN BLOOD BY AUTOMATED COUNT: 16.2 % (ref 11.6–15.1)
GFR SERPL CREATININE-BSD FRML MDRD: 57 ML/MIN/1.73SQ M
GLUCOSE SERPL-MCNC: 126 MG/DL (ref 65–140)
HCT VFR BLD AUTO: 30.2 % (ref 34.8–46.1)
HGB BLD-MCNC: 9.5 G/DL (ref 11.5–15.4)
LYMPHOCYTES # BLD AUTO: 0.37 THOUSAND/UL (ref 0.6–4.47)
LYMPHOCYTES # BLD AUTO: 4 % (ref 14–44)
MCH RBC QN AUTO: 29.8 PG (ref 26.8–34.3)
MCHC RBC AUTO-ENTMCNC: 31.5 G/DL (ref 31.4–37.4)
MCV RBC AUTO: 95 FL (ref 82–98)
MONOCYTES # BLD AUTO: 0.92 THOUSAND/UL (ref 0–1.22)
MONOCYTES NFR BLD: 10 % (ref 4–12)
NEUTROPHILS # BLD MANUAL: 6.6 THOUSAND/UL (ref 1.85–7.62)
NEUTS BAND NFR BLD MANUAL: 14 % (ref 0–8)
NEUTS SEG NFR BLD AUTO: 58 % (ref 43–75)
NRBC BLD AUTO-RTO: 0 /100 WBCS
PLATELET # BLD AUTO: 59 THOUSANDS/UL (ref 149–390)
PLATELET BLD QL SMEAR: ABNORMAL
PMV BLD AUTO: 12.5 FL (ref 8.9–12.7)
POIKILOCYTOSIS BLD QL SMEAR: PRESENT
POTASSIUM SERPL-SCNC: 3.8 MMOL/L (ref 3.5–5.3)
RBC # BLD AUTO: 3.19 MILLION/UL (ref 3.81–5.12)
SODIUM SERPL-SCNC: 140 MMOL/L (ref 136–145)
TOTAL CELLS COUNTED SPEC: 100
VARIANT LYMPHS # BLD AUTO: 1 %
VIT B12 SERPL-MCNC: 525 PG/ML (ref 100–900)
WBC # BLD AUTO: 9.17 THOUSAND/UL (ref 4.31–10.16)

## 2018-07-29 PROCEDURE — 99239 HOSP IP/OBS DSCHRG MGMT >30: CPT | Performed by: INTERNAL MEDICINE

## 2018-07-29 PROCEDURE — 85007 BL SMEAR W/DIFF WBC COUNT: CPT | Performed by: INTERNAL MEDICINE

## 2018-07-29 PROCEDURE — 85027 COMPLETE CBC AUTOMATED: CPT | Performed by: INTERNAL MEDICINE

## 2018-07-29 PROCEDURE — 80048 BASIC METABOLIC PNL TOTAL CA: CPT | Performed by: INTERNAL MEDICINE

## 2018-07-29 PROCEDURE — 82607 VITAMIN B-12: CPT | Performed by: PHYSICIAN ASSISTANT

## 2018-07-29 PROCEDURE — 94760 N-INVAS EAR/PLS OXIMETRY 1: CPT | Performed by: SOCIAL WORKER

## 2018-07-29 PROCEDURE — 99233 SBSQ HOSP IP/OBS HIGH 50: CPT | Performed by: PSYCHIATRY & NEUROLOGY

## 2018-07-29 RX ADMIN — BISACODYL 10 MG: 10 SUPPOSITORY RECTAL at 08:15

## 2018-07-29 RX ADMIN — SODIUM CHLORIDE 150 MG: 9 INJECTION, SOLUTION INTRAVENOUS at 11:09

## 2018-07-29 RX ADMIN — ACETAMINOPHEN 650 MG: 160 SUSPENSION ORAL at 04:40

## 2018-07-29 RX ADMIN — ACETAMINOPHEN 650 MG: 160 SUSPENSION ORAL at 12:50

## 2018-07-29 RX ADMIN — LACTULOSE 30 G: 20 SOLUTION ORAL at 08:14

## 2018-07-29 RX ADMIN — VALPROATE SODIUM 500 MG: 100 INJECTION, SOLUTION INTRAVENOUS at 05:19

## 2018-07-29 NOTE — SOCIAL WORK
Informed by Dr Roxanna Grey that he met with pt's son and he is interested in [facility]  [facility] referral sent to [facility] and informed Laurence Recinos, [facility] liaison of same

## 2018-07-29 NOTE — DISCHARGE SUMMARY
Discharge Summary - [facility] Internal Medicine    Patient Information: Sergio Goodrich 80 y o  female MRN: [id number]  Unit/Bed#: [facility] 732-01 Encounter: [id number]    Discharging Physician / Practitioner: Oscar Aldana MD  PCP: Gisel Mcgovern MD  Admission Date: 7/12/2018  Discharge Date: 07/29/18    Disposition:     Other: Inpatient Hospice    Reason for Admission:  Generalized weakness, inability to walk    Discharge Diagnoses:     Principal Problem:    Status epilepticus ([facility] [address] )  Active Problems:    Hypertension    Weakness    Ambulatory dysfunction    Thyroid nodule    Hyperchloremia    Encephalopathy with underlying dementia    Hypernatremia    Hypokalemia  Resolved Problems:    Urinary tract infection without hematuria    Acute kidney injury ([facility] [address] )    Tremor    Localization-related (focal) (partial) idiopathic epilepsy and epileptic syndromes with seizures of localized onset, not intractable, with status epilepticus ([facility] )      Consultations During Hospital Stay:  · Neurology  · Geriatrics  · Infectious disease    Procedures Performed:     · Chest x-ray no active lung disease  · CT head no acute interval abnormality  · CT abdomen pelvis diverticulosis without evidence of diverticulitis  · MRI lumbar spine mild smooth levoscoliosis, slight progression of lumbar degenerative disc disease with slight worsening of canal stenosis and foraminal narrowing  · CT cervical spine no cervical spine fracture or traumatic malalignment  · CT thoracic spine mild multilevel spondylitic degenerative changes  · Lower extremity venous Doppler no evidence of DVT  · X-ray chest and abdomen for feeding tube placement confirmation  · MRI brain no acute intracranial abnormality      Hospital Course:     Sergio Goodrich is a 80 y o  female patient who originally presented to the hospital on 7/12/2018 due to generalized weakness and ambulatory dysfunction    Patient was evaluated by Neurology given her clinical features there was a concern for focal motor status, she was placed on vEEG, she was provided with IV Keppra and closely monitored  However patient continued to deteriorate, her encephalopathy worsened she was also noted to be hypernatremic dehydration poor p o  intake  A feeding tube was placed  During her hospital stay she developed sirs with fever this was evaluated by Infectious Disease  She also underwent a lumbar puncture the CSF analysis was not suggestive an active infection  Patient was placed on broad-spectrum antibiotics empirically however these were discontinued  Her symptoms of fever and rash for likely due to antiepileptic medication possibly Dilantin  Patient continued to do poorly, she is encephalopathy could not responding to verbal or painful stimuli  Discussed with Neurology and the son in detail at bedside and today they have opted for hospice and comfort measures only  Patient was evaluated by hospice nurse and is accepted to inpatient hospice house  Patient had lengthy complicated stay in the hospital kindly review the chart for details  Condition at Discharge: Hospice     Discharge Day Visit / Exam:     Subjective:  Comfortably lying in bed  Vitals: Blood Pressure: 103/53 (07/29/18 1120)  Pulse: 89 (07/29/18 1120)  Temperature: 99 5 °F (37 5 °C) (07/29/18 0706)  Temp Source: Rectal (07/29/18 0706)  Respirations: 20 (07/29/18 1120)  Height: 5' 2" (157 5 cm) (07/13/18 1844)  Weight - Scale: 71 4 kg (157 lb 6 5 oz) (07/29/18 0550)  SpO2: 96 % (07/29/18 1120)  Exam:   Physical Exam    Comfortably lying in bed  Neck supple  Mucous members are dry  Heart sounds S1-S2 noted  Lungs bilateral diminished breath sounds  Encephalopathy noted, minimal response to painful stimuli  Not responding to verbal stimuli  Anasarca noted  Rash improving    Discharge instructions/Information to patient and family:   See after visit summary for information provided to patient and family        Discharge plan discussed with Neurology  Discharge plan discussed with the son at bedside in detail  Discussed with hospice nurse    Provisions for Follow-Up Care:  See after visit summary for information related to follow-up care and any pertinent home health orders  Planned Readmission: no     Discharge Statement:  I spent 50 minutes discharging the patient  This time was spent on the day of discharge  I had direct contact with the patient on the day of discharge  Greater than 50% of the total time was spent examining patient, answering all patient questions, arranging and discussing plan of care with patient as well as directly providing post-discharge instructions  Additional time then spent on discharge activities  Discharge Medications:  See after visit summary for reconciled discharge medications provided to patient and family        ** Please Note: This note has been constructed using a voice recognition system **

## 2018-07-29 NOTE — SOCIAL WORK
Informed by Bo Clemente, [address] liaison, that she met with pt's son and pt meets inpt hospice criteria and has been approved for [facility] and there is a bed available  Cm arranged BLS transport for 2 pm with [facility]  Pt's son, [facility], and pt's bedside RN Albaro Mullen made aware of same  CMN and facility agency transfer form completed and chart copy requested

## 2018-07-29 NOTE — PROGRESS NOTES
Progress Note - Infectious Disease   Justice Walton 80 y o  female MRN: [id number]  Unit/Bed#: [facility] 732-01 Encounter: [id number]      Impression:  1   FUO R/0 drug fever i e  Dilantin or valproic acid versus occult sepsis  2  Epilepsia partialis continua  3  S/P SAH  4   S/P E coli UTI  5  Toxic metabolic encephalopathy    Recommendations:  Patient now minimally responsive verbally with low-grade temperature that seems to be declining  Blood and CSF cultures are negative  Rash is still present  1  Will discontinue ceftriaxone vancomycin and ampicillin  2  We will discontinue droplet precautions    Antibiotics:  None    Subjective: The patient is now minimally responsive to verbal stimuli but is unable to articulate clearly      Objective:  Vitals:  HR:  [] 88  Resp:  [16-22] 20  BP: ()/(46-63) 96/46  SpO2:  [94 %-97 %] 97 %  Temp (24hrs), Av 6 °F (37 6 °C), Min:98 6 °F (37 °C), Max:100 5 °F (38 1 °C)  Current: Temperature: 99 9 °F (37 7 °C)    Physical Exam:     General Appearance:  Elderly, lethargic minimally verbal appears uncomfortable   Throat: Oropharynx moist without lesions  Lips, mucosa, and tongue normal   Neck: Supple, symmetrical, trachea midline, no adenopathy,  no tenderness/mass/nodules   Lungs:   Clear to auscultation bilaterally, no audible wheezes, rhonchi or rales; respirations unlabored   Heart:  Regular rate and rhythm, S1, S2 normal, no murmur, rub or gallop   Abdomen:   Soft, non-tender, non-distended, positive bowel sounds  No masses, no organomegaly    No CVA tenderness   Extremities: 2/4 lower extremity edema   Skin: Sunburn type rash still present but less prominent than yesterday           Invasive Devices     Peripherally Inserted Central Catheter Line            PICC Line 98/88/20 Right Basilic 8 days          Drain            NG/OG/Enteral Tube Enteral Feeding Tube Right nares 4 days                Labs, Imaging, & Other studies:   All pertinent labs were personally reviewed    Results from last 7 days  Lab Units 07/27/18  0602 07/26/18 2036 07/26/18  0107   WBC Thousand/uL 11 47* 11 15* 10 43*   HEMOGLOBIN g/dL 11 7 12 0 11 5   PLATELETS Thousands/uL 77* 81* 97*       Results from last 7 days  Lab Units 07/26/18 2036 07/26/18  0517 07/25/18  0438   SODIUM mmol/L 139 139 138   POTASSIUM mmol/L 4 4 4 0 4 0   CHLORIDE mmol/L 107 107 105   CO2 mmol/L 28 27 27   ANION GAP mmol/L 4 5 6   BUN mg/dL 21 20 15   CREATININE mg/dL 0 82 0 81 0 72   EGFR ml/min/1 73sq m 64 65 75   GLUCOSE RANDOM mg/dL 157* 150* 315*   CALCIUM mg/dL 7 9* 8 3 7 9*   AST U/L 34  --   --    ALT U/L 37  --   --    ALK PHOS U/L 70  --   --    TOTAL PROTEIN g/dL 5 0*  --   --    BILIRUBIN TOTAL mg/dL 0 26  --   --        Results from last 7 days  Lab Units 07/27/18  0020 07/26/18  0108   BLOOD CULTURE   --  No Growth at 48 hrs  No Growth at 48 hrs     GRAM STAIN RESULT  1+ Polys  No bacteria seen  --

## 2018-07-29 NOTE — PROGRESS NOTES
Family at bedside requesting ot speak with physician  SLIM notified   Will continue to monitor patient

## 2018-07-29 NOTE — PROGRESS NOTES
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  -Secondary to limited improvement in exam over several days as well as pt's expressed wishes prior to becoming encephalopathic, pt will be made comfort care  Discussed extensively with pt's son and Internal Medicine attending    -Unlikely to represent recurrent seizure as 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 lower extremity shaking only without cognitive disturbance       -Continue current AED regiment, or as per hospice protocol           - IV Lacosamide (Vimpat) 150mg q 12 hours                - IV Depacon 500mg q 8 hours   -B12 WNL   -Plan discussed with attending physician, who is in agreement with above          Subjective:   No acute events overnight  Pt currently afebrile  Continues to be somnolent  Discussion held between internal medicine and Neurology with pt's son, decision was made to pursue comfort care  Pt's son counseled extensively  ROS:  See subjective       Medications:  Scheduled Meds:    Current Facility-Administered Medications:  acetaminophen 650 mg Per NG Tube Q6H PRN Flakito Luong PA-C    acetaminophen 650 mg Oral Once Lars Iqbal PA-C    acetaminophen 650 mg Rectal Q4H PRN Domingo Slaughter PA-C    bisacodyl 10 mg Rectal Daily Elizabeth Moreno DO    lacosamide (VIMPAT) IVPB 150 mg Intravenous Q12H Mario Pandey MD Last Rate: Stopped (07/28/18 2231)   lactulose 30 g Oral BID Maria D Tubbs PA-C    senna 8 8 mg Per NG Tube  Rachael Montes PA-C    valproate sodium 500 mg Intravenous Q8H [facility] Larissa Lopes PA-C Last Rate: 500 mg (07/29/18 0519)     Continuous Infusions:   PRN Meds:   acetaminophen    acetaminophen      Vitals: Blood pressure (!) 97/39, pulse 81, temperature 99 5 °F (37 5 °C), temperature source Rectal, resp  rate 22, height 5' 2" (1 575 m), weight 71 4 kg (157 lb 6 5 oz), SpO2 96 %, not currently breastfeeding  ,Body mass index is 28 79 kg/m²  Physical Exam:   Physical exam deferred 2/2 comfort measures     Physical Exam   Constitutional: She has a sickly appearance  She appears ill  Neurologic Exam     Sensory Exam            Lab, Imaging and other studies: I have personally reviewed pertinent reports       Recent Results (from the past 24 hour(s))   Basic metabolic panel    Collection Time: 07/29/18  4:41 AM   Result Value Ref Range    Sodium 140 136 - 145 mmol/L    Potassium 3 8 3 5 - 5 3 mmol/L    Chloride 107 100 - 108 mmol/L    CO2 28 21 - 32 mmol/L    Anion Gap 5 4 - 13 mmol/L    BUN 29 (H) 5 - 25 mg/dL    Creatinine 0 91 0 60 - 1 30 mg/dL    Glucose 126 65 - 140 mg/dL    Calcium 7 9 (L) 8 3 - 10 1 mg/dL    eGFR 57 ml/min/1 73sq m   CBC and differential    Collection Time: 07/29/18  4:41 AM   Result Value Ref Range    WBC 9 17 4 31 - 10 16 Thousand/uL    RBC 3 19 (L) 3 81 - 5 12 Million/uL    Hemoglobin 9 5 (L) 11 5 - 15 4 g/dL    Hematocrit 30 2 (L) 34 8 - 46 1 %    MCV 95 82 - 98 fL    MCH 29 8 26 8 - 34 3 pg    MCHC 31 5 31 4 - 37 4 g/dL    RDW 16 2 (H) 11 6 - 15 1 %    MPV 12 5 8 9 - 12 7 fL    Platelets 59 (L) 529 - 390 Thousands/uL    nRBC 0 /100 WBCs   Vitamin B12    Collection Time: 07/29/18  4:41 AM   Result Value Ref Range    Vitamin B-12 525 100 - 900 pg/mL   Manual Differential(PHLEBS Do Not Order)    Collection Time: 07/29/18  4:41 AM   Result Value Ref Range    Segmented % 58 43 - 75 %    Bands % 14 (H) 0 - 8 %    Lymphocytes % 4 (L) 14 - 44 %    Monocytes % 10 4 - 12 %    Eosinophils % 13 (H) 0 - 6 %    Basophils % 0 0 - 1 %    Atypical Lymphocytes % 1 (H) <=0 %    Absolute Neutrophils 6 60 1 85 - 7 62 Thousand/uL    Lymphocytes Absolute 0 37 (L) 0 60 - 4 47 Thousand/uL    Monocytes Absolute 0 92 0 00 - 1 22 Thousand/uL    Eosinophils Absolute 1 19 (H) 0 00 - 0 40 Thousand/uL    Basophils Absolute 0 00 0 00 - 0 10 Thousand/uL    Total Counted 100     Anisocytosis Present     Maury Cells Present     Poikilocytes Present     Platelet Estimate Decreased (A) Adequate   ]      VTE Prophylaxis: Sequential compression device (Venodyne)     Counseling / Coordination of Care  Total time spent today 35 minutes  Greater than 50% of total time was spent with the patient and / or family counseling and / or coordination of care  A description of the counseling / coordination of care: The pt was seen and examined by myself and the attending physician  The chart was reviewed thoroughly   The pt's family was counseled inthe room

## 2018-07-29 NOTE — NURSING NOTE
Attempted to call report  Was told the RN was on lunch and will call back   Gave extension number    Will continue to monitor patient

## 2018-07-29 NOTE — PROGRESS NOTES
Spoke with MICHAELA Hartman ) in regards to patients declining diastolic BP  Was given a 250ml bolus over 4 hours due to pressure of 96/46  Patients BP is now 109/37  Will obtain a manual bp and continue to monitor patient no further orders at this time

## 2018-07-30 ENCOUNTER — TRANSITIONAL CARE MANAGEMENT (OUTPATIENT)
Dept: FAMILY MEDICINE CLINIC | Facility: CLINIC | Age: 83
End: 2018-07-30

## 2018-07-30 LAB — BACTERIA CSF CULT: NO GROWTH

## 2018-07-31 LAB
BACTERIA BLD CULT: NORMAL
BACTERIA BLD CULT: NORMAL

## 2018-08-06 LAB
PCR AMPLIFICATION + DETECTION: NORMAL
WNV RNA SPEC QL NAA+PROBE: NEGATIVE

## 2024-12-23 NOTE — ASSESSMENT & PLAN NOTE
HTN  Pt bp stable and she will continue with current regimen of meds  PAST MEDICAL HISTORY:  No pertinent past medical history